# Patient Record
Sex: MALE | Race: WHITE
[De-identification: names, ages, dates, MRNs, and addresses within clinical notes are randomized per-mention and may not be internally consistent; named-entity substitution may affect disease eponyms.]

---

## 2018-04-04 ENCOUNTER — HOSPITAL ENCOUNTER (INPATIENT)
Dept: HOSPITAL 96 - M.ERS | Age: 83
LOS: 6 days | Discharge: NURSING, REHAB OR LONG-TERM CARE | DRG: 871 | End: 2018-04-10
Attending: INTERNAL MEDICINE | Admitting: INTERNAL MEDICINE
Payer: MEDICARE

## 2018-04-04 VITALS — DIASTOLIC BLOOD PRESSURE: 61 MMHG | SYSTOLIC BLOOD PRESSURE: 99 MMHG

## 2018-04-04 VITALS — SYSTOLIC BLOOD PRESSURE: 85 MMHG | DIASTOLIC BLOOD PRESSURE: 45 MMHG

## 2018-04-04 VITALS — BODY MASS INDEX: 24.1 KG/M2 | HEIGHT: 72.01 IN | WEIGHT: 177.91 LBS

## 2018-04-04 VITALS — SYSTOLIC BLOOD PRESSURE: 111 MMHG | DIASTOLIC BLOOD PRESSURE: 54 MMHG

## 2018-04-04 VITALS — SYSTOLIC BLOOD PRESSURE: 111 MMHG | DIASTOLIC BLOOD PRESSURE: 47 MMHG

## 2018-04-04 VITALS — SYSTOLIC BLOOD PRESSURE: 110 MMHG | DIASTOLIC BLOOD PRESSURE: 51 MMHG

## 2018-04-04 VITALS — SYSTOLIC BLOOD PRESSURE: 102 MMHG | DIASTOLIC BLOOD PRESSURE: 54 MMHG

## 2018-04-04 DIAGNOSIS — A41.9: Primary | ICD-10-CM

## 2018-04-04 DIAGNOSIS — I25.10: ICD-10-CM

## 2018-04-04 DIAGNOSIS — E11.621: ICD-10-CM
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
## 2018-04-04 DIAGNOSIS — Z88.0: ICD-10-CM

## 2018-04-04 DIAGNOSIS — Z79.82: ICD-10-CM

## 2018-04-04 DIAGNOSIS — Z95.5: ICD-10-CM

## 2018-04-04 DIAGNOSIS — Z95.0: ICD-10-CM

## 2018-04-04 DIAGNOSIS — Z79.899: ICD-10-CM
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
## 2018-04-04 DIAGNOSIS — I50.33: ICD-10-CM

## 2018-04-04 DIAGNOSIS — N18.9: ICD-10-CM

## 2018-04-04 DIAGNOSIS — E83.42: ICD-10-CM

## 2018-04-04 DIAGNOSIS — E44.0: ICD-10-CM

## 2018-04-04 DIAGNOSIS — N17.9: ICD-10-CM

## 2018-04-04 DIAGNOSIS — Z87.891: ICD-10-CM
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
## 2018-04-04 DIAGNOSIS — S36.62XA: ICD-10-CM

## 2018-04-04 DIAGNOSIS — L97.519: ICD-10-CM
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
## 2018-04-04 DIAGNOSIS — E11.22: ICD-10-CM

## 2018-04-04 DIAGNOSIS — Z86.73: ICD-10-CM

## 2018-04-04 DIAGNOSIS — J44.0: ICD-10-CM

## 2018-04-04 DIAGNOSIS — J18.9: ICD-10-CM

## 2018-04-04 DIAGNOSIS — I13.0: ICD-10-CM

## 2018-04-04 DIAGNOSIS — Z79.01: ICD-10-CM

## 2018-04-04 DIAGNOSIS — E11.51: ICD-10-CM

## 2018-04-04 DIAGNOSIS — I48.91: ICD-10-CM

## 2018-04-04 DIAGNOSIS — E78.5: ICD-10-CM

## 2018-04-04 DIAGNOSIS — L03.90: ICD-10-CM

## 2018-04-04 LAB
ABSOLUTE EOSINOPHILS: 0.1 THOU/UL (ref 0–0.7)
ABSOLUTE MONOCYTES: 0.6 THOU/UL (ref 0–1.2)
ALBUMIN SERPL-MCNC: 2.8 G/DL (ref 3.4–5)
ALP SERPL-CCNC: 56 U/L (ref 46–116)
ALT SERPL-CCNC: 23 U/L (ref 30–65)
ANION GAP SERPL CALC-SCNC: 8 MMOL/L (ref 7–16)
AST SERPL-CCNC: 33 U/L (ref 15–37)
BILIRUB SERPL-MCNC: 0.6 MG/DL
BILIRUB UR-MCNC: NEGATIVE MG/DL
BUN SERPL-MCNC: 30 MG/DL (ref 7–18)
CALCIUM SERPL-MCNC: 8.4 MG/DL (ref 8.5–10.1)
CHLORIDE SERPL-SCNC: 105 MMOL/L (ref 98–107)
CO2 SERPL-SCNC: 27 MMOL/L (ref 21–32)
COLOR UR: YELLOW
CREAT SERPL-MCNC: 1.5 MG/DL (ref 0.6–1.3)
EOSINOPHIL NFR BLD: 1 %
GLUCOSE SERPL-MCNC: 143 MG/DL (ref 70–99)
GRANULOCYTES NFR BLD MANUAL: 72 %
HCT VFR BLD CALC: 36.8 % (ref 42–52)
HGB BLD-MCNC: 12.1 GM/DL (ref 14–18)
INR PPP: 3.9
KETONES UR STRIP-MCNC: NEGATIVE MG/DL
LIPASE: 177 U/L (ref 73–393)
LYMPHOCYTES # BLD: 0.8 THOU/UL (ref 0.8–5.3)
LYMPHOCYTES NFR BLD AUTO: 6 %
MCH RBC QN AUTO: 30.1 PG (ref 26–34)
MCHC RBC AUTO-ENTMCNC: 32.9 G/DL (ref 28–37)
MCV RBC: 91.5 FL (ref 80–100)
MONOCYTES NFR BLD: 4 %
MPV: 7.4 FL. (ref 7.2–11.1)
NEUTROPHILS # BLD: 12.3 THOU/UL (ref 1.6–8.1)
NEUTS BAND NFR BLD: 17 %
NT-PRO BRAIN NAT PEPTIDE: 5600 PG/ML (ref ?–300)
NUCLEATED RBCS: 0 /100WBC
PLATELET # BLD EST: ADEQUATE 10*3/UL
PLATELET COUNT*: 164 THOU/UL (ref 150–400)
POTASSIUM SERPL-SCNC: 4.2 MMOL/L (ref 3.5–5.1)
PROT SERPL-MCNC: 6.5 G/DL (ref 6.4–8.2)
PROT UR QL STRIP: NEGATIVE
PROTHROMBIN TIME: 37.5 SECONDS (ref 9.2–11.5)
RBC # BLD AUTO: 4.03 MIL/UL (ref 4.5–6)
RBC # UR STRIP: (no result) /UL
RBC #/AREA URNS HPF: (no result) /HPF (ref 0–2)
RBC MORPH BLD: NORMAL
RDW-CV: 16.4 % (ref 10.5–14.5)
SODIUM SERPL-SCNC: 140 MMOL/L (ref 136–145)
SP GR UR STRIP: 1.02 (ref 1–1.03)
SQUAMOUS: (no result) /LPF (ref 0–3)
TROPONIN-I LEVEL: <0.06 NG/ML (ref ?–0.06)
URINE CLARITY: CLEAR
URINE GLUCOSE-RANDOM: NEGATIVE
URINE LEUKOCYTES-REFLEX: (no result)
URINE NITRITE-REFLEX: NEGATIVE
URINE WBC-REFLEX: (no result) /HPF (ref 0–5)
UROBILINOGEN UR STRIP-ACNC: 0.2 E.U./DL (ref 0.2–1)
WBC # BLD AUTO: 13.8 THOU/UL (ref 4–11)

## 2018-04-04 PROCEDURE — 02HV33Z INSERTION OF INFUSION DEVICE INTO SUPERIOR VENA CAVA, PERCUTANEOUS APPROACH: ICD-10-PCS | Performed by: INTERNAL MEDICINE

## 2018-04-04 NOTE — EKG
[place] [address]
Phone:  [phone]                     ELECTROCARDIOGRAM REPORT      
_______________________________________________________________________________
 
Name:       YAKOV RODRIGUEZ             Room:           [place] [address]    ADM [place]  
.R.#:  [id number]      Account #:      [id number]  
Admission:  18     Attend Phys:    Angelic Buchanan MD 
Discharge:               Date of Birth:  [birth year]  
         Report #: [id number]
    [id number]
_______________________________________________________________________________
THIS REPORT FOR:  //name//                      
 
                         [facility] ED
                                       
Test Date:    2018               Test Time:    01:45:39
Pat Name:     YAKOV RODRIGUEZ         Department:   
Patient ID:   [id number]            Room:         [facility]
Gender:       M                        Technician:   ELMO ROMEO
:          1933               Requested By: Jeri Miguel
Order Number: [id number] MD:   Kofi Chong
                                 Measurements
Intervals                              Axis          
Rate:         60                       P:            0
AL:           78                       QRS:          -79
QRSD:         204                      T:            99
QT:           468                                    
QTc:          468                                    
                           Interpretive Statements
Ventricular-paced rhythm
No further analysis attempted due to paced rhythm
Baseline wander in lead(s) V3
Compared to ECG 2016 12:54:45
rate increased
 
Electronically Signed On 2018 10:14:00 CDT by Kofi Chong
[email or web address]
 
 
 
 
 
 
 
 
 
 
 
 
 
 
 
 
 
  <ELECTRONICALLY SIGNED>
                                           By: Kofi Chong MD, [facility]      
  18     1014
D: 18 0145   _____________________________________
T: 18 0145   Kofi Chong MD, [facility]        /EPI

## 2018-04-05 VITALS — DIASTOLIC BLOOD PRESSURE: 60 MMHG | SYSTOLIC BLOOD PRESSURE: 140 MMHG

## 2018-04-05 VITALS — DIASTOLIC BLOOD PRESSURE: 46 MMHG | SYSTOLIC BLOOD PRESSURE: 105 MMHG

## 2018-04-05 VITALS — SYSTOLIC BLOOD PRESSURE: 134 MMHG | DIASTOLIC BLOOD PRESSURE: 67 MMHG

## 2018-04-05 VITALS — DIASTOLIC BLOOD PRESSURE: 51 MMHG | SYSTOLIC BLOOD PRESSURE: 120 MMHG

## 2018-04-05 VITALS — DIASTOLIC BLOOD PRESSURE: 50 MMHG | SYSTOLIC BLOOD PRESSURE: 111 MMHG

## 2018-04-05 VITALS — DIASTOLIC BLOOD PRESSURE: 50 MMHG | SYSTOLIC BLOOD PRESSURE: 122 MMHG

## 2018-04-05 LAB
ABSOLUTE BASOPHILS: 0 THOU/UL (ref 0–0.2)
ABSOLUTE EOSINOPHILS: 0 THOU/UL (ref 0–0.7)
ABSOLUTE MONOCYTES: 0.5 THOU/UL (ref 0–1.2)
ANION GAP SERPL CALC-SCNC: 8 MMOL/L (ref 7–16)
BASOPHILS NFR BLD AUTO: 0.3 %
BUN SERPL-MCNC: 28 MG/DL (ref 7–18)
CALCIUM SERPL-MCNC: 7.9 MG/DL (ref 8.5–10.1)
CHLORIDE SERPL-SCNC: 110 MMOL/L (ref 98–107)
CO2 SERPL-SCNC: 23 MMOL/L (ref 21–32)
CREAT SERPL-MCNC: 1.6 MG/DL (ref 0.6–1.3)
EOSINOPHIL NFR BLD: 0.2 %
GLUCOSE SERPL-MCNC: 83 MG/DL (ref 70–99)
GRANULOCYTES NFR BLD MANUAL: 85.6 %
HCT VFR BLD CALC: 32.6 % (ref 42–52)
HGB BLD-MCNC: 10.8 GM/DL (ref 14–18)
INR PPP: 5.5
INR PPP: 5.9
LYMPHOCYTES # BLD: 0.4 THOU/UL (ref 0.8–5.3)
LYMPHOCYTES NFR BLD AUTO: 6.2 %
MCH RBC QN AUTO: 30.8 PG (ref 26–34)
MCHC RBC AUTO-ENTMCNC: 33.3 G/DL (ref 28–37)
MCV RBC: 92.8 FL (ref 80–100)
MONOCYTES NFR BLD: 7.7 %
MPV: 7.9 FL. (ref 7.2–11.1)
NEUTROPHILS # BLD: 5.7 THOU/UL (ref 1.6–8.1)
NUCLEATED RBCS: 0 /100WBC
PLATELET COUNT*: 113 THOU/UL (ref 150–400)
POTASSIUM SERPL-SCNC: 4 MMOL/L (ref 3.5–5.1)
PREALB SERPL-MCNC: 11.3 MG/DL (ref 18–35.7)
PROTHROMBIN TIME: 52.2 SECONDS (ref 9.2–11.5)
PROTHROMBIN TIME: 56.1 SECONDS (ref 9.2–11.5)
RBC # BLD AUTO: 3.51 MIL/UL (ref 4.5–6)
RDW-CV: 16.8 % (ref 10.5–14.5)
SODIUM SERPL-SCNC: 141 MMOL/L (ref 136–145)
WBC # BLD AUTO: 6.6 THOU/UL (ref 4–11)

## 2018-04-05 NOTE — CON
[address]  [address]                    CONSULTATION                  
_______________________________________________________________________________
 
Name:       JENNIFERYAKOV F             Room:           [address] [place]  
M.R.#:  [id number]      Account #:      [id number]  
Admission:  04/04/18     Attend Phys:    Angelic Buchanan MD 
Discharge:               Date of Birth:  [birth year]  
         Report #: [id number]
                                                                     [id number]  
_______________________________________________________________________________
THIS REPORT FOR:  //name//                      
 
CC: Angelic Landin
 
DATE OF SERVICE:  04/04/2018
 
 
INFECTIOUS CONSULTATION
 
ATTENDING PHYSICIAN:  Dr. Buchanan.
 
REASON FOR EVALUATION:  Deep infection, right foot.
 
HISTORY OF PRESENT ILLNESS:  Chart reviewed, patient examined.  This is an
85-year-old gentleman with diabetes mellitus type 2 who I have seen in the past.
 Apparently, he was doing fairly well; however, developed an ulceration
involving the right foot plantar aspect over the course of the last 24 hours
prior to his admission, increasing inflammation noted.  He did have one episode
of emesis and progressive weakness.  He had some chills, although has not
evidently had fevers, history of diabetic foot ulcers.  Due to the rapidity of
its progression, he was admitted, placed on ceftriaxone, vancomycin and
levofloxacin.
 
ALLERGIES:  PENICILLINS, ACE INHIBITOR.
 
MEDICATIONS:  Include tamsulosin, vancomycin, metformin, aspirin, fish oil,
furosemide, atorvastatin, ascorbic acid, metoprolol, finasteride, fenofibrate,
pantoprazole.
 
PAST MEDICAL HISTORY:  Includes diabetes mellitus.  This is complicated by
vasculopathy, has previous stroke, known coronary artery disease, has a
pacemaker, diabetic foot ulcers in the past.
 
SOCIAL HISTORY:  Former smoker.  No ethanol.
 
FAMILY HISTORY:  Noncontributory.
 
REVIEW OF SYSTEMS:  As above.  Significant pulmonary-related complaints.
 
PHYSICAL EXAMINATION:
GENERAL:  He is experiencing mild-to-moderate distress, appears mildly
tachypneic.  He is somewhat chronically ill appearing, perhaps mildly
undernourished.
VITAL SIGNS:  Temperature 98.3, T-max was 99.6, pulse 71, respirations 20, blood
pressure 110/51.
 
 
 
[place] [address]                    CONSULTATION                  
_______________________________________________________________________________
 
Name:       YAKOV RODRIGUEZ             Room:           [address] [place]  
[facility]#:  [id number]      Account #:      [id number]  
Admission:  04/04/18     Attend Phys:    Angelic Buchanan MD 
Discharge:               Date of Birth:  [birth year]  
         Report #: [id number]
                                                                     [id number]  
_______________________________________________________________________________
SKIN:  Warm, dry, no rashes.
HEENT:  Otherwise, unremarkable.
LUNGS:  Diminished breath sounds.
HEART:  Regular.  I do not appreciate murmur.
ABDOMEN:  Soft, nontender, nondistended.
EXTREMITIES:  Dressing in place over the right foot.  I have access to
photographs, showed the plantar ulcer with several centimeters with marginal
surface, moderate degree of inflammation.  He ended up with open ulcer.
GENITOURINARY AND RECTAL:  Deferred.
 
LABORATORY DATA:  Plain film of the foot, no acute osseous abnormalities.  Left
basilar atelectasis, rate of 35.  CRP rate of 40.5.  CBC:  White count 13.8, H
and H 12.1 and 36.8, platelets of 164.  Urinalysis, 0-5 white cells, 10-30
bacteria.  Lactic acid 1.9.  Electrolytes:  Sodium 140, potassium 4.2, chloride
105, bicarbonate is 27, BUN and creatinine 30 and 1.5, glucose of 143.  LFTs
unremarkable.  Albumin of 28.  Total protein 6.5.  Estimated GFR 44.
 
ASSESSMENT:  Tracing skin and soft tissue infection involving the plantar aspect
of the right foot in the setting of diabetes mellitus.  I would expect an
superficial deep type progression outside .  There is no evidence of hard tissue
infection.  At this point, we will continue empiric therapy, he has had some
bedside partial debridement.  We will continue to monitor expectantly.  He may
well need additional intervention, mindful of risk of nosocomial related
infectious complications as well.  I encouraged incentive spirometer to optimize
his nutritional status, monitor expectantly.
 
 
 
 
 
 
 
 
 
 
 
 
 
 
 
 
 
 
 
<ELECTRONICALLY SIGNED>
                                        By:  Inder Garcia MD           
04/05/18     1057
D: 04/04/18 1517_______________________________________
T: 04/04/18 1909Jotriston Garcia MD              /nt

## 2018-04-06 VITALS — SYSTOLIC BLOOD PRESSURE: 130 MMHG | DIASTOLIC BLOOD PRESSURE: 61 MMHG

## 2018-04-06 VITALS — SYSTOLIC BLOOD PRESSURE: 133 MMHG | DIASTOLIC BLOOD PRESSURE: 67 MMHG

## 2018-04-06 VITALS — DIASTOLIC BLOOD PRESSURE: 67 MMHG | SYSTOLIC BLOOD PRESSURE: 133 MMHG

## 2018-04-06 VITALS — DIASTOLIC BLOOD PRESSURE: 49 MMHG | SYSTOLIC BLOOD PRESSURE: 106 MMHG

## 2018-04-06 VITALS — SYSTOLIC BLOOD PRESSURE: 115 MMHG | DIASTOLIC BLOOD PRESSURE: 52 MMHG

## 2018-04-06 VITALS — DIASTOLIC BLOOD PRESSURE: 60 MMHG | SYSTOLIC BLOOD PRESSURE: 122 MMHG

## 2018-04-06 VITALS — SYSTOLIC BLOOD PRESSURE: 147 MMHG | DIASTOLIC BLOOD PRESSURE: 74 MMHG

## 2018-04-06 LAB
ABSOLUTE BASOPHILS: 0 THOU/UL (ref 0–0.2)
ABSOLUTE EOSINOPHILS: 0.1 THOU/UL (ref 0–0.7)
ABSOLUTE MONOCYTES: 0.6 THOU/UL (ref 0–1.2)
ALBUMIN SERPL-MCNC: 2.3 G/DL (ref 3.4–5)
ALP SERPL-CCNC: 42 U/L (ref 46–116)
ALT SERPL-CCNC: 23 U/L (ref 30–65)
ANION GAP SERPL CALC-SCNC: 6 MMOL/L (ref 7–16)
AST SERPL-CCNC: 35 U/L (ref 15–37)
BASOPHILS NFR BLD AUTO: 0.5 %
BILIRUB SERPL-MCNC: 0.4 MG/DL
BUN SERPL-MCNC: 30 MG/DL (ref 7–18)
CALCIUM SERPL-MCNC: 8 MG/DL (ref 8.5–10.1)
CHLORIDE SERPL-SCNC: 111 MMOL/L (ref 98–107)
CO2 SERPL-SCNC: 24 MMOL/L (ref 21–32)
CREAT SERPL-MCNC: 1.5 MG/DL (ref 0.6–1.3)
EOSINOPHIL NFR BLD: 0.9 %
GLUCOSE SERPL-MCNC: 86 MG/DL (ref 70–99)
GRANULOCYTES NFR BLD MANUAL: 83.5 %
HCT VFR BLD CALC: 33.3 % (ref 42–52)
HGB BLD-MCNC: 11.1 GM/DL (ref 14–18)
INR PPP: 5.9
LYMPHOCYTES # BLD: 0.5 THOU/UL (ref 0.8–5.3)
LYMPHOCYTES NFR BLD AUTO: 6.5 %
MCH RBC QN AUTO: 31 PG (ref 26–34)
MCHC RBC AUTO-ENTMCNC: 33.2 G/DL (ref 28–37)
MCV RBC: 93.2 FL (ref 80–100)
MONOCYTES NFR BLD: 8.6 %
MPV: 8 FL. (ref 7.2–11.1)
NEUTROPHILS # BLD: 6.3 THOU/UL (ref 1.6–8.1)
NUCLEATED RBCS: 0 /100WBC
PLATELET COUNT*: 123 THOU/UL (ref 150–400)
POTASSIUM SERPL-SCNC: 3.9 MMOL/L (ref 3.5–5.1)
PROT SERPL-MCNC: 5.7 G/DL (ref 6.4–8.2)
PROTHROMBIN TIME: 55.5 SECONDS (ref 9.2–11.5)
RBC # BLD AUTO: 3.57 MIL/UL (ref 4.5–6)
RDW-CV: 16.6 % (ref 10.5–14.5)
SODIUM SERPL-SCNC: 141 MMOL/L (ref 136–145)
WBC # BLD AUTO: 7.5 THOU/UL (ref 4–11)

## 2018-04-07 VITALS — DIASTOLIC BLOOD PRESSURE: 64 MMHG | SYSTOLIC BLOOD PRESSURE: 123 MMHG

## 2018-04-07 VITALS — SYSTOLIC BLOOD PRESSURE: 104 MMHG | DIASTOLIC BLOOD PRESSURE: 62 MMHG

## 2018-04-07 VITALS — DIASTOLIC BLOOD PRESSURE: 58 MMHG | SYSTOLIC BLOOD PRESSURE: 118 MMHG

## 2018-04-07 VITALS — SYSTOLIC BLOOD PRESSURE: 138 MMHG | DIASTOLIC BLOOD PRESSURE: 51 MMHG

## 2018-04-07 VITALS — SYSTOLIC BLOOD PRESSURE: 134 MMHG | DIASTOLIC BLOOD PRESSURE: 54 MMHG

## 2018-04-07 VITALS — SYSTOLIC BLOOD PRESSURE: 135 MMHG | DIASTOLIC BLOOD PRESSURE: 54 MMHG

## 2018-04-07 LAB
ABSOLUTE BASOPHILS: 0 THOU/UL (ref 0–0.2)
ABSOLUTE EOSINOPHILS: 0.3 THOU/UL (ref 0–0.7)
ABSOLUTE MONOCYTES: 0.6 THOU/UL (ref 0–1.2)
ALBUMIN SERPL-MCNC: 2.4 G/DL (ref 3.4–5)
ALP SERPL-CCNC: 44 U/L (ref 46–116)
ALT SERPL-CCNC: 35 U/L (ref 30–65)
ANION GAP SERPL CALC-SCNC: 9 MMOL/L (ref 7–16)
AST SERPL-CCNC: 46 U/L (ref 15–37)
BASOPHILS NFR BLD AUTO: 0.6 %
BILIRUB SERPL-MCNC: 0.7 MG/DL
BUN SERPL-MCNC: 31 MG/DL (ref 7–18)
CALCIUM SERPL-MCNC: 8 MG/DL (ref 8.5–10.1)
CHLORIDE SERPL-SCNC: 109 MMOL/L (ref 98–107)
CO2 SERPL-SCNC: 25 MMOL/L (ref 21–32)
CREAT SERPL-MCNC: 1.5 MG/DL (ref 0.6–1.3)
EOSINOPHIL NFR BLD: 3.9 %
GLUCOSE SERPL-MCNC: 102 MG/DL (ref 70–99)
GRANULOCYTES NFR BLD MANUAL: 76.2 %
HCT VFR BLD CALC: 33 % (ref 42–52)
HGB BLD-MCNC: 10.9 GM/DL (ref 14–18)
INR PPP: 4.1
LYMPHOCYTES # BLD: 0.8 THOU/UL (ref 0.8–5.3)
LYMPHOCYTES NFR BLD AUTO: 10.7 %
MCH RBC QN AUTO: 30.3 PG (ref 26–34)
MCHC RBC AUTO-ENTMCNC: 33 G/DL (ref 28–37)
MCV RBC: 91.6 FL (ref 80–100)
MONOCYTES NFR BLD: 8.6 %
MPV: 8.3 FL. (ref 7.2–11.1)
NEUTROPHILS # BLD: 5.7 THOU/UL (ref 1.6–8.1)
NUCLEATED RBCS: 0 /100WBC
PLATELET COUNT*: 128 THOU/UL (ref 150–400)
POTASSIUM SERPL-SCNC: 3.6 MMOL/L (ref 3.5–5.1)
PROT SERPL-MCNC: 5.3 G/DL (ref 6.4–8.2)
PROTHROMBIN TIME: 38.7 SECONDS (ref 9.2–11.5)
RBC # BLD AUTO: 3.6 MIL/UL (ref 4.5–6)
RDW-CV: 16.7 % (ref 10.5–14.5)
SODIUM SERPL-SCNC: 143 MMOL/L (ref 136–145)
WBC # BLD AUTO: 7.4 THOU/UL (ref 4–11)

## 2018-04-08 VITALS — DIASTOLIC BLOOD PRESSURE: 61 MMHG | SYSTOLIC BLOOD PRESSURE: 128 MMHG

## 2018-04-08 VITALS — DIASTOLIC BLOOD PRESSURE: 52 MMHG | SYSTOLIC BLOOD PRESSURE: 127 MMHG

## 2018-04-08 VITALS — SYSTOLIC BLOOD PRESSURE: 134 MMHG | DIASTOLIC BLOOD PRESSURE: 71 MMHG

## 2018-04-08 VITALS — SYSTOLIC BLOOD PRESSURE: 135 MMHG | DIASTOLIC BLOOD PRESSURE: 63 MMHG

## 2018-04-08 VITALS — DIASTOLIC BLOOD PRESSURE: 60 MMHG | SYSTOLIC BLOOD PRESSURE: 125 MMHG

## 2018-04-08 VITALS — SYSTOLIC BLOOD PRESSURE: 126 MMHG | DIASTOLIC BLOOD PRESSURE: 48 MMHG

## 2018-04-08 LAB
ALBUMIN SERPL-MCNC: 2.6 G/DL (ref 3.4–5)
ALP SERPL-CCNC: 41 U/L (ref 46–116)
ALT SERPL-CCNC: 27 U/L (ref 30–65)
ANION GAP SERPL CALC-SCNC: 5 MMOL/L (ref 7–16)
ANION GAP SERPL CALC-SCNC: 8 MMOL/L (ref 7–16)
AST SERPL-CCNC: 39 U/L (ref 15–37)
BILIRUB SERPL-MCNC: 1.1 MG/DL
BUN SERPL-MCNC: 27 MG/DL (ref 7–18)
BUN SERPL-MCNC: 28 MG/DL (ref 7–18)
CALCIUM SERPL-MCNC: 8 MG/DL (ref 8.5–10.1)
CALCIUM SERPL-MCNC: 8.2 MG/DL (ref 8.5–10.1)
CHLORIDE SERPL-SCNC: 106 MMOL/L (ref 98–107)
CHLORIDE SERPL-SCNC: 107 MMOL/L (ref 98–107)
CO2 SERPL-SCNC: 29 MMOL/L (ref 21–32)
CO2 SERPL-SCNC: 31 MMOL/L (ref 21–32)
CREAT SERPL-MCNC: 1.5 MG/DL (ref 0.6–1.3)
CREAT SERPL-MCNC: 1.5 MG/DL (ref 0.6–1.3)
GLUCOSE SERPL-MCNC: 156 MG/DL (ref 70–99)
GLUCOSE SERPL-MCNC: 172 MG/DL (ref 70–99)
HCT VFR BLD CALC: 33.7 % (ref 42–52)
HGB BLD-MCNC: 11.4 GM/DL (ref 14–18)
INR PPP: 3.6
MAGNESIUM SERPL-MCNC: 1.6 MG/DL (ref 1.8–2.4)
MAGNESIUM SERPL-MCNC: 1.6 MG/DL (ref 1.8–2.4)
MCH RBC QN AUTO: 30.6 PG (ref 26–34)
MCHC RBC AUTO-ENTMCNC: 33.9 G/DL (ref 28–37)
MCV RBC: 90.2 FL (ref 80–100)
MPV: 8 FL. (ref 7.2–11.1)
PLATELET COUNT*: 160 THOU/UL (ref 150–400)
POTASSIUM SERPL-SCNC: 3.4 MMOL/L (ref 3.5–5.1)
POTASSIUM SERPL-SCNC: 3.6 MMOL/L (ref 3.5–5.1)
PROT SERPL-MCNC: 6 G/DL (ref 6.4–8.2)
PROTHROMBIN TIME: 34.1 SECONDS (ref 9.2–11.5)
RBC # BLD AUTO: 3.73 MIL/UL (ref 4.5–6)
RDW-CV: 16.3 % (ref 10.5–14.5)
SODIUM SERPL-SCNC: 143 MMOL/L (ref 136–145)
SODIUM SERPL-SCNC: 143 MMOL/L (ref 136–145)
WBC # BLD AUTO: 8.6 THOU/UL (ref 4–11)

## 2018-04-09 VITALS — DIASTOLIC BLOOD PRESSURE: 71 MMHG | SYSTOLIC BLOOD PRESSURE: 123 MMHG

## 2018-04-09 VITALS — SYSTOLIC BLOOD PRESSURE: 120 MMHG | DIASTOLIC BLOOD PRESSURE: 53 MMHG

## 2018-04-09 VITALS — DIASTOLIC BLOOD PRESSURE: 91 MMHG | SYSTOLIC BLOOD PRESSURE: 138 MMHG

## 2018-04-09 VITALS — DIASTOLIC BLOOD PRESSURE: 51 MMHG | SYSTOLIC BLOOD PRESSURE: 125 MMHG

## 2018-04-09 VITALS — DIASTOLIC BLOOD PRESSURE: 53 MMHG | SYSTOLIC BLOOD PRESSURE: 127 MMHG

## 2018-04-09 VITALS — DIASTOLIC BLOOD PRESSURE: 34 MMHG | SYSTOLIC BLOOD PRESSURE: 102 MMHG

## 2018-04-09 LAB
ANION GAP SERPL CALC-SCNC: 6 MMOL/L (ref 7–16)
BUN SERPL-MCNC: 26 MG/DL (ref 7–18)
CALCIUM SERPL-MCNC: 8.4 MG/DL (ref 8.5–10.1)
CHLORIDE SERPL-SCNC: 108 MMOL/L (ref 98–107)
CO2 SERPL-SCNC: 31 MMOL/L (ref 21–32)
CREAT SERPL-MCNC: 1.3 MG/DL (ref 0.6–1.3)
GLUCOSE SERPL-MCNC: 103 MG/DL (ref 70–99)
HCT VFR BLD CALC: 28.3 % (ref 42–52)
HGB BLD-MCNC: 9.6 GM/DL (ref 14–18)
INR PPP: 3
MAGNESIUM SERPL-MCNC: 1.8 MG/DL (ref 1.8–2.4)
MCH RBC QN AUTO: 30.6 PG (ref 26–34)
MCHC RBC AUTO-ENTMCNC: 33.8 G/DL (ref 28–37)
MCV RBC: 90.4 FL (ref 80–100)
MPV: 7.8 FL. (ref 7.2–11.1)
PLATELET COUNT*: 154 THOU/UL (ref 150–400)
POTASSIUM SERPL-SCNC: 3.5 MMOL/L (ref 3.5–5.1)
PROTHROMBIN TIME: 29 SECONDS (ref 9.2–11.5)
RBC # BLD AUTO: 3.13 MIL/UL (ref 4.5–6)
RDW-CV: 16.5 % (ref 10.5–14.5)
SODIUM SERPL-SCNC: 145 MMOL/L (ref 136–145)
WBC # BLD AUTO: 8.4 THOU/UL (ref 4–11)

## 2018-04-10 VITALS — DIASTOLIC BLOOD PRESSURE: 60 MMHG | SYSTOLIC BLOOD PRESSURE: 124 MMHG

## 2018-04-10 VITALS — DIASTOLIC BLOOD PRESSURE: 47 MMHG | SYSTOLIC BLOOD PRESSURE: 149 MMHG

## 2018-04-10 VITALS — SYSTOLIC BLOOD PRESSURE: 120 MMHG | DIASTOLIC BLOOD PRESSURE: 52 MMHG

## 2018-04-10 VITALS — SYSTOLIC BLOOD PRESSURE: 141 MMHG | DIASTOLIC BLOOD PRESSURE: 61 MMHG

## 2018-04-10 LAB
ABSOLUTE BASOPHILS: 0 THOU/UL (ref 0–0.2)
ABSOLUTE EOSINOPHILS: 0.4 THOU/UL (ref 0–0.7)
ABSOLUTE MONOCYTES: 0.8 THOU/UL (ref 0–1.2)
ALBUMIN SERPL-MCNC: 2.1 G/DL (ref 3.4–5)
ALP SERPL-CCNC: 41 U/L (ref 46–116)
ALT SERPL-CCNC: 22 U/L (ref 30–65)
ANION GAP SERPL CALC-SCNC: 4 MMOL/L (ref 7–16)
AST SERPL-CCNC: 32 U/L (ref 15–37)
BASOPHILS NFR BLD AUTO: 0.5 %
BILIRUB SERPL-MCNC: 1.2 MG/DL
BUN SERPL-MCNC: 27 MG/DL (ref 7–18)
CALCIUM SERPL-MCNC: 8.2 MG/DL (ref 8.5–10.1)
CHLORIDE SERPL-SCNC: 108 MMOL/L (ref 98–107)
CO2 SERPL-SCNC: 32 MMOL/L (ref 21–32)
CREAT SERPL-MCNC: 1.4 MG/DL (ref 0.6–1.3)
EOSINOPHIL NFR BLD: 3.8 %
GLUCOSE SERPL-MCNC: 105 MG/DL (ref 70–99)
GRANULOCYTES NFR BLD MANUAL: 75.6 %
HCT VFR BLD CALC: 29.5 % (ref 42–52)
HGB BLD-MCNC: 9.9 GM/DL (ref 14–18)
INR PPP: 2.5
LYMPHOCYTES # BLD: 1.1 THOU/UL (ref 0.8–5.3)
LYMPHOCYTES NFR BLD AUTO: 11.5 %
MCH RBC QN AUTO: 30.4 PG (ref 26–34)
MCHC RBC AUTO-ENTMCNC: 33.7 G/DL (ref 28–37)
MCV RBC: 90.3 FL (ref 80–100)
MONOCYTES NFR BLD: 8.6 %
MPV: 7.9 FL. (ref 7.2–11.1)
NEUTROPHILS # BLD: 7.4 THOU/UL (ref 1.6–8.1)
NUCLEATED RBCS: 0 /100WBC
PLATELET COUNT*: 188 THOU/UL (ref 150–400)
POTASSIUM SERPL-SCNC: 3.7 MMOL/L (ref 3.5–5.1)
PROT SERPL-MCNC: 5.6 G/DL (ref 6.4–8.2)
PROTHROMBIN TIME: 23.6 SECONDS (ref 9.2–11.5)
RBC # BLD AUTO: 3.27 MIL/UL (ref 4.5–6)
RDW-CV: 16.5 % (ref 10.5–14.5)
SODIUM SERPL-SCNC: 144 MMOL/L (ref 136–145)
WBC # BLD AUTO: 9.8 THOU/UL (ref 4–11)

## 2018-04-11 ENCOUNTER — HOSPITAL ENCOUNTER (OUTPATIENT)
Dept: HOSPITAL 96 - M.WC | Age: 83
End: 2018-04-11
Attending: SURGERY
Payer: MEDICARE

## 2018-04-11 DIAGNOSIS — L97.511: ICD-10-CM
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
## 2018-04-11 DIAGNOSIS — I11.0: ICD-10-CM
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
## 2018-04-11 DIAGNOSIS — S81.811A: Primary | ICD-10-CM

## 2018-04-11 DIAGNOSIS — S81.812A: ICD-10-CM

## 2018-04-11 DIAGNOSIS — E78.5: ICD-10-CM
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
## 2018-04-11 DIAGNOSIS — I25.10: ICD-10-CM

## 2018-04-11 DIAGNOSIS — E50.9: ICD-10-CM

## 2018-04-11 DIAGNOSIS — E11.621: ICD-10-CM
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
## 2018-04-11 DIAGNOSIS — Z89.422: ICD-10-CM

## 2018-04-11 DIAGNOSIS — Z95.0: ICD-10-CM

## 2018-04-11 DIAGNOSIS — E11.51: ICD-10-CM

## 2018-04-11 DIAGNOSIS — Z89.421: ICD-10-CM

## 2018-04-11 NOTE — CON
[address]  [address]                    CONSULTATION                  
_______________________________________________________________________________
 
Name:       JENNIFERYAKOV F             Room:           M.221[address]P    DIS [place]  
M.R.#:  [id number]      Account #:      [id number]  
Admission:  04/04/18     Attend Phys:    Angelic Buchanan MD 
Discharge:  04/10/18     Date of Birth:  [birth year]  
         Report #: [id number]
                                                                     [id number]  
_______________________________________________________________________________
THIS REPORT FOR:  //name//                      
 
CC: Angelic Landin
 
DICTATED BY: Mecca SULLIVAN
 
DATE OF SERVICE:  04/04/2018
 
 
REASON FOR CONSULTATION:  Peripheral artery disease, right diabetic foot wound.
 
HISTORY OF PRESENT ILLNESS:  The patient is a very pleasant 85-year-old male,
who is well known to our practice, with history of peripheral artery disease,
diabetes mellitus, diabetic neuropathy with history of diabetic foot wounds.  He
has been treated in the [facility] by Dr. South
as well as Dr. Roman.  He underwent incision and drainage of the right foot in
08/2014, ultimately ended up requiring a right great toe and first ray
amputation on 08/29/2014 with Dr. Santosh Roman.  He also underwent a right
lower extremity arteriogram in 08/2014 with Dr. Santosh Roman, with right
superficial femoral artery angioplasty.  He developed a new ulcer on the right
lower extremity; therefore, underwent a repeat right lower extremity arteriogram
on 08/16/2016 with atherectomy, angioplasty and stenting at the right
superficial femoral artery with angioplasty and stenting of the popliteal
artery.  He also has a history of a left femoral to tibial artery bypass by Dr. Roman.  Followup arterial studies obtained in our outpatient office on
10/30/2017 demonstrated an QIAN of 0.59 on the right, 0.61 on the left with a
patent femoral to popliteal artery bypass on the left.  Annual followup was
recommended at that time.  He reports, on Sunday evening he developed erythema
of the right foot.  His wife reports she puts lotion on his feet daily and
examines his feet.  She reports they did a lot of walking and while shopping
yesterday, which caused worsening of the wound on the plantar aspect of his
right foot.  He developed chills as well as nausea overnight; therefore,
presented to the Emergency Room for further evaluation and treatment this
morning.  He continues to complain of chills, no current nausea.  He denies any
pain in his feet.  He denies any claudication type symptoms with ambulation.  We
have been asked to evaluate the patient for ongoing management of his peripheral
artery disease as well as evaluation of his new right diabetic foot wounds.
 
PAST MEDICAL HISTORY:
1.  Peripheral artery disease.
2.  Diabetes mellitus.
3.  Diabetic foot wounds.
4.  Atrial fibrillation.
5.  Chronic lower extremity edema, for which he wears compression stockings.
6.  Congestive heart failure.
 
 
 
[place] [address]                    CONSULTATION                  
_______________________________________________________________________________
 
Name:       JENNIFER,YAKOV F             Room:           M.221[address]P    DIS [place]  
M.R.#:  [id number]      Account #:      [id number]  
Admission:  04/04/18     Attend Phys:    Angelic Buchanan MD 
Discharge:  04/10/18     Date of Birth:  [birth year]  
         Report #: [id number]
                                                                     [id number]  
_______________________________________________________________________________
7.  History of sepsis.
8.  Coronary artery disease.
9.  Hypertension.
10.  Hyperlipidemia.
 
PAST SURGICAL HISTORY:
1.  Kidney stone removal.
2.  Coronary artery stent placement.
3.  Right foot debridement with ultimate right great toe amputation.
4.  Right lower extremity arteriogram with SFA angioplasty in 08/2014.
5.  Right lower extremity arteriogram with right SFA atherectomy, angioplasty,
and stenting; popliteal angioplasty and stenting in 08/2016.
6.  Left femoral to tibial artery bypass.
7.  Pacemaker placement.
 
SOCIAL HISTORY:  He is former smoker, he is , lives with his spouse.  He
denies any alcohol or illicit drug use.
 
FAMILY HISTORY:  Noncontributory due to his advanced age.
 
ALLERGIES:
1.  ACE INHIBITORS.
2.  PENICILLIN.
 
HOME MEDICATIONS:
1.  Mag ox 400 mg daily.
2.  Nitrostat 0.4 mg sublingually as needed for chest pain.
3.  Glucophage 500 mg twice daily.
4.  Furosemide 20 mg daily.
5.  Coumadin as directed by INR.
6.  Fish oil 1000 mg daily.
7.  Tamsulosin 0.4 mg at bedtime.
8.  Fenofibrate 160 mg daily.
9.  Finasteride 5 mg daily.
10.  Lopressor 25 mg twice daily.
11.  Fish oil 1000 mg daily.
12.  Aspirin 81 mg daily.
13.  Iron 65 mg daily.
14.  Vitamin C 500 mg daily.
15.  Lipitor 40 mg daily.
 
REVIEW OF SYSTEMS:  A 12-point review of systems has been reviewed and is
negative except for the above-mentioned in the history of present illness.
 
PHYSICAL EXAMINATION:
VITAL SIGNS:  Temperature 36.9, heart rate 71, respiratory rate 16, blood
 
 
 
[facility] 
[address] [place] [address]                    CONSULTATION                  
_______________________________________________________________________________
 
Name:       YAKOV RODRIGUEZ             [facility] [place]  
[facility]#:  [id number]      Account #:      [id number]  
Admission:  04/04/18     Attend Phys:    Angelic Buchanan MD 
Discharge:  04/10/18     Date of Birth:  [birth year]  
         Report #: [id number]
                                                                     [id number]  
_______________________________________________________________________________
pressure 110/51, oxygen saturation is 100% on 2 liters per nasal cannula.
GENERAL:  He is alert and oriented, in no acute distress.
HEENT:  Head is normocephalic, atraumatic.
NECK:  Supple, without jugular venous distention or carotid bruit.
HEART:  He has distant heart tones, regular rate and rhythm.
CHEST:  Lungs are diminished.  No wheezing or rhonchi noted, symmetrical
expansion, no distress.
ABDOMEN:  Soft, nontender, hypoactive bowel sounds.
EXTREMITIES:  Palpable bilateral radial and femoral pulses with dopplerable
bilateral posterior tibialis and dorsalis pedis pulses.  He has a well-healed
right great toe amputation.  There is a wound on the plantar aspect of the right
foot that is fairly superficial, scant drainage with a slight foul odor.  There
is a very superficial wound on the right medial lower leg that is healing well. 
He has chronic hemosiderin staining to bilateral lower extremities.
NEUROLOGIC:  He has insensate feet.  Otherwise, no focal neurologic deficits.
 
LABORATORY DATA:  Hemoglobin 12.1, hematocrit 36.8, white blood cell count 13.8,
platelets 164.  Sodium 140, potassium 4.2, chloride 105, CO2 27, BUN 30,
creatinine 1.5, glucose 100.
 
ASSESSMENT AND PLAN:
1.  Peripheral artery disease with a history of right lower extremity
revascularization as well as a left femoral to popliteal artery bypass.  We will
obtain arterial ultrasounds as well as ABIs to evaluate his arterial perfusion
and wound healing potential.
2.  Right diabetic foot ulcer.  Local wound care has been ordered.  He needs to
offload, heel weightbearing only on the right with ambulation.  X-rays were
reviewed, no apparent osteomyelitis, no tunneling noted.
3.  Diabetes mellitus.
4.  Coronary artery disease.
 
I thank you for the opportunity to participate in the care of the patient. 
Please feel free to contact our office with any questions or concerns.
 
 
 
 
 
 
 
 
 
 
 
<ELECTRONICALLY SIGNED>
                                        By:  Santosh Roman DO        
04/11/18     0947
D: 04/04/18 1150_______________________________________
T: 04/04/18 1356Santosh Roman DO           /nt

## 2018-04-18 ENCOUNTER — HOSPITAL ENCOUNTER (OUTPATIENT)
Dept: HOSPITAL 96 - M.WC | Age: 83
End: 2018-04-18
Attending: SURGERY
Payer: MEDICARE

## 2018-04-18 DIAGNOSIS — I50.9: ICD-10-CM

## 2018-04-18 DIAGNOSIS — E78.5: ICD-10-CM

## 2018-04-18 DIAGNOSIS — Z89.421: ICD-10-CM

## 2018-04-18 DIAGNOSIS — I11.0: ICD-10-CM
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
## 2018-04-18 DIAGNOSIS — I25.10: ICD-10-CM

## 2018-04-18 DIAGNOSIS — Z95.0: ICD-10-CM

## 2018-04-18 DIAGNOSIS — S81.811D: ICD-10-CM

## 2018-04-18 DIAGNOSIS — E11.621: Primary | ICD-10-CM

## 2018-04-18 DIAGNOSIS — S81.812D: ICD-10-CM

## 2018-04-18 DIAGNOSIS — L97.511: ICD-10-CM

## 2018-04-18 DIAGNOSIS — E11.51: ICD-10-CM

## 2018-04-18 DIAGNOSIS — Z89.411: ICD-10-CM

## 2018-04-25 ENCOUNTER — HOSPITAL ENCOUNTER (OUTPATIENT)
Dept: HOSPITAL 96 - M.INT | Age: 83
Setting detail: OBSERVATION
LOS: 1 days | Discharge: HOME HEALTH | End: 2018-04-26
Attending: INTERNAL MEDICINE | Admitting: INTERNAL MEDICINE
Payer: MEDICARE

## 2018-04-25 VITALS — SYSTOLIC BLOOD PRESSURE: 123 MMHG | DIASTOLIC BLOOD PRESSURE: 61 MMHG

## 2018-04-25 VITALS — SYSTOLIC BLOOD PRESSURE: 123 MMHG | DIASTOLIC BLOOD PRESSURE: 68 MMHG

## 2018-04-25 VITALS — WEIGHT: 164 LBS | BODY MASS INDEX: 22.21 KG/M2 | HEIGHT: 72 IN

## 2018-04-25 VITALS — DIASTOLIC BLOOD PRESSURE: 69 MMHG | SYSTOLIC BLOOD PRESSURE: 134 MMHG

## 2018-04-25 VITALS — DIASTOLIC BLOOD PRESSURE: 64 MMHG | SYSTOLIC BLOOD PRESSURE: 126 MMHG

## 2018-04-25 VITALS — DIASTOLIC BLOOD PRESSURE: 65 MMHG | SYSTOLIC BLOOD PRESSURE: 112 MMHG

## 2018-04-25 VITALS — SYSTOLIC BLOOD PRESSURE: 124 MMHG | DIASTOLIC BLOOD PRESSURE: 55 MMHG

## 2018-04-25 VITALS — SYSTOLIC BLOOD PRESSURE: 119 MMHG | DIASTOLIC BLOOD PRESSURE: 61 MMHG

## 2018-04-25 VITALS — DIASTOLIC BLOOD PRESSURE: 64 MMHG | SYSTOLIC BLOOD PRESSURE: 125 MMHG

## 2018-04-25 VITALS — DIASTOLIC BLOOD PRESSURE: 52 MMHG | SYSTOLIC BLOOD PRESSURE: 114 MMHG

## 2018-04-25 VITALS — DIASTOLIC BLOOD PRESSURE: 48 MMHG | SYSTOLIC BLOOD PRESSURE: 128 MMHG

## 2018-04-25 VITALS — DIASTOLIC BLOOD PRESSURE: 62 MMHG | SYSTOLIC BLOOD PRESSURE: 131 MMHG

## 2018-04-25 VITALS — SYSTOLIC BLOOD PRESSURE: 139 MMHG | DIASTOLIC BLOOD PRESSURE: 63 MMHG

## 2018-04-25 DIAGNOSIS — E11.22: ICD-10-CM

## 2018-04-25 DIAGNOSIS — I25.2: ICD-10-CM

## 2018-04-25 DIAGNOSIS — I25.10: ICD-10-CM

## 2018-04-25 DIAGNOSIS — I48.91: ICD-10-CM

## 2018-04-25 DIAGNOSIS — N18.3: ICD-10-CM

## 2018-04-25 DIAGNOSIS — Z87.891: ICD-10-CM

## 2018-04-25 DIAGNOSIS — I50.9: ICD-10-CM

## 2018-04-25 DIAGNOSIS — Z89.411: ICD-10-CM

## 2018-04-25 DIAGNOSIS — I70.261: Primary | ICD-10-CM

## 2018-04-25 DIAGNOSIS — E11.621: ICD-10-CM

## 2018-04-25 DIAGNOSIS — D68.59: ICD-10-CM

## 2018-04-25 DIAGNOSIS — Z89.421: ICD-10-CM

## 2018-04-25 DIAGNOSIS — I73.9: ICD-10-CM

## 2018-04-25 DIAGNOSIS — M86.9: ICD-10-CM

## 2018-04-25 DIAGNOSIS — Z95.810: ICD-10-CM

## 2018-04-25 DIAGNOSIS — Z95.5: ICD-10-CM

## 2018-04-25 LAB
ABSOLUTE BASOPHILS: 0 THOU/UL (ref 0–0.2)
ABSOLUTE EOSINOPHILS: 0.2 THOU/UL (ref 0–0.7)
ABSOLUTE MONOCYTES: 0.9 THOU/UL (ref 0–1.2)
ANION GAP SERPL CALC-SCNC: 6 MMOL/L (ref 7–16)
APTT BLD: 39.2 SECONDS (ref 25–31.3)
BASOPHILS NFR BLD AUTO: 0.5 %
BUN SERPL-MCNC: 40 MG/DL (ref 7–18)
CALCIUM SERPL-MCNC: 9.2 MG/DL (ref 8.5–10.1)
CHLORIDE SERPL-SCNC: 103 MMOL/L (ref 98–107)
CO2 SERPL-SCNC: 31 MMOL/L (ref 21–32)
CREAT SERPL-MCNC: 1.9 MG/DL (ref 0.6–1.3)
EOSINOPHIL NFR BLD: 1.7 %
GLUCOSE SERPL-MCNC: 82 MG/DL (ref 70–99)
GRANULOCYTES NFR BLD MANUAL: 74.6 %
HCT VFR BLD CALC: 38.5 % (ref 42–52)
HGB BLD-MCNC: 13.1 GM/DL (ref 14–18)
INR PPP: 2.6
LYMPHOCYTES # BLD: 1.2 THOU/UL (ref 0.8–5.3)
LYMPHOCYTES NFR BLD AUTO: 13.5 %
MCH RBC QN AUTO: 30.8 PG (ref 26–34)
MCHC RBC AUTO-ENTMCNC: 34.2 G/DL (ref 28–37)
MCV RBC: 90 FL (ref 80–100)
MONOCYTES NFR BLD: 9.7 %
MPV: 7 FL. (ref 7.2–11.1)
NEUTROPHILS # BLD: 6.8 THOU/UL (ref 1.6–8.1)
NUCLEATED RBCS: 0 /100WBC
PLATELET COUNT*: 211 THOU/UL (ref 150–400)
POTASSIUM SERPL-SCNC: 3.8 MMOL/L (ref 3.5–5.1)
PROTHROMBIN TIME: 25.4 SECONDS (ref 9.2–11.5)
RBC # BLD AUTO: 4.27 MIL/UL (ref 4.5–6)
RDW-CV: 16.3 % (ref 10.5–14.5)
SODIUM SERPL-SCNC: 140 MMOL/L (ref 136–145)
WBC # BLD AUTO: 9.1 THOU/UL (ref 4–11)

## 2018-04-26 VITALS — SYSTOLIC BLOOD PRESSURE: 121 MMHG | DIASTOLIC BLOOD PRESSURE: 73 MMHG

## 2018-04-26 VITALS — DIASTOLIC BLOOD PRESSURE: 72 MMHG | SYSTOLIC BLOOD PRESSURE: 131 MMHG

## 2018-04-26 VITALS — SYSTOLIC BLOOD PRESSURE: 120 MMHG | DIASTOLIC BLOOD PRESSURE: 62 MMHG

## 2018-04-26 VITALS — DIASTOLIC BLOOD PRESSURE: 63 MMHG | SYSTOLIC BLOOD PRESSURE: 139 MMHG

## 2018-04-26 VITALS — DIASTOLIC BLOOD PRESSURE: 61 MMHG | SYSTOLIC BLOOD PRESSURE: 106 MMHG

## 2018-04-26 VITALS — DIASTOLIC BLOOD PRESSURE: 67 MMHG | SYSTOLIC BLOOD PRESSURE: 129 MMHG

## 2018-04-26 LAB
ANION GAP SERPL CALC-SCNC: 6 MMOL/L (ref 7–16)
BUN SERPL-MCNC: 35 MG/DL (ref 7–18)
CALCIUM SERPL-MCNC: 8.1 MG/DL (ref 8.5–10.1)
CHLORIDE SERPL-SCNC: 101 MMOL/L (ref 98–107)
CO2 SERPL-SCNC: 30 MMOL/L (ref 21–32)
CREAT SERPL-MCNC: 1.7 MG/DL (ref 0.6–1.3)
GLUCOSE SERPL-MCNC: 110 MG/DL (ref 70–99)
HCT VFR BLD CALC: 31.9 % (ref 42–52)
HGB BLD-MCNC: 10.7 GM/DL (ref 14–18)
MCH RBC QN AUTO: 30.6 PG (ref 26–34)
MCHC RBC AUTO-ENTMCNC: 33.5 G/DL (ref 28–37)
MCV RBC: 91.3 FL (ref 80–100)
MPV: 7.2 FL. (ref 7.2–11.1)
PLATELET COUNT*: 158 THOU/UL (ref 150–400)
POTASSIUM SERPL-SCNC: 3.5 MMOL/L (ref 3.5–5.1)
RBC # BLD AUTO: 3.49 MIL/UL (ref 4.5–6)
RDW-CV: 16.4 % (ref 10.5–14.5)
SODIUM SERPL-SCNC: 137 MMOL/L (ref 136–145)
WBC # BLD AUTO: 9.5 THOU/UL (ref 4–11)

## 2018-05-02 ENCOUNTER — HOSPITAL ENCOUNTER (OUTPATIENT)
Dept: HOSPITAL 96 - M.WC | Age: 83
End: 2018-05-02
Attending: SURGERY
Payer: MEDICARE

## 2018-05-02 DIAGNOSIS — Z89.422: ICD-10-CM

## 2018-05-02 DIAGNOSIS — L97.511: ICD-10-CM

## 2018-05-02 DIAGNOSIS — E78.5: ICD-10-CM

## 2018-05-02 DIAGNOSIS — I50.9: ICD-10-CM

## 2018-05-02 DIAGNOSIS — E11.51: ICD-10-CM

## 2018-05-02 DIAGNOSIS — E11.621: Primary | ICD-10-CM

## 2018-05-02 DIAGNOSIS — I25.10: ICD-10-CM

## 2018-05-02 DIAGNOSIS — Z89.421: ICD-10-CM

## 2018-05-02 DIAGNOSIS — I11.0: ICD-10-CM

## 2018-05-02 DIAGNOSIS — Z95.0: ICD-10-CM

## 2018-05-10 ENCOUNTER — HOSPITAL ENCOUNTER (INPATIENT)
Dept: HOSPITAL 96 - M.ICU | Age: 83
LOS: 4 days | Discharge: HOME HEALTH | DRG: 253 | End: 2018-05-14
Attending: INTERNAL MEDICINE | Admitting: INTERNAL MEDICINE
Payer: MEDICARE

## 2018-05-10 VITALS — DIASTOLIC BLOOD PRESSURE: 38 MMHG | SYSTOLIC BLOOD PRESSURE: 96 MMHG

## 2018-05-10 VITALS — BODY MASS INDEX: 22.62 KG/M2 | HEIGHT: 72.01 IN | WEIGHT: 167 LBS

## 2018-05-10 VITALS — DIASTOLIC BLOOD PRESSURE: 41 MMHG | SYSTOLIC BLOOD PRESSURE: 100 MMHG

## 2018-05-10 VITALS — SYSTOLIC BLOOD PRESSURE: 141 MMHG | DIASTOLIC BLOOD PRESSURE: 76 MMHG

## 2018-05-10 VITALS — DIASTOLIC BLOOD PRESSURE: 46 MMHG | SYSTOLIC BLOOD PRESSURE: 111 MMHG

## 2018-05-10 VITALS — DIASTOLIC BLOOD PRESSURE: 44 MMHG | SYSTOLIC BLOOD PRESSURE: 99 MMHG

## 2018-05-10 VITALS — SYSTOLIC BLOOD PRESSURE: 102 MMHG | DIASTOLIC BLOOD PRESSURE: 48 MMHG

## 2018-05-10 VITALS — DIASTOLIC BLOOD PRESSURE: 48 MMHG | SYSTOLIC BLOOD PRESSURE: 102 MMHG

## 2018-05-10 DIAGNOSIS — Z95.2: ICD-10-CM

## 2018-05-10 DIAGNOSIS — I48.91: ICD-10-CM

## 2018-05-10 DIAGNOSIS — Z89.421: ICD-10-CM

## 2018-05-10 DIAGNOSIS — E78.5: ICD-10-CM

## 2018-05-10 DIAGNOSIS — Z95.0: ICD-10-CM

## 2018-05-10 DIAGNOSIS — Z88.8: ICD-10-CM

## 2018-05-10 DIAGNOSIS — E11.621: ICD-10-CM

## 2018-05-10 DIAGNOSIS — D68.59: ICD-10-CM

## 2018-05-10 DIAGNOSIS — Z89.422: ICD-10-CM

## 2018-05-10 DIAGNOSIS — Z95.5: ICD-10-CM

## 2018-05-10 DIAGNOSIS — L97.518: ICD-10-CM

## 2018-05-10 DIAGNOSIS — Z89.411: ICD-10-CM

## 2018-05-10 DIAGNOSIS — E11.22: ICD-10-CM

## 2018-05-10 DIAGNOSIS — Z79.899: ICD-10-CM

## 2018-05-10 DIAGNOSIS — E11.51: Primary | ICD-10-CM

## 2018-05-10 DIAGNOSIS — Z87.891: ICD-10-CM

## 2018-05-10 DIAGNOSIS — I25.2: ICD-10-CM

## 2018-05-10 DIAGNOSIS — Z79.01: ICD-10-CM

## 2018-05-10 DIAGNOSIS — I70.203: ICD-10-CM

## 2018-05-10 DIAGNOSIS — I50.9: ICD-10-CM

## 2018-05-10 DIAGNOSIS — Z79.82: ICD-10-CM

## 2018-05-10 DIAGNOSIS — N18.3: ICD-10-CM

## 2018-05-10 DIAGNOSIS — Z88.0: ICD-10-CM

## 2018-05-10 DIAGNOSIS — I25.10: ICD-10-CM

## 2018-05-10 DIAGNOSIS — Z91.040: ICD-10-CM

## 2018-05-10 LAB
ANION GAP SERPL CALC-SCNC: 8 MMOL/L (ref 7–16)
APTT BLD: 36.5 SECONDS (ref 25–31.3)
BUN SERPL-MCNC: 26 MG/DL (ref 7–18)
CALCIUM SERPL-MCNC: 7.9 MG/DL (ref 8.5–10.1)
CHLORIDE SERPL-SCNC: 105 MMOL/L (ref 98–107)
CO2 SERPL-SCNC: 25 MMOL/L (ref 21–32)
CREAT SERPL-MCNC: 1.2 MG/DL (ref 0.6–1.3)
GLUCOSE SERPL-MCNC: 116 MG/DL (ref 70–99)
HCT VFR BLD CALC: 25.4 % (ref 42–52)
HGB BLD-MCNC: 8.6 GM/DL (ref 14–18)
INR PPP: 1.8
POTASSIUM SERPL-SCNC: 3.9 MMOL/L (ref 3.5–5.1)
PROTHROMBIN TIME: 17.4 SECONDS (ref 9.2–11.5)
SODIUM SERPL-SCNC: 138 MMOL/L (ref 136–145)

## 2018-05-10 PROCEDURE — 041K09M BYPASS RIGHT FEMORAL ARTERY TO PERONEAL ARTERY WITH AUTOLOGOUS VENOUS TISSUE, OPEN APPROACH: ICD-10-PCS | Performed by: SURGERY

## 2018-05-10 NOTE — NUR
PATIENT RESTED MOST OF THE AFTERNOON FOLLOWING FEM-POP PROCEDURE. PATIENT C/O
RIGHT HIP PAIN, PAIN MEDICAITON PROVIDED PER MAR. PATIENT FORGETFUL AT TIMES,
NOT BASELINE. PATIENT C/O BURNING SENSATION IN RLE. DR. LOUIS TO BEDSIDE,
REPROFUSION PAIN NOTED TO BE THE PROBLEM. LOW URINE OUTPUT NOTED. BEDSIDE
REPORT TO BE GIVEN TO ONCOMING SHIFT

## 2018-05-10 NOTE — OP
[facility] 
[address] [place]  [address]                    OPERATIVE REPORT              
_______________________________________________________________________________
 
Name:       YAKOV RODRIGUEZ             Room:           [address] [place]  
..#:  [id number]      Account #:      [id number]  
Admission:  05/10/18     Attend Phys:    KAMI Littlejohn
Discharge:               Date of Birth:  [birth year]  
         Report #: [id number]
                                                                     [id number]  
_______________________________________________________________________________
THIS REPORT FOR:  //name//                      
 
CC: Shaggy Silva
 
DATE OF SERVICE:  05/10/2018
 
 
PREOPERATIVE DIAGNOSIS:  Critical right lower extremity ischemia.
 
POSTOPERATIVE DIAGNOSIS:  Critical right lower extremity ischemia.
 
OPERATION:  Right femoral to tibioperoneal trunk bypass with cadaveric CryoVein.
 
SURGEON:  Shaggy Sandra DO.
 
ASSISTANT:  None.
 
ANESTHESIA:  General.
 
ESTIMATED BLOOD LOSS:  400 mL.
 
FLUIDS:  1500 crystalloid.
 
URINE OUTPUT:  150 mL.
 
SPECIMENS:  None.
 
IMPLANTS:  A cadaveric vein in the right leg.
 
COMPLICATIONS:  None.
 
FINDINGS:  Right common femoral artery was soft _____ good inflow vessel. 
Dissection of the tibioperoneal trunk and posterior tibial, peroneal arteries
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  However, upon arteriotomy and
exploration of the vessels intraluminally, there was good backbleeding from the
PT and peroneal arteries.  After completion of the bypass, he had good
multiphasic PT Doppler signal at the ankle.
 
CLINICAL HISTORY:  The patient is an 85-year-old man with known severe
peripheral vascular disease, been followed in the wound care for nonhealing
right lower extremity wounds.  He was brought in today for bypass procedure.  He
has known autogenous conduit, previously had a left lower extremity CryoVein
bypass as well.
 
 
 
 
[place] [address]                    OPERATIVE REPORT              
_______________________________________________________________________________
 
Name:       YAKOV RODRIGUEZ             Room:           [address] [place]  
.R.#:  [id number]      Account #:      [id number]  
Admission:  05/10/18     Attend Phys:    KAMI Littlejohn
Discharge:               Date of Birth:  [birth year]  
         Report #: [id number]
                                                                     [id number]  
_______________________________________________________________________________
DESCRIPTION OF PROCEDURE:  After informed consent was obtained, the patient was
taken to the operating room and placed on the OR bed in supine position.  He was
administered general anesthesia by the anesthesia team.  The right lower
extremity was prepped and draped in usual sterile fashion.  Full timeout was
performed identifying correct patient and procedure.  Next, a standard
longitudinal medial below knee incision was made.  Dissection was carried down
through skin and subcutaneous tissues, both sharp and electrocautery.  Fascia
was incised.  The gastrocnemius muscle was reflected posteriorly.  The soleus
was divided from the tibia.  I was able to isolate out the posterior tibial
artery.  Multiple venous branches were ligated between silk ties and divided.  I
then further dissected out of the posterior tibial artery and isolated out  the
tibioperoneal trunk and the peroneal artery and controlled these with Silastic
vessel loops in Valadez fashion.  He recently had an endovascular attempted
revascularization which was unsuccessful both antegrade and retrograde
direction.  There was obvious thrombosed dissection plane.  At this point, I
heparinized the patient with 5000 units of heparin.  I made an arteriotomy in
the tibioperoneal trunk extending on the posterior tibial artery.  There was
good backbleeding from the peroneal and posterior tibial artery.  At this point,
I occluded the arteries, turned my attention to the right groin, made a
transverse incision in the right groin.  Dissection was carried down again
through skin and subcutaneous tissues, both sharp and electrocautery.  The
femoral sheath was entered.  The femoral artery was circumferentially mobilized
proximally and distally and controlled with Silastic vessel loops in Valadez
fashion.  Again, administered at this point an additional 2000 units of heparin.
 The cadaveric vein was then prepped in the standard fashion as per the
's instructions.  The vein was trimmed and a vein mac was created. 
I then occluded the femoral artery, made an arteriotomy extended with Valadez
scissors.  I performed end-to-side anastomosis with running 5-0 Prolene suture. 
Prior to completion of the suture line, the artery was flushed and the vein was
flushed as well with heparinized saline.  Suture line was completed.  The vein
was then distended and marked to prevent axial rotation.  Counter incision was
made in the medial mid thigh.  I then tunneled the vein subfascially to the
counter incision.  I then tunneled it from the counter incision again down to
the below knee incision.  At this point, I then trimmed the vein to length and
tailored distal vein mac, performed end-to-side anastomosis with the
tibioperoneal trunk and posterior tibial artery with a running 6-0 Prolene
suture.  Prior to completion of the suture line, the vein was again flushed and
the artery was allowed to backbleed.  Anastomosis was flushed with heparinized
saline.  Suture line was completed and flow was restored down the leg.  Doppler
interrogation confirmed multiphasic signals in the posterior tibial artery and
peroneal artery as well.  He had good multiphasic posterior tibial artery
Doppler signal at the ankle.  At this point, I then administered 50 mg protamine
to partially reverse the heparin.  Once hemostasis was ensured,  the wound was
irrigated with antibiotic solution.  They were closed in multiple layers with
2-0 and 3-0 Vicryl suture and 4-0 Monocryl, skin and Dermabond were applied. 
 
 
 
[address]  [address]                    OPERATIVE REPORT              
_______________________________________________________________________________
 
Name:       YAKOV RODRIGUEZ             Room:           [address] [place]  
M.R.#:  [id number]      Account #:      [id number]  
Admission:  05/10/18     Attend Phys:    KAMI Littlejohn
Discharge:               Date of Birth:  [birth year]  
         Report #: [id number]
                                                                     [id number]  
_______________________________________________________________________________
All sponge, sharp and instrument counts reported correct x 2.  He tolerated the
procedure well and was transferred to recovery in stable condition.
 
 
 
 
 
 
 
 
 
 
 
 
 
 
 
 
 
 
 
 
 
 
 
 
 
 
 
 
 
 
 
 
 
 
 
 
 
 
 
 
 
 
<ELECTRONICALLY SIGNED>
                                        By:  Shaggy Sandra DO          
05/10/18     1517
D: 05/10/18 1228_______________________________________
T: 05/10/18 1341Anahum Sandra DO             /nt

## 2018-05-11 VITALS — SYSTOLIC BLOOD PRESSURE: 110 MMHG | DIASTOLIC BLOOD PRESSURE: 44 MMHG

## 2018-05-11 VITALS — SYSTOLIC BLOOD PRESSURE: 113 MMHG | DIASTOLIC BLOOD PRESSURE: 40 MMHG

## 2018-05-11 VITALS — SYSTOLIC BLOOD PRESSURE: 88 MMHG | DIASTOLIC BLOOD PRESSURE: 29 MMHG

## 2018-05-11 VITALS — SYSTOLIC BLOOD PRESSURE: 116 MMHG | DIASTOLIC BLOOD PRESSURE: 69 MMHG

## 2018-05-11 VITALS — SYSTOLIC BLOOD PRESSURE: 107 MMHG | DIASTOLIC BLOOD PRESSURE: 36 MMHG

## 2018-05-11 VITALS — SYSTOLIC BLOOD PRESSURE: 117 MMHG | DIASTOLIC BLOOD PRESSURE: 51 MMHG

## 2018-05-11 VITALS — DIASTOLIC BLOOD PRESSURE: 49 MMHG | SYSTOLIC BLOOD PRESSURE: 120 MMHG

## 2018-05-11 VITALS — SYSTOLIC BLOOD PRESSURE: 113 MMHG | DIASTOLIC BLOOD PRESSURE: 38 MMHG

## 2018-05-11 LAB
ABSOLUTE BASOPHILS: 0 THOU/UL (ref 0–0.2)
ABSOLUTE EOSINOPHILS: 0.1 THOU/UL (ref 0–0.7)
ABSOLUTE MONOCYTES: 1 THOU/UL (ref 0–1.2)
ANION GAP SERPL CALC-SCNC: 9 MMOL/L (ref 7–16)
BASOPHILS NFR BLD AUTO: 0.3 %
BUN SERPL-MCNC: 30 MG/DL (ref 7–18)
CALCIUM SERPL-MCNC: 7.6 MG/DL (ref 8.5–10.1)
CHLORIDE SERPL-SCNC: 105 MMOL/L (ref 98–107)
CO2 SERPL-SCNC: 24 MMOL/L (ref 21–32)
CREAT SERPL-MCNC: 1.3 MG/DL (ref 0.6–1.3)
EOSINOPHIL NFR BLD: 0.8 %
GLUCOSE SERPL-MCNC: 112 MG/DL (ref 70–99)
GRANULOCYTES NFR BLD MANUAL: 77.2 %
HCT VFR BLD CALC: 23.9 % (ref 42–52)
HGB BLD-MCNC: 8.1 GM/DL (ref 14–18)
LYMPHOCYTES # BLD: 0.8 THOU/UL (ref 0.8–5.3)
LYMPHOCYTES NFR BLD AUTO: 10.1 %
MCH RBC QN AUTO: 31.5 PG (ref 26–34)
MCHC RBC AUTO-ENTMCNC: 33.8 G/DL (ref 28–37)
MCV RBC: 93.2 FL (ref 80–100)
MONOCYTES NFR BLD: 11.6 %
MPV: 7.2 FL. (ref 7.2–11.1)
NEUTROPHILS # BLD: 6.4 THOU/UL (ref 1.6–8.1)
NUCLEATED RBCS: 0 /100WBC
PLATELET COUNT*: 183 THOU/UL (ref 150–400)
POTASSIUM SERPL-SCNC: 4.1 MMOL/L (ref 3.5–5.1)
RBC # BLD AUTO: 2.57 MIL/UL (ref 4.5–6)
RDW-CV: 17.4 % (ref 10.5–14.5)
SODIUM SERPL-SCNC: 138 MMOL/L (ref 136–145)
WBC # BLD AUTO: 8.3 THOU/UL (ref 4–11)

## 2018-05-11 NOTE — NUR
ASSUMED PT CARE 1200. PT A/O X'S 4. FLAT. VSS. AFEBRILE. VPACED. PT SAT IN
CHAIR FOR 1.5 HOURS. DISCUSSED PT GOALS AND ENCOUARGED PT TO SIT FOR DINNER.
PT REPORTED HE WANTED TO SIT IN BED. PT REPORTS CHAIR HURTS HIS BACK. PT
VOIDING DARK YELLOW URINE. NO C/O PAIN. RIGHT FOOT DRESSING CHANGED. PT HAS
OWN SPECIALTY SHOES IN ROOM. SHOES PUT ON PT PER PT REQUEST WHEN GETTING OUT
OF BED.

## 2018-05-11 NOTE — NUR
PATIENT PROGRESSING TOWARDS GOALS. RIGHT GROIN SIT INTACT. PULSES PRESENT.
ADEQUATE PERFUSION TO EXTREMITIES. BP HAS REMAIND WNL. PT DENIES
PAIN/DISCOMFORT. HE WAS ABLE TO SLEEP OVERNIGHT. VITAL SINGS STABLE. NSR.
PATIENT HAS NO VOICED CONCERNS. Q2H TURNS, AFEBRILE. AT THIS TIME WILL
CONTINUE TO MONITOR CLOSELY.

## 2018-05-11 NOTE — NUR
Patient taking po well. denies SOA or Pain.  dangled at bedside. transfering
to Tele. incisions dry and intact. Gave report to Milka Flores Rn.

## 2018-05-12 VITALS — DIASTOLIC BLOOD PRESSURE: 44 MMHG | SYSTOLIC BLOOD PRESSURE: 107 MMHG

## 2018-05-12 VITALS — SYSTOLIC BLOOD PRESSURE: 130 MMHG | DIASTOLIC BLOOD PRESSURE: 44 MMHG

## 2018-05-12 VITALS — SYSTOLIC BLOOD PRESSURE: 92 MMHG | DIASTOLIC BLOOD PRESSURE: 48 MMHG

## 2018-05-12 VITALS — SYSTOLIC BLOOD PRESSURE: 100 MMHG | DIASTOLIC BLOOD PRESSURE: 41 MMHG

## 2018-05-12 VITALS — DIASTOLIC BLOOD PRESSURE: 44 MMHG | SYSTOLIC BLOOD PRESSURE: 105 MMHG

## 2018-05-12 VITALS — DIASTOLIC BLOOD PRESSURE: 46 MMHG | SYSTOLIC BLOOD PRESSURE: 110 MMHG

## 2018-05-12 NOTE — NUR
Pt is A&O. Resides at home with his wife. Known to this CM from previous
hospital stay. Supportive family that is involved in POC. Pt admitted for
revascularization. Current with [facility]. Hx of skilled at [facility]. Pt
is normally independent at home, shared ADLS with wife. Goal is to return home
at dc with [facility]. Following.

## 2018-05-12 NOTE — NUR
ASSUMED PT CARE AT 19:15 . REPORT RECEIVED FROM NURSE. PT IS ALERT, AWAKE,
ORIENTED X3 NOT ORIENTED TO TIME, NOT ABLE TO REMEMBER DATE. VITALS WITHIN
NORMAL LIMIT. OXYGEN SATURATION IS  96 ON RA. ASSESSMENT PERFORMED, REFER TO
CHART. PT DOES NOT COMPLAIN OF ANY PAIN BUT IS CONCERNED ABOUT HIS WOUNDS. AND
WANTS THE DRESSINGS TO BE DONE. R LEG DRESSING TO BE REMOVED AND WOUND TO BE
LEFT OPEN TO AIR AS ORDERED (SEE ORDERS). L. LEG WRAP TO BE REPLACED. DRESSING
ON R. PLANTAR AREA IS INTACT. WOUND PICTURES ARE IN THE CHART. WOUND CARE
CONSULT PLACED. NORMAL SALINE RUNNING AT 75 CC/HR IN LEFT FOREARM IV LINE.
OTHER L. FOREARM IV LINE IS INTACT.  . MEDICATIONS ADMINISTERED AS ORDERED.
FALL PRECAUTION IN PLACE. R. RADIAL AREA IS INTACT AND DRY. WILL CONTINUE TO
MONITOR.

## 2018-05-12 NOTE — NUR
ASSUMED CARE OF PT AT 0730. PT CONTINUES TO BE A&O TRACING V PACED ON THE
MONITOR. VSS ON ROOM AIR. PT HAS BEEN AMBULATING TO THE BATHROOM TODAY AND
AMBULATED IN THE RENNER WITH PT. PT C/O PAIN HAVE BEEN CONTROLLED WITH PRN PAIN
MEDICATIONS. PT HAS SOME N/V THIS AM BUT NONE SINCE AND HAS HAD A GOOD
APPETITE THIS AFTERNOON. PT DRESSING TO RLE AND FOOT CHANGED PER PT REQUEST.
PT CURRENTLY RESTING IN BED WATCHING TV, CALL LIGHT IN REACH AND NO APPARENT
SIGNS OF DISTRESS.

## 2018-05-13 VITALS — DIASTOLIC BLOOD PRESSURE: 59 MMHG | SYSTOLIC BLOOD PRESSURE: 121 MMHG

## 2018-05-13 VITALS — SYSTOLIC BLOOD PRESSURE: 111 MMHG | DIASTOLIC BLOOD PRESSURE: 52 MMHG

## 2018-05-13 VITALS — DIASTOLIC BLOOD PRESSURE: 53 MMHG | SYSTOLIC BLOOD PRESSURE: 133 MMHG

## 2018-05-13 VITALS — DIASTOLIC BLOOD PRESSURE: 53 MMHG | SYSTOLIC BLOOD PRESSURE: 151 MMHG

## 2018-05-13 VITALS — DIASTOLIC BLOOD PRESSURE: 46 MMHG | SYSTOLIC BLOOD PRESSURE: 102 MMHG

## 2018-05-13 NOTE — NUR
ASSUMED CARE OF PT AT 0730. PT CONTINUNES TO BE A&O X4 AND FORGETFUL. PT VSS
ON ROOM AIR. C/O PAIN CONTROLLED WITH PRM PAIN MEDS. PT UP TO RECLINER MOST OF
THE DAY TODAY. APPETITE IS DECENT AND PT HAS BEEN EATING 75% OF MEALS TODAY.
PT C/O CONSTIPATION AND GIVEN MOM WITH DINNER. PT V PACED ON THE MONITOR.
NURSING WILL CONTINUE TO MONITOR.

## 2018-05-14 VITALS — SYSTOLIC BLOOD PRESSURE: 133 MMHG | DIASTOLIC BLOOD PRESSURE: 62 MMHG

## 2018-05-14 VITALS — SYSTOLIC BLOOD PRESSURE: 126 MMHG | DIASTOLIC BLOOD PRESSURE: 58 MMHG

## 2018-05-14 VITALS — SYSTOLIC BLOOD PRESSURE: 169 MMHG | DIASTOLIC BLOOD PRESSURE: 55 MMHG

## 2018-05-14 VITALS — DIASTOLIC BLOOD PRESSURE: 58 MMHG | SYSTOLIC BLOOD PRESSURE: 155 MMHG

## 2018-05-14 LAB
ANION GAP SERPL CALC-SCNC: 6 MMOL/L (ref 7–16)
BUN SERPL-MCNC: 22 MG/DL (ref 7–18)
CALCIUM SERPL-MCNC: 7.7 MG/DL (ref 8.5–10.1)
CHLORIDE SERPL-SCNC: 111 MMOL/L (ref 98–107)
CO2 SERPL-SCNC: 26 MMOL/L (ref 21–32)
CREAT SERPL-MCNC: 1.2 MG/DL (ref 0.6–1.3)
GLUCOSE SERPL-MCNC: 96 MG/DL (ref 70–99)
HCT VFR BLD CALC: 21.7 % (ref 42–52)
HGB BLD-MCNC: 7.3 GM/DL (ref 14–18)
MCH RBC QN AUTO: 31.4 PG (ref 26–34)
MCHC RBC AUTO-ENTMCNC: 33.6 G/DL (ref 28–37)
MCV RBC: 93.4 FL (ref 80–100)
MPV: 7.1 FL. (ref 7.2–11.1)
PLATELET COUNT*: 164 THOU/UL (ref 150–400)
POTASSIUM SERPL-SCNC: 3.7 MMOL/L (ref 3.5–5.1)
RBC # BLD AUTO: 2.33 MIL/UL (ref 4.5–6)
RDW-CV: 17.7 % (ref 10.5–14.5)
SODIUM SERPL-SCNC: 143 MMOL/L (ref 136–145)
WBC # BLD AUTO: 5 THOU/UL (ref 4–11)

## 2018-05-14 NOTE — NUR
VSS, ASSUMED CARE IN THE AM, ASSESSMENT PERFROMED AND CHARTED, FALL
PRECAUTIONS IN PLACE AND CALL LIGHT IN REACH, PT IS A&O4 AND UP WITH ONE AND
WALKER, PT IS ON RA AND IS V-PACED ON THE MONITOR, PT STATES PAIN IN HIS FEET
AND LEGS, PT GOAL IS TO WORK WITH PT/OT AND SIT UP IN CHAIR. WILL FOLLOW WITH
PLAN OF CARE.

## 2018-05-14 NOTE — NUR
WOUND NURSE: PATIENT SEEN FOR WOUND CARE AND ASSESSMENT TO MULTIPLE WOUNDS.
RIGHT LISA-LATERAL LOWER LEG MEASURES 12.7 X 4.5 X 0.1 CM, DARK PURPLISH RED
TISSUE IN THE WOUND BED AND LARGE AMT OF SEROUS DRAINAGE.  RIGHT MEDIAL LOWER
LEG MEASURES 5.0 X 7.0 X 0.1 CM AND LARGE AMOUNT OF SEROUS DRAINAGE AND DARK
PURPLISH RED TISSUE IN THE WOUND  BED.  RIGHT DISTAL PLANTAR FOOT MEASURES 3.2
X 2. X 0.2 CM.  WOUND BED CONTAINS YELLOWISH NONGRANULATING TISSUE AND
MODERATE AMOUNT OF SEROUS DRAINAGEW NOTED FROM THIS WOUND.  FEW SMALL DRY
SCABS NOTED AROUND TOES.   ABOVE LESIONS TREATED WITH OPTIFOAM GENTLE AG AFTER
CLEANSING WITH WOUND CLEANSER AND GAUZE, EXCEPT SMALL DRY SCABS AROUND TOES
TX'D WITH BETADINE SWAB.  LOWER LEG WAS SUBSEQUENTLY WRAPPED WITH KERLEX ROLL
GAUZE AND SECURED WITH TAPE.  PATIENT ALSO HAS 3 CLOSED SURGICAL INCISIONS.
RIGHT GROIND WOUND MEASURES 0.1 X 5.0 X 0.1 CM.  RIGHT UPPER THIGH MEASURES
5.0 X 0.1 X 0.1 CM.  RIGHT LOWER LEG MEASURES 11.0 X 0.1 X 0.1 CM.  THESE
WOUNDS ARE CLOSED, CLEAN, AND DRY.  THESE ARE KEPT JOSHUA.  PATIENT INSTRUCTED ON
MEASURES TO PROMOTE HEALTH AND PREVENT FURTHER COMPLICATIONS.  ALSO INSTRUCTED
ON REPORTABLE COMPLICATIONS.  PATIENT AND FAMILY STATE THEY UNDERSTAND.

## 2018-05-14 NOTE — NUR
VSS, RECIEVED D/C INSTRUCTIONS, TOOK OUT IV AND TELE MONITOR, TOOK PT OUT VIA
WHEEL CHAIR TO CAR, PROVITED D/C INSTRUCTIONS AND FILLED OUT MEDICATION SHEET
AND D/C INSTRUCTIONS WHERE PROVITED TO FAMILY AND PATIENT, THEY DENIED ANY
QUESTIONS OR CONCERNS AT TIME OF D/C. HOURLY ROUNDS COMPLETED,

## 2018-05-14 NOTE — NUR
CONTINUE TO FOLLOW, MET WITH PT, WIFE AND SON.  PT HOPES TO GO HOME TODAY.  HE
LIVES WITH WIFE. STATES EXPERIENCE AT [facility] WAS NOT 'POSITIVE.'  PT
PLANS TO RETURN HOME WITH WIFE AND [facility].  PT HAS WALKER, CANE, HADN RAILS AND
GETTING LIFT CHAIR.  ALSO WALK IN SHOWER.  NO OTHER NEEDS ID'D

## 2018-05-30 ENCOUNTER — HOSPITAL ENCOUNTER (OUTPATIENT)
Dept: HOSPITAL 96 - M.WC | Age: 83
End: 2018-05-30
Attending: SURGERY
Payer: MEDICARE

## 2018-05-30 DIAGNOSIS — L97.511: ICD-10-CM

## 2018-05-30 DIAGNOSIS — I50.9: ICD-10-CM

## 2018-05-30 DIAGNOSIS — Z89.421: ICD-10-CM
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
## 2018-05-30 DIAGNOSIS — I25.10: ICD-10-CM

## 2018-05-30 DIAGNOSIS — Z95.0: ICD-10-CM

## 2018-05-30 DIAGNOSIS — T81.89XA: Primary | ICD-10-CM

## 2018-05-30 DIAGNOSIS — Z89.422: ICD-10-CM

## 2018-05-30 DIAGNOSIS — Z89.411: ICD-10-CM

## 2018-05-30 DIAGNOSIS — E78.5: ICD-10-CM
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
## 2018-05-30 DIAGNOSIS — I11.0: ICD-10-CM

## 2018-05-30 DIAGNOSIS — E11.621: ICD-10-CM

## 2018-05-30 DIAGNOSIS — E11.51: ICD-10-CM

## 2018-05-30 DIAGNOSIS — I70.735: ICD-10-CM

## 2018-06-06 ENCOUNTER — HOSPITAL ENCOUNTER (OUTPATIENT)
Dept: HOSPITAL 96 - M.WC | Age: 83
End: 2018-06-06
Attending: SURGERY
Payer: MEDICARE

## 2018-06-06 DIAGNOSIS — I11.0: ICD-10-CM

## 2018-06-06 DIAGNOSIS — T81.89XD: Primary | ICD-10-CM

## 2018-06-06 DIAGNOSIS — E11.622: ICD-10-CM

## 2018-06-06 DIAGNOSIS — E78.5: ICD-10-CM

## 2018-06-06 DIAGNOSIS — I70.735: ICD-10-CM

## 2018-06-06 DIAGNOSIS — Z89.422: ICD-10-CM

## 2018-06-06 DIAGNOSIS — I87.2: ICD-10-CM
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
## 2018-06-06 DIAGNOSIS — L97.511: ICD-10-CM

## 2018-06-06 DIAGNOSIS — I25.10: ICD-10-CM

## 2018-06-06 DIAGNOSIS — Z89.411: ICD-10-CM

## 2018-06-06 DIAGNOSIS — Z89.421: ICD-10-CM

## 2018-06-06 DIAGNOSIS — Z95.0: ICD-10-CM

## 2018-06-06 DIAGNOSIS — I50.9: ICD-10-CM

## 2018-06-06 DIAGNOSIS — E11.40: ICD-10-CM

## 2018-06-13 ENCOUNTER — HOSPITAL ENCOUNTER (OUTPATIENT)
Dept: HOSPITAL 96 - M.RAD | Age: 83
End: 2018-06-13
Attending: SURGERY
Payer: MEDICARE

## 2018-06-13 DIAGNOSIS — Z89.411: ICD-10-CM

## 2018-06-13 DIAGNOSIS — I25.10: ICD-10-CM

## 2018-06-13 DIAGNOSIS — E78.5: ICD-10-CM

## 2018-06-13 DIAGNOSIS — I11.0: ICD-10-CM

## 2018-06-13 DIAGNOSIS — I50.9: ICD-10-CM

## 2018-06-13 DIAGNOSIS — Z89.421: ICD-10-CM

## 2018-06-13 DIAGNOSIS — Z95.0: ICD-10-CM
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
## 2018-06-13 DIAGNOSIS — I70.735: ICD-10-CM

## 2018-06-13 DIAGNOSIS — E11.621: ICD-10-CM

## 2018-06-13 DIAGNOSIS — L97.511: ICD-10-CM

## 2018-06-13 DIAGNOSIS — T81.89XD: Primary | ICD-10-CM

## 2018-06-13 DIAGNOSIS — Z89.422: ICD-10-CM

## 2018-06-13 DIAGNOSIS — E11.51: ICD-10-CM

## 2018-06-14 ENCOUNTER — HOSPITAL ENCOUNTER (OUTPATIENT)
Dept: HOSPITAL 96 - M.WC | Age: 83
End: 2018-06-14
Attending: SURGERY
Payer: MEDICARE

## 2018-06-14 DIAGNOSIS — I70.735: ICD-10-CM

## 2018-06-14 DIAGNOSIS — I50.9: ICD-10-CM

## 2018-06-14 DIAGNOSIS — Z95.0: ICD-10-CM

## 2018-06-14 DIAGNOSIS — I11.0: ICD-10-CM

## 2018-06-14 DIAGNOSIS — Z89.421: ICD-10-CM

## 2018-06-14 DIAGNOSIS — E78.5: ICD-10-CM

## 2018-06-14 DIAGNOSIS — L97.516: ICD-10-CM

## 2018-06-14 DIAGNOSIS — E11.51: ICD-10-CM

## 2018-06-14 DIAGNOSIS — Z89.411: ICD-10-CM

## 2018-06-14 DIAGNOSIS — E11.40: ICD-10-CM

## 2018-06-14 DIAGNOSIS — I25.10: ICD-10-CM

## 2018-06-14 DIAGNOSIS — Z89.422: ICD-10-CM

## 2018-06-14 DIAGNOSIS — E11.621: Primary | ICD-10-CM

## 2018-06-18 ENCOUNTER — HOSPITAL ENCOUNTER (OUTPATIENT)
Dept: HOSPITAL 96 - M.WC | Age: 83
End: 2018-06-18
Attending: SURGERY
Payer: MEDICARE

## 2018-06-18 DIAGNOSIS — I25.10: ICD-10-CM

## 2018-06-18 DIAGNOSIS — Z89.422: ICD-10-CM

## 2018-06-18 DIAGNOSIS — Z95.0: ICD-10-CM

## 2018-06-18 DIAGNOSIS — I11.0: ICD-10-CM

## 2018-06-18 DIAGNOSIS — I70.735: ICD-10-CM

## 2018-06-18 DIAGNOSIS — Z89.411: ICD-10-CM

## 2018-06-18 DIAGNOSIS — E78.5: ICD-10-CM

## 2018-06-18 DIAGNOSIS — E11.621: Primary | ICD-10-CM

## 2018-06-18 DIAGNOSIS — Z89.421: ICD-10-CM

## 2018-06-18 DIAGNOSIS — I50.9: ICD-10-CM

## 2018-06-18 DIAGNOSIS — E11.51: ICD-10-CM

## 2018-06-18 DIAGNOSIS — L97.511: ICD-10-CM

## 2018-06-20 ENCOUNTER — HOSPITAL ENCOUNTER (OUTPATIENT)
Dept: HOSPITAL 96 - M.WC | Age: 83
End: 2018-06-20
Attending: SURGERY
Payer: MEDICARE

## 2018-06-20 DIAGNOSIS — E11.621: Primary | ICD-10-CM

## 2018-06-20 DIAGNOSIS — Z89.422: ICD-10-CM

## 2018-06-20 DIAGNOSIS — I11.0: ICD-10-CM

## 2018-06-20 DIAGNOSIS — Z89.411: ICD-10-CM

## 2018-06-20 DIAGNOSIS — E11.51: ICD-10-CM

## 2018-06-20 DIAGNOSIS — Z89.421: ICD-10-CM

## 2018-06-20 DIAGNOSIS — S81.812D: ICD-10-CM

## 2018-06-20 DIAGNOSIS — I70.735: ICD-10-CM

## 2018-06-20 DIAGNOSIS — E78.5: ICD-10-CM
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
## 2018-06-20 DIAGNOSIS — I50.9: ICD-10-CM

## 2018-06-20 DIAGNOSIS — L97.511: ICD-10-CM

## 2018-06-20 NOTE — OP
[address]  [address]                    OPERATIVE REPORT              
_______________________________________________________________________________
 
Name:       YAKOV RODRIGUEZ             Room:           [address] 
HENRY#:  [id number]      Account #:      [id number]  
Admission:  04/25/18     Attend Phys:    KAMI Littlejohn
Discharge:  04/26/18     Date of Birth:  [birth year]  
         Report #: [id number]
                                                                     [id number]  
_______________________________________________________________________________
THIS REPORT FOR:  //name//                      
 
CC: Santosh Silva
 
DATE OF SERVICE:  04/25/2018
 
 
PREOPERATIVE DIAGNOSIS:  Peripheral vascular disease and gangrene, right lower
extremity.
 
POSTOPERATIVE DIAGNOSIS:  Peripheral vascular disease and gangrene, right lower
extremity.
 
SURGEON:  Santosh Roman DO
 
ASSISTANT:  None.
 
PROCEDURES:
1.  Ultrasound-guided access, left common femoral artery.
2.  Aortogram.
3.  Right lower extremity angiogram catheter position third order.
4.  Atherectomy, right superficial femoral artery, popliteal artery and tibial
vessels.
5.  Retrograde posterior tibial access with attempted retrograde Crosser
atherectomy.
6.  Angioplasty of popliteal artery.
7.  Limited angiogram, left common femoral artery.
8.  Angio-Seal closure, left common femoral artery.
 
ANESTHESIA:  Moderate sedation.
 
ESTIMATED BLOOD LOSS:  Minimal.
 
SPECIMEN:  None.
 
COMPLICATIONS:  None.
 
CONDITION:  Stable.
 
DISPOSITION:  Home.
 
INDICATIONS FOR THE PROCEDURE AND CONSENT:  The patient is an 85-year-old male
well known to me for severe peripheral vascular disease bilaterally, multiple
ulcers, which have healed in the past with intervention.  The patient returns
 
 
 
[facility] 
[address] [place]  [address]                    OPERATIVE REPORT              
_______________________________________________________________________________
 
Name:       YAKOV RODRIGUEZ             Room:           [address] Tacho FIGUEROA#:  [id number]      Account #:      [id number]  
Admission:  04/25/18     Attend Phys:    KAMI Littlejohn
Discharge:  04/26/18     Date of Birth:  [birth year]  
         Report #: [id number]
                                                                     [id number]  
_______________________________________________________________________________
with gangrene and wounds on his right lower extremity.  His previous popliteal
artery stent seems to be occluded.  Recommendation for right lower extremity
angiogram and possible intervention was made.  Risks and benefits were
discussed, infection, bleeding, need for additional procedures including bypass,
amputation despite intervention, stroke, heart attack, death.  The patient
wished to proceed, was consented and scheduled.
 
PROCEDURE IN DETAIL:  After timeout was performed, the patient was placed in
supine position with sterile prep and drape of the anterior abdomen, bilateral
groins and bilateral thighs.  Ultrasound was utilized to identify the left
common femoral artery and Seldinger technique was used to place 6-Czech sheath.
 Glidewire Advantage and UF catheter were advanced in the infrarenal aorta and
aortogram performed.
 
The aortogram demonstrated:
1.  A small abdominal aortic aneurysm, which was known.
2.  The aorta, bilateral common iliac, internal and external iliac arteries
appear widely patent without flow limitation or stenosis.  They were noted to be
somewhat tortuous bilaterally.
 
The Glidewire Advantage and UF catheter were advanced into the right external
iliac artery and right lower extremity angiogram performed.  This demonstrated
the right common femoral artery to be widely patent without flow limitation or
stenosis, as was the proximal superficial femoral artery and profunda vessel. 
Glidewire Advantage was then advanced into the right superficial femoral artery
and an up and over 6-Czech sheath advanced into the proximal superficial
femoral artery, and 6000 units of heparin was administered.
 
I then obtained dedicated views of the distal superficial femoral artery and
popliteal vessel.  This demonstrated multiple small collaterals with occlusion
of the distal superficial femoral artery, popliteal and down to the origin of
the tibioperoneal trunk.  The anterior tibial vessel was not visualized.  The
distal tibial runoff demonstrated small diminutive vessels.  The anterior tibial
did demonstrate reconstitution distally beginning in the mid calf, as did the
posterior tibial.  The peroneal artery was not well visualized.  I then advanced
the seeker Catheter over Glidewire Advantage down to the popliteal occlusion and
the Glidewire Advantage was advanced easily down to approximately 2 cm above the
popliteal artery stent.  This gave a feeling of being intraluminal and I
therefore obtained a Seeker catheter, advanced over wire and performed a limited
angiogram of this area.  This demonstrated the catheter to be in the true lumen
with visualization of the large collateral.  I then decided that a Crosser
atherectomy from this point would be beneficial to crossing the complete total
occlusion.  The Usher catheter and Crosser device advanced to this point and
activated.  It did advance very readily into the distal popliteal above the
tibioperoneal trunk.  Repeat angiography through the Usher catheter demonstrated
the tibioperoneal trunk, but there appeared to be an AV connection between the
 
 
 
[place] [address]                    OPERATIVE REPORT              
_______________________________________________________________________________
 
Name:       YAKOV RODRIGUEZ             Room:           [address]    JUWAN FIGUEROA#:  [id number]      Account #:      [id number]  
Admission:  04/25/18     Attend Phys:    KAMI Littlejohn
Discharge:  04/26/18     Date of Birth:  [birth year]  
         Report #: [id number]
                                                                     [id number]  
_______________________________________________________________________________
arterial and main popliteal vein.  I attempted to negotiate into the
tibioperoneal trunk and using a Seeker catheter and Glidewire Advantage from
this point, I was only able to get into a dissection plane.  I was able to
visualize actually the peroneal vessel very well from this standpoint, but I was
not able to negotiate the wire into it.  I tried to retract and reimage under
magnified views and renegotiated with an 0.018 Glidewire Advantage, but was
still unable to do so.  Repeat imaging for more proximal advantage point
visualized the tibioperoneal trunk and actually the anterior tibial vessel at
this point, please see saved images.  I made multiple attempts to try and cross
this area from above and felt that maybe it would be easier from a retrograde
approach.  I asked the team to sterilely prep and drape the lower extremity and
using ultrasound guidance, I was able to access the right posterior tibial
vessel.  With a 5-Czech micropuncture sheath, I confirmed true luminal position
with arteriography and this again demonstrated excellent retrograde flow into
the posterior tibial vessel and the peroneal vessel appeared to be filled from
this vantage point and briskly flow.  I then obtained the 5-Czech sheath and
the Usher catheter and the Crosser device and attempted retrograde crossing. 
This again advanced quite easily all the way into the distal superficial femoral
artery, but imaging demonstrated to be in a dissection plane and not within the
true lumen.  I then exchanged the 0.018 Glidewire Advantage and attempted to
balloon in the area where the wire from above and below cross to see if I could
gain access to the true lumen in this way and this was also unsuccessful. 
Ultimately, I decided that the reopening of the popliteal artery was not
possible today and terminated the procedure.  I removed the 5-Czech sheath and
pressure was held for hemostasis.  I then performed a limited angiogram of left
common femoral artery.  It was noted to be appropriate for Angio-Seal closure. 
A 6-Czech Angio-Seal was selected and deployed in standard fashion.  The
patient tolerated the procedure well.  All lap, needle and instrument counts
correct.
 
Of note, the heparin was redosed every hour with an additional 1000 units of
heparin.
 
 
 
 
 
 
 
 
 
 
 
 
<ELECTRONICALLY SIGNED>
                                        By:  Santosh Roman DO        
06/20/18     0858
D: 06/11/18 1829_______________________________________
T: 06/11/18 1908Santosh Roman DO           /nt

## 2018-06-22 ENCOUNTER — HOSPITAL ENCOUNTER (OUTPATIENT)
Dept: HOSPITAL 96 - M.WC | Age: 83
End: 2018-06-22
Attending: SURGERY
Payer: MEDICARE

## 2018-06-22 DIAGNOSIS — I50.9: ICD-10-CM

## 2018-06-22 DIAGNOSIS — S81.812D: ICD-10-CM

## 2018-06-22 DIAGNOSIS — Z89.411: ICD-10-CM

## 2018-06-22 DIAGNOSIS — I70.735: ICD-10-CM

## 2018-06-22 DIAGNOSIS — E11.51: ICD-10-CM

## 2018-06-22 DIAGNOSIS — Z89.421: ICD-10-CM

## 2018-06-22 DIAGNOSIS — E11.621: Primary | ICD-10-CM
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
## 2018-06-22 DIAGNOSIS — E78.5: ICD-10-CM

## 2018-06-22 DIAGNOSIS — L97.511: ICD-10-CM

## 2018-06-22 DIAGNOSIS — I11.0: ICD-10-CM

## 2018-06-22 DIAGNOSIS — I25.10: ICD-10-CM

## 2018-06-22 DIAGNOSIS — Z95.0: ICD-10-CM

## 2018-06-22 DIAGNOSIS — Z89.422: ICD-10-CM

## 2018-06-25 ENCOUNTER — HOSPITAL ENCOUNTER (OUTPATIENT)
Dept: HOSPITAL 96 - M.WC | Age: 83
End: 2018-06-25
Attending: SURGERY
Payer: MEDICARE

## 2018-06-25 DIAGNOSIS — E11.621: Primary | ICD-10-CM

## 2018-06-25 DIAGNOSIS — E11.51: ICD-10-CM

## 2018-06-25 DIAGNOSIS — I50.9: ICD-10-CM

## 2018-06-25 DIAGNOSIS — Z89.411: ICD-10-CM

## 2018-06-25 DIAGNOSIS — L97.511: ICD-10-CM

## 2018-06-25 DIAGNOSIS — S81.812D: ICD-10-CM

## 2018-06-25 DIAGNOSIS — I11.0: ICD-10-CM

## 2018-06-25 DIAGNOSIS — Z89.422: ICD-10-CM
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
## 2018-06-25 DIAGNOSIS — I25.10: ICD-10-CM

## 2018-06-25 DIAGNOSIS — Z89.421: ICD-10-CM

## 2018-06-25 DIAGNOSIS — E78.5: ICD-10-CM

## 2018-06-27 ENCOUNTER — HOSPITAL ENCOUNTER (OUTPATIENT)
Dept: HOSPITAL 96 - M.WC | Age: 83
End: 2018-06-27
Attending: FAMILY MEDICINE
Payer: MEDICARE

## 2018-06-27 DIAGNOSIS — I11.0: ICD-10-CM

## 2018-06-27 DIAGNOSIS — E78.5: ICD-10-CM
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
## 2018-06-27 DIAGNOSIS — Z89.421: ICD-10-CM

## 2018-06-27 DIAGNOSIS — Z95.0: ICD-10-CM

## 2018-06-27 DIAGNOSIS — I70.735: ICD-10-CM

## 2018-06-27 DIAGNOSIS — Z89.411: ICD-10-CM

## 2018-06-27 DIAGNOSIS — E11.621: Primary | ICD-10-CM

## 2018-06-27 DIAGNOSIS — I50.9: ICD-10-CM

## 2018-06-27 DIAGNOSIS — L97.511: ICD-10-CM

## 2018-06-27 DIAGNOSIS — E11.51: ICD-10-CM

## 2018-06-27 DIAGNOSIS — Z89.422: ICD-10-CM

## 2018-06-27 DIAGNOSIS — I25.10: ICD-10-CM

## 2018-06-27 DIAGNOSIS — S81.812D: ICD-10-CM

## 2018-06-29 ENCOUNTER — HOSPITAL ENCOUNTER (OUTPATIENT)
Dept: HOSPITAL 96 - M.WC | Age: 83
End: 2018-06-29
Attending: SURGERY
Payer: MEDICARE

## 2018-06-29 DIAGNOSIS — Z89.421: ICD-10-CM
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
## 2018-06-29 DIAGNOSIS — Z95.1: ICD-10-CM

## 2018-06-29 DIAGNOSIS — I11.0: ICD-10-CM

## 2018-06-29 DIAGNOSIS — Z95.0: ICD-10-CM

## 2018-06-29 DIAGNOSIS — E11.622: ICD-10-CM

## 2018-06-29 DIAGNOSIS — Z86.31: ICD-10-CM

## 2018-06-29 DIAGNOSIS — Z89.422: ICD-10-CM

## 2018-06-29 DIAGNOSIS — I50.9: ICD-10-CM

## 2018-06-29 DIAGNOSIS — E11.51: ICD-10-CM

## 2018-06-29 DIAGNOSIS — E11.621: Primary | ICD-10-CM

## 2018-06-29 DIAGNOSIS — L97.811: ICD-10-CM

## 2018-06-29 DIAGNOSIS — I70.235: ICD-10-CM

## 2018-06-29 DIAGNOSIS — E78.5: ICD-10-CM

## 2018-06-29 DIAGNOSIS — Z89.411: ICD-10-CM

## 2018-06-29 DIAGNOSIS — I25.10: ICD-10-CM

## 2018-06-29 DIAGNOSIS — L97.511: ICD-10-CM

## 2018-06-29 DIAGNOSIS — S81.811D: ICD-10-CM

## 2018-07-02 ENCOUNTER — HOSPITAL ENCOUNTER (OUTPATIENT)
Dept: HOSPITAL 96 - M.WC | Age: 83
End: 2018-07-02
Attending: SURGERY
Payer: MEDICARE

## 2018-07-02 DIAGNOSIS — E78.5: ICD-10-CM

## 2018-07-02 DIAGNOSIS — E11.51: ICD-10-CM

## 2018-07-02 DIAGNOSIS — L97.811: ICD-10-CM

## 2018-07-02 DIAGNOSIS — E11.622: ICD-10-CM

## 2018-07-02 DIAGNOSIS — I50.9: ICD-10-CM

## 2018-07-02 DIAGNOSIS — L97.511: ICD-10-CM

## 2018-07-02 DIAGNOSIS — I11.0: ICD-10-CM

## 2018-07-02 DIAGNOSIS — S81.812D: ICD-10-CM

## 2018-07-02 DIAGNOSIS — E11.621: Primary | ICD-10-CM
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
## 2018-07-02 DIAGNOSIS — I25.10: ICD-10-CM

## 2018-07-02 DIAGNOSIS — Z95.0: ICD-10-CM

## 2018-07-02 DIAGNOSIS — Z89.422: ICD-10-CM

## 2018-07-02 DIAGNOSIS — Z89.411: ICD-10-CM

## 2018-07-05 ENCOUNTER — HOSPITAL ENCOUNTER (OUTPATIENT)
Dept: HOSPITAL 96 - M.WC | Age: 83
End: 2018-07-05
Attending: SURGERY
Payer: MEDICARE

## 2018-07-05 DIAGNOSIS — Z89.411: ICD-10-CM

## 2018-07-05 DIAGNOSIS — E78.5: ICD-10-CM

## 2018-07-05 DIAGNOSIS — E11.622: ICD-10-CM

## 2018-07-05 DIAGNOSIS — I70.735: ICD-10-CM

## 2018-07-05 DIAGNOSIS — S81.812D: ICD-10-CM
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
## 2018-07-05 DIAGNOSIS — I50.9: ICD-10-CM

## 2018-07-05 DIAGNOSIS — E11.621: Primary | ICD-10-CM

## 2018-07-05 DIAGNOSIS — I11.0: ICD-10-CM

## 2018-07-05 DIAGNOSIS — Z95.0: ICD-10-CM

## 2018-07-05 DIAGNOSIS — E11.21: ICD-10-CM

## 2018-07-05 DIAGNOSIS — L97.811: ICD-10-CM

## 2018-07-05 DIAGNOSIS — L97.511: ICD-10-CM

## 2018-07-05 DIAGNOSIS — Z89.421: ICD-10-CM

## 2018-07-05 DIAGNOSIS — E11.51: ICD-10-CM

## 2018-07-05 DIAGNOSIS — Z89.422: ICD-10-CM

## 2018-07-05 DIAGNOSIS — I25.10: ICD-10-CM

## 2018-07-06 ENCOUNTER — HOSPITAL ENCOUNTER (OUTPATIENT)
Dept: HOSPITAL 96 - M.WC | Age: 83
End: 2018-07-06
Attending: SURGERY
Payer: MEDICARE

## 2018-07-06 DIAGNOSIS — L97.511: ICD-10-CM

## 2018-07-06 DIAGNOSIS — Z95.0: ICD-10-CM

## 2018-07-06 DIAGNOSIS — Z89.422: ICD-10-CM

## 2018-07-06 DIAGNOSIS — Z89.411: ICD-10-CM

## 2018-07-06 DIAGNOSIS — E11.21: ICD-10-CM

## 2018-07-06 DIAGNOSIS — I70.735: ICD-10-CM

## 2018-07-06 DIAGNOSIS — E11.51: ICD-10-CM

## 2018-07-06 DIAGNOSIS — I25.10: ICD-10-CM

## 2018-07-06 DIAGNOSIS — Z95.1: ICD-10-CM

## 2018-07-06 DIAGNOSIS — I50.9: ICD-10-CM

## 2018-07-06 DIAGNOSIS — E11.621: Primary | ICD-10-CM

## 2018-07-06 DIAGNOSIS — E78.5: ICD-10-CM

## 2018-07-06 DIAGNOSIS — I11.0: ICD-10-CM

## 2018-07-06 DIAGNOSIS — Z89.421: ICD-10-CM

## 2018-07-09 ENCOUNTER — HOSPITAL ENCOUNTER (OUTPATIENT)
Dept: HOSPITAL 96 - M.WC | Age: 83
End: 2018-07-09
Attending: SURGERY
Payer: MEDICARE

## 2018-07-09 DIAGNOSIS — I11.0: ICD-10-CM

## 2018-07-09 DIAGNOSIS — I25.10: ICD-10-CM

## 2018-07-09 DIAGNOSIS — Z89.421: ICD-10-CM

## 2018-07-09 DIAGNOSIS — I50.9: ICD-10-CM

## 2018-07-09 DIAGNOSIS — Z89.422: ICD-10-CM

## 2018-07-09 DIAGNOSIS — E78.5: ICD-10-CM

## 2018-07-09 DIAGNOSIS — Z89.411: ICD-10-CM

## 2018-07-09 DIAGNOSIS — I70.735: ICD-10-CM

## 2018-07-09 DIAGNOSIS — E11.621: Primary | ICD-10-CM

## 2018-07-09 DIAGNOSIS — L97.511: ICD-10-CM

## 2018-07-09 DIAGNOSIS — Z95.0: ICD-10-CM

## 2018-07-09 DIAGNOSIS — E11.51: ICD-10-CM

## 2018-07-11 ENCOUNTER — HOSPITAL ENCOUNTER (OUTPATIENT)
Dept: HOSPITAL 96 - M.WC | Age: 83
End: 2018-07-11
Attending: SURGERY
Payer: MEDICARE

## 2018-07-11 DIAGNOSIS — I11.0: ICD-10-CM

## 2018-07-11 DIAGNOSIS — E11.51: ICD-10-CM

## 2018-07-11 DIAGNOSIS — E11.621: Primary | ICD-10-CM

## 2018-07-11 DIAGNOSIS — Z89.411: ICD-10-CM

## 2018-07-11 DIAGNOSIS — I50.9: ICD-10-CM

## 2018-07-11 DIAGNOSIS — L97.511: ICD-10-CM

## 2018-07-11 DIAGNOSIS — I25.10: ICD-10-CM

## 2018-07-11 DIAGNOSIS — Z89.422: ICD-10-CM

## 2018-07-11 DIAGNOSIS — Z89.421: ICD-10-CM

## 2018-07-11 DIAGNOSIS — E11.21: ICD-10-CM

## 2018-07-11 DIAGNOSIS — E11.622: ICD-10-CM

## 2018-07-11 DIAGNOSIS — E78.5: ICD-10-CM

## 2018-07-11 DIAGNOSIS — L97.821: ICD-10-CM

## 2018-07-11 DIAGNOSIS — Z95.0: ICD-10-CM

## 2018-07-11 DIAGNOSIS — I70.735: ICD-10-CM

## 2018-07-13 ENCOUNTER — HOSPITAL ENCOUNTER (OUTPATIENT)
Dept: HOSPITAL 96 - M.WC | Age: 83
End: 2018-07-13
Attending: SURGERY
Payer: MEDICARE

## 2018-07-13 DIAGNOSIS — L97.511: ICD-10-CM

## 2018-07-13 DIAGNOSIS — I25.10: ICD-10-CM

## 2018-07-13 DIAGNOSIS — I50.9: ICD-10-CM

## 2018-07-13 DIAGNOSIS — Z89.422: ICD-10-CM

## 2018-07-13 DIAGNOSIS — I11.0: ICD-10-CM

## 2018-07-13 DIAGNOSIS — E11.51: ICD-10-CM

## 2018-07-13 DIAGNOSIS — I70.735: ICD-10-CM

## 2018-07-13 DIAGNOSIS — E78.5: ICD-10-CM

## 2018-07-13 DIAGNOSIS — E11.622: Primary | ICD-10-CM

## 2018-07-13 DIAGNOSIS — E11.621: ICD-10-CM

## 2018-07-13 DIAGNOSIS — Z89.421: ICD-10-CM

## 2018-07-13 DIAGNOSIS — L97.821: ICD-10-CM

## 2018-07-13 DIAGNOSIS — Z89.411: ICD-10-CM

## 2018-07-13 DIAGNOSIS — E11.21: ICD-10-CM

## 2018-07-16 ENCOUNTER — HOSPITAL ENCOUNTER (OUTPATIENT)
Dept: HOSPITAL 96 - M.WC | Age: 83
End: 2018-07-16
Attending: SURGERY
Payer: MEDICARE

## 2018-07-16 DIAGNOSIS — E78.5: ICD-10-CM

## 2018-07-16 DIAGNOSIS — E11.621: Primary | ICD-10-CM

## 2018-07-16 DIAGNOSIS — E11.622: ICD-10-CM

## 2018-07-16 DIAGNOSIS — I50.9: ICD-10-CM

## 2018-07-16 DIAGNOSIS — L97.511: ICD-10-CM

## 2018-07-16 DIAGNOSIS — I11.0: ICD-10-CM

## 2018-07-16 DIAGNOSIS — I25.10: ICD-10-CM

## 2018-07-16 DIAGNOSIS — Z89.421: ICD-10-CM

## 2018-07-16 DIAGNOSIS — E11.51: ICD-10-CM

## 2018-07-16 DIAGNOSIS — Z89.422: ICD-10-CM

## 2018-07-16 DIAGNOSIS — Z89.411: ICD-10-CM

## 2018-07-16 DIAGNOSIS — L97.821: ICD-10-CM

## 2018-07-18 ENCOUNTER — HOSPITAL ENCOUNTER (OUTPATIENT)
Dept: HOSPITAL 96 - M.WC | Age: 83
End: 2018-07-18
Attending: SURGERY
Payer: MEDICARE

## 2018-07-18 DIAGNOSIS — Z89.411: ICD-10-CM

## 2018-07-18 DIAGNOSIS — I25.10: ICD-10-CM

## 2018-07-18 DIAGNOSIS — Z89.421: ICD-10-CM

## 2018-07-18 DIAGNOSIS — I70.735: ICD-10-CM

## 2018-07-18 DIAGNOSIS — E78.5: ICD-10-CM

## 2018-07-18 DIAGNOSIS — E11.622: ICD-10-CM

## 2018-07-18 DIAGNOSIS — L97.821: ICD-10-CM

## 2018-07-18 DIAGNOSIS — Z95.1: ICD-10-CM

## 2018-07-18 DIAGNOSIS — E11.621: Primary | ICD-10-CM

## 2018-07-18 DIAGNOSIS — I11.0: ICD-10-CM

## 2018-07-18 DIAGNOSIS — I50.9: ICD-10-CM

## 2018-07-18 DIAGNOSIS — E11.51: ICD-10-CM

## 2018-07-18 DIAGNOSIS — Z95.0: ICD-10-CM
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
## 2018-07-18 DIAGNOSIS — Z89.422: ICD-10-CM

## 2018-07-18 DIAGNOSIS — L97.511: ICD-10-CM

## 2018-07-18 DIAGNOSIS — S91.312D: ICD-10-CM

## 2018-07-23 ENCOUNTER — HOSPITAL ENCOUNTER (OUTPATIENT)
Dept: HOSPITAL 96 - M.WC | Age: 83
End: 2018-07-23
Attending: SURGERY
Payer: MEDICARE

## 2018-07-23 ENCOUNTER — HOSPITAL ENCOUNTER (INPATIENT)
Dept: HOSPITAL 96 - M.ERS | Age: 83
LOS: 21 days | Discharge: NURSING, REHAB OR LONG-TERM CARE | DRG: 871 | End: 2018-08-13
Attending: INTERNAL MEDICINE | Admitting: INTERNAL MEDICINE
Payer: MEDICARE

## 2018-07-23 VITALS — DIASTOLIC BLOOD PRESSURE: 59 MMHG | SYSTOLIC BLOOD PRESSURE: 122 MMHG

## 2018-07-23 VITALS — DIASTOLIC BLOOD PRESSURE: 59 MMHG | SYSTOLIC BLOOD PRESSURE: 126 MMHG

## 2018-07-23 VITALS — WEIGHT: 156.97 LBS | HEIGHT: 72.01 IN | BODY MASS INDEX: 21.26 KG/M2

## 2018-07-23 VITALS — SYSTOLIC BLOOD PRESSURE: 136 MMHG | DIASTOLIC BLOOD PRESSURE: 65 MMHG

## 2018-07-23 VITALS — SYSTOLIC BLOOD PRESSURE: 127 MMHG | DIASTOLIC BLOOD PRESSURE: 60 MMHG

## 2018-07-23 DIAGNOSIS — Z88.0: ICD-10-CM

## 2018-07-23 DIAGNOSIS — N18.3: ICD-10-CM

## 2018-07-23 DIAGNOSIS — G93.40: ICD-10-CM

## 2018-07-23 DIAGNOSIS — L97.519: ICD-10-CM

## 2018-07-23 DIAGNOSIS — I48.2: ICD-10-CM

## 2018-07-23 DIAGNOSIS — L03.115: Primary | ICD-10-CM
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
## 2018-07-23 DIAGNOSIS — Z95.0: ICD-10-CM

## 2018-07-23 DIAGNOSIS — E11.621: ICD-10-CM

## 2018-07-23 DIAGNOSIS — Z91.040: ICD-10-CM

## 2018-07-23 DIAGNOSIS — Z79.2: ICD-10-CM

## 2018-07-23 DIAGNOSIS — Z88.8: ICD-10-CM

## 2018-07-23 DIAGNOSIS — R31.0: ICD-10-CM

## 2018-07-23 DIAGNOSIS — E43: ICD-10-CM

## 2018-07-23 DIAGNOSIS — Z89.411: ICD-10-CM

## 2018-07-23 DIAGNOSIS — Z79.899: ICD-10-CM

## 2018-07-23 DIAGNOSIS — A41.01: Primary | ICD-10-CM

## 2018-07-23 DIAGNOSIS — E11.65: ICD-10-CM

## 2018-07-23 DIAGNOSIS — E11.22: ICD-10-CM

## 2018-07-23 DIAGNOSIS — I73.9: ICD-10-CM

## 2018-07-23 DIAGNOSIS — N05.8: ICD-10-CM

## 2018-07-23 DIAGNOSIS — E78.5: ICD-10-CM

## 2018-07-23 DIAGNOSIS — Z89.422: ICD-10-CM

## 2018-07-23 DIAGNOSIS — Z79.01: ICD-10-CM

## 2018-07-23 DIAGNOSIS — I48.91: ICD-10-CM

## 2018-07-23 DIAGNOSIS — I25.2: ICD-10-CM

## 2018-07-23 DIAGNOSIS — Z79.82: ICD-10-CM

## 2018-07-23 DIAGNOSIS — I42.9: ICD-10-CM

## 2018-07-23 DIAGNOSIS — D68.9: ICD-10-CM

## 2018-07-23 DIAGNOSIS — I96: ICD-10-CM

## 2018-07-23 DIAGNOSIS — Z79.4: ICD-10-CM

## 2018-07-23 DIAGNOSIS — E87.1: ICD-10-CM

## 2018-07-23 DIAGNOSIS — Z87.891: ICD-10-CM

## 2018-07-23 DIAGNOSIS — E11.52: ICD-10-CM

## 2018-07-23 DIAGNOSIS — D64.9: ICD-10-CM

## 2018-07-23 DIAGNOSIS — I25.10: ICD-10-CM
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
## 2018-07-23 DIAGNOSIS — M86.8X7: ICD-10-CM

## 2018-07-23 DIAGNOSIS — I49.5: ICD-10-CM

## 2018-07-23 DIAGNOSIS — I50.9: ICD-10-CM

## 2018-07-23 DIAGNOSIS — F03.90: ICD-10-CM

## 2018-07-23 DIAGNOSIS — Z95.2: ICD-10-CM

## 2018-07-23 DIAGNOSIS — L03.115: ICD-10-CM

## 2018-07-23 DIAGNOSIS — N17.9: ICD-10-CM

## 2018-07-23 LAB
ABSOLUTE EOSINOPHILS: 0.3 THOU/UL (ref 0–0.7)
ABSOLUTE MONOCYTES: 0.6 THOU/UL (ref 0–1.2)
ALBUMIN SERPL-MCNC: 2.9 G/DL (ref 3.4–5)
ALP SERPL-CCNC: 93 U/L (ref 46–116)
ALT SERPL-CCNC: 22 U/L (ref 30–65)
ANION GAP SERPL CALC-SCNC: 5 MMOL/L (ref 7–16)
ANISOCYTOSIS BLD QL SMEAR: (no result)
AST SERPL-CCNC: 36 U/L (ref 15–37)
BILIRUB SERPL-MCNC: 0.5 MG/DL
BUN SERPL-MCNC: 32 MG/DL (ref 7–18)
CALCIUM SERPL-MCNC: 9.5 MG/DL (ref 8.5–10.1)
CHLORIDE SERPL-SCNC: 102 MMOL/L (ref 98–107)
CO2 SERPL-SCNC: 30 MMOL/L (ref 21–32)
CREAT SERPL-MCNC: 1.5 MG/DL (ref 0.6–1.3)
EOSINOPHIL NFR BLD: 2 %
ESR (SEDRATE): 95 MM/HR (ref 0–20)
GLUCOSE SERPL-MCNC: 156 MG/DL (ref 70–99)
GRANULOCYTES NFR BLD MANUAL: 87 %
HCT VFR BLD CALC: 39.3 % (ref 42–52)
HGB BLD-MCNC: 12.8 GM/DL (ref 14–18)
INR PPP: 2.9
LYMPHOCYTES # BLD: 1 THOU/UL (ref 0.8–5.3)
LYMPHOCYTES NFR BLD AUTO: 7 %
MCH RBC QN AUTO: 30 PG (ref 26–34)
MCHC RBC AUTO-ENTMCNC: 32.6 G/DL (ref 28–37)
MCV RBC: 91.9 FL (ref 80–100)
MONOCYTES NFR BLD: 4 %
MPV: 7.9 FL. (ref 7.2–11.1)
NEUTROPHILS # BLD: 12.9 THOU/UL (ref 1.6–8.1)
NT-PRO BRAIN NAT PEPTIDE: 7991 PG/ML (ref ?–300)
NUCLEATED RBCS: 0 /100WBC
PLATELET # BLD EST: ADEQUATE 10*3/UL
PLATELET COUNT*: 205 THOU/UL (ref 150–400)
POIKILOCYTOSIS BLD QL SMEAR: (no result)
POTASSIUM SERPL-SCNC: 3.9 MMOL/L (ref 3.5–5.1)
PROT SERPL-MCNC: 7.6 G/DL (ref 6.4–8.2)
PROTHROMBIN TIME: 28.1 SECONDS (ref 9.2–11.5)
RBC # BLD AUTO: 4.28 MIL/UL (ref 4.5–6)
RDW-CV: 16 % (ref 10.5–14.5)
SODIUM SERPL-SCNC: 137 MMOL/L (ref 136–145)
WBC # BLD AUTO: 14.8 THOU/UL (ref 4–11)

## 2018-07-23 NOTE — EKG
[place] [address]
Phone:  [phone]                     ELECTROCARDIOGRAM REPORT      
_______________________________________________________________________________
 
Name:       YAKOV RODRIGUEZ             Room:           [address] [place]  
R.#:  [id number]      Account #:      [id number]  
Admission:  18     Attend Phys:    Fernando Abel MD 
Discharge:               Date of Birth:  [birth year]  
         Report #: [id number]
    [id number]
_______________________________________________________________________________
THIS REPORT FOR:  //name//                      
 
                         [facility] ED
                                       
Test Date:    2018               Test Time:    13:17:44
Pat Name:     YAKOV RODRIGUEZ         Department:   
Patient ID:   [id number]            Room:          
Gender:                               Technician:   [facility]
:          1933               Requested By: Dave Mcmullen
Order Number: [id number] MD:   Ronnie Jenkins
                                 Measurements
Intervals                              Axis          
Rate:         60                       P:            -69
DC:           285                      QRS:          -78
QRSD:         215                      T:            111
QT:           505                                    
QTc:          505                                    
                           Interpretive Statements
Ventricular-paced rhythm
No further analysis attempted due to paced rhythm
Compared to ECG 2018 01:45:39
No significant changes
 
Electronically Signed On 2018 19:37:07 CDT by Ronnie Jenkins
[email or web address]
 
 
 
 
 
 
 
 
 
 
 
 
 
 
 
 
 
 
  <ELECTRONICALLY SIGNED>
                                           By: Ronnie Jenkins MD, FACC   
  18     1937
D: 18 1317   _____________________________________
T: 18 1317   Ronnie Jenkins MD, FAC     /EPI

## 2018-07-23 NOTE — NUR
PATIENT PROGRESSING WELL TOWARDS GOALS. AXOX4, NO PAIN, NAUSEA OR SHORTNESS OF
AIR REPORTED. GLUCOSE REMAINED UNDER CONTROL, VITALS WNL. BED IN LOWEST
POSITION, CALL LIGHT IN REACH, FALL PREACAUTIONS IN PLACE.

## 2018-07-24 VITALS — SYSTOLIC BLOOD PRESSURE: 136 MMHG | DIASTOLIC BLOOD PRESSURE: 64 MMHG

## 2018-07-24 VITALS — DIASTOLIC BLOOD PRESSURE: 52 MMHG | SYSTOLIC BLOOD PRESSURE: 115 MMHG

## 2018-07-24 VITALS — SYSTOLIC BLOOD PRESSURE: 115 MMHG | DIASTOLIC BLOOD PRESSURE: 57 MMHG

## 2018-07-24 LAB
ABSOLUTE BASOPHILS: 0 THOU/UL (ref 0–0.2)
ABSOLUTE EOSINOPHILS: 0.3 THOU/UL (ref 0–0.7)
ABSOLUTE MONOCYTES: 1.3 THOU/UL (ref 0–1.2)
ANION GAP SERPL CALC-SCNC: 8 MMOL/L (ref 7–16)
BASOPHILS NFR BLD AUTO: 0.3 %
BUN SERPL-MCNC: 31 MG/DL (ref 7–18)
CALCIUM SERPL-MCNC: 8.6 MG/DL (ref 8.5–10.1)
CHLORIDE SERPL-SCNC: 104 MMOL/L (ref 98–107)
CO2 SERPL-SCNC: 27 MMOL/L (ref 21–32)
CREAT SERPL-MCNC: 1.4 MG/DL (ref 0.6–1.3)
EOSINOPHIL NFR BLD: 2.9 %
GLUCOSE SERPL-MCNC: 108 MG/DL (ref 70–99)
GRANULOCYTES NFR BLD MANUAL: 76 %
HCT VFR BLD CALC: 34.6 % (ref 42–52)
HGB BLD-MCNC: 11.3 GM/DL (ref 14–18)
INR PPP: 2.6
LYMPHOCYTES # BLD: 1 THOU/UL (ref 0.8–5.3)
LYMPHOCYTES NFR BLD AUTO: 9 %
MCH RBC QN AUTO: 30.3 PG (ref 26–34)
MCHC RBC AUTO-ENTMCNC: 32.7 G/DL (ref 28–37)
MCV RBC: 92.7 FL (ref 80–100)
MONOCYTES NFR BLD: 11.8 %
MPV: 8.1 FL. (ref 7.2–11.1)
NEUTROPHILS # BLD: 8.3 THOU/UL (ref 1.6–8.1)
NUCLEATED RBCS: 0 /100WBC
PLATELET COUNT*: 158 THOU/UL (ref 150–400)
POTASSIUM SERPL-SCNC: 3.8 MMOL/L (ref 3.5–5.1)
PROTHROMBIN TIME: 24.8 SECONDS (ref 9.2–11.5)
RBC # BLD AUTO: 3.73 MIL/UL (ref 4.5–6)
RDW-CV: 15.7 % (ref 10.5–14.5)
SODIUM SERPL-SCNC: 139 MMOL/L (ref 136–145)
WBC # BLD AUTO: 10.9 THOU/UL (ref 4–11)

## 2018-07-24 NOTE — NUR
MET WITH PT AND DTR TO DISCUSS HOME SITUATION/DC PLANNING. PT KNOWN TO CM.  HE
LIVES WITH WIFE.  DTR AND SON ARE SUPPORTIVE AND ASSIST WITH CARES.  PT GOES
TO [facility]  FOR [facility] AND WOUND CARE.  HE HAS HAD  A WOUND VAC ON
HIS LEG UNTIL A WEEK AGO.  VASCULAR FOLLOWS HIM.  PT USES WALKER AND HAS CANE
AND GRAB BARS. HE HAD HH WITH [facility] UNTIL HE STARTED [facility].  HE PLANS TO RETURN
HOME [place].  WIFE NOT IN ROOM AT THIS TIME.  WILL F/U WITH HER AND DISCUSS
FURTHER

## 2018-07-24 NOTE — NUR
PATIENT UP TO BATHROOM WITH USE OF GAIT BELT AND WALKER, PATIENT HEAVY ASSIST.
BM NOTED THIS AFTERNOON. IV REMAINS SL, SCHED ABX INFUSED. DR. CROCKETT
CONSULTED, HE AND DR. GAMEZ NOTIFIED OF GRAM + COCCI IN BLOOD CULTURE.
VASCULAR HERE THIS AFTERNOON, NO NEW ORDERS RECEIVED. DRESSINGS TO RIGHT FOOT
CHANGED PER PROTOCOL. LEFT TUBAGRIP REMOVED AND SKIN CHECKED. NO COMPLAINTS OF
PAIN. FAMILY AT BEDSIDE ALL SHIFT.

## 2018-07-24 NOTE — NUR
PT A&O GETS UP WITH STAND BY ASSIST. L FOOT LEG WITH TUBA . R FOOT DRSG
INTACT. ON RA. ABX INFUSING TO L FA.

## 2018-07-25 VITALS — DIASTOLIC BLOOD PRESSURE: 71 MMHG | SYSTOLIC BLOOD PRESSURE: 141 MMHG

## 2018-07-25 VITALS — DIASTOLIC BLOOD PRESSURE: 72 MMHG | SYSTOLIC BLOOD PRESSURE: 150 MMHG

## 2018-07-25 VITALS — SYSTOLIC BLOOD PRESSURE: 149 MMHG | DIASTOLIC BLOOD PRESSURE: 76 MMHG

## 2018-07-25 LAB
ABSOLUTE EOSINOPHILS: 0.4 THOU/UL (ref 0–0.7)
ABSOLUTE MONOCYTES: 1.7 THOU/UL (ref 0–1.2)
ALBUMIN SERPL-MCNC: 2.5 G/DL (ref 3.4–5)
ALP SERPL-CCNC: 70 U/L (ref 46–116)
ALT SERPL-CCNC: 21 U/L (ref 30–65)
ANION GAP SERPL CALC-SCNC: 8 MMOL/L (ref 7–16)
AST SERPL-CCNC: 31 U/L (ref 15–37)
BILIRUB SERPL-MCNC: 0.7 MG/DL
BUN SERPL-MCNC: 34 MG/DL (ref 7–18)
CALCIUM SERPL-MCNC: 8.6 MG/DL (ref 8.5–10.1)
CHLORIDE SERPL-SCNC: 101 MMOL/L (ref 98–107)
CO2 SERPL-SCNC: 26 MMOL/L (ref 21–32)
CREAT SERPL-MCNC: 1.6 MG/DL (ref 0.6–1.3)
EOSINOPHIL NFR BLD: 4 %
GLUCOSE SERPL-MCNC: 135 MG/DL (ref 70–99)
GRANULOCYTES NFR BLD MANUAL: 73 %
HCT VFR BLD CALC: 36.1 % (ref 42–52)
HGB BLD-MCNC: 11.9 GM/DL (ref 14–18)
INR PPP: 3
LYMPHOCYTES # BLD: 0.8 THOU/UL (ref 0.8–5.3)
LYMPHOCYTES NFR BLD AUTO: 7 %
MCH RBC QN AUTO: 30.8 PG (ref 26–34)
MCHC RBC AUTO-ENTMCNC: 33.1 G/DL (ref 28–37)
MCV RBC: 93.1 FL (ref 80–100)
MONOCYTES NFR BLD: 16 %
MPV: 8.1 FL. (ref 7.2–11.1)
NEUTROPHILS # BLD: 8 THOU/UL (ref 1.6–8.1)
NUCLEATED RBCS: 0 /100WBC
PLATELET # BLD EST: ADEQUATE 10*3/UL
PLATELET COUNT*: 196 THOU/UL (ref 150–400)
POTASSIUM SERPL-SCNC: 3.8 MMOL/L (ref 3.5–5.1)
PROT SERPL-MCNC: 5.9 G/DL (ref 6.4–8.2)
PROTHROMBIN TIME: 28.3 SECONDS (ref 9.2–11.5)
RBC # BLD AUTO: 3.88 MIL/UL (ref 4.5–6)
RBC MORPH BLD: NORMAL
RDW-CV: 15.9 % (ref 10.5–14.5)
SODIUM SERPL-SCNC: 135 MMOL/L (ref 136–145)
WBC # BLD AUTO: 10.9 THOU/UL (ref 4–11)

## 2018-07-25 NOTE — NUR
PT SLEPT ON AND OFF THIS SHIFT. ASSESSMENT AS DOCUMENTED. MEDS GIVEN PER
E-MAR. IV PATENT. PT REFUSED TO TAKE HIS TAMSULOSIN THIS SHIFT. NO REPORTS OF
PAIN. WILL CONTINUE WITH PLAN OF CARE.

## 2018-07-25 NOTE — CON
[address]  [address]                    CONSULTATION                  
_______________________________________________________________________________
 
Name:       JENNIFER,YAKOV ELYSIA             Room:           [address] [place]  
.R.#:  [id number]      Account #:      [id number]  
Admission:  07/23/18     Attend Phys:    Fernando Abel MD 
Discharge:               Date of Birth:  [birth year]  
         Report #: [id number]
                                                                     [id number]  
_______________________________________________________________________________
THIS REPORT FOR:  //name//                      
 
CC: Fernando Abel
    Sergio Landin
 
DATE OF SERVICE:  07/24/2018
 
 
INFECTIOUS DISEASE CONSULTATION
 
ATTENDING PHYSICIAN:  Fernando Abel M.D.
 
REASON FOR EVALUATION:  Right lower extremity skin and soft tissue
infection/cellulitis.
 
HISTORY OF PRESENT ILLNESS:  Chart reviewed, patient examined.  This is an
85-year-old man well known to myself, it has been several months since he was
last seen, was admitted through the emergency room with complaints of increasing
inflammatory signs associated with the right lower extremity.  It is notable
that he has severe vasculopathy generally and throughout the lower extremities,
previous toe amputations which were prior to complication with diabetes
mellitus.  He was reportedly quite encephalopathic as well.  It is not clear if
he had any fevers or chills.  At this point, he denies any anorexia.  No
gastrointestinal-related complaints.  He has had some dyspnea with mild
exertion.  As part of the evaluation, blood cultures were collected at the time
of admission 1/2, now with growth of Gram-positive cocci, awaiting ID and
susceptibility, was empirically treated with antimicrobials, vancomycin and
given a dose of ciprofloxacin as well.  Clearly, he has improved.
 
ALLERGIES:  LATEX, ACE INHIBITORS, PENICILLIN.
 
CURRENT MEDICATIONS:  Include cyanocobalamin, warfarin, aspirin, fish oil,
furosemide, metformin, atorvastatin, ascorbic acid, metoprolol, finasteride,
fenofibrate, pantoprazole, vancomycin, tamsulosin, p.r.n. analgesics,
antiemetics.
 
PAST MEDICAL HISTORY:  As described above, the diabetes mellitus type 2
complicated by severe vasculopathy, has known coronary artery disease,
peripheral vascular disease, has cardiomyopathy with history of congestive heart
failure, aortic valve replacement, has had previous lower extremity bypass
grafting, hyperlipidemia, left toe amputation, right great toe and second toe
amputations.
 
SOCIAL HISTORY:  Former smoker.  No ethanol.
 
FAMILY HISTORY:  Noncontributory.
 
 
 
[place] [address]                    CONSULTATION                  
_______________________________________________________________________________
 
Name:       YAKOV RODRIGUEZ             Room:           [address]#:  [id number]      Account #:      [id number]  
Admission:  07/23/18     Attend Phys:    Fernando Abel MD 
Discharge:               Date of Birth:  [birth year]  
         Report #: [id number]
                                                                     [id number]  
_______________________________________________________________________________
 
REVIEW OF SYSTEMS:  As above.
 
PHYSICAL EXAMINATION:
GENERAL:  He is pleasant, alert, cooperative, appears chronically ill,
undernourished.
VITAL SIGNS:  Temperature max 99.3, more recently 97.9, pulse 61, respirations
18, blood pressure 136/64.
SKIN:  Warm, dry, no rashes.
HEENT:  Otherwise, unremarkable.  He has nasal cannula oxygen in place.
NECK:  Supple.
LUNGS:  Occasional wheezes, few scattered coarse breath sounds.  He is
borderline tachypneic.
ABDOMEN:  Soft, nontender, nondistended.
EXTREMITIES:  Right lower extremity has moderate inflammatory changes.  There is
a change consistent with vasculopathy.  At this point, there is no significant
amount of swelling.  Distal pulses are markedly diminished, scattered blackened
area over his toe.
GENITOURINARY:  Deferred.
RECTAL:  Deferred.
 
LABORATORY DATA:  Ultrasound of aorta shows no aneurysm or stenosis.  Blood
cultures as described above, 1/2 with Gram-positive cocci.  Electrolytes: 
Sodium 139, potassium 3.8, chloride 104, bicarbonate is 27, BUN and creatinine
31 and 1.4, glucose of 108, estimated GFR of 48.  CBC:  White count of 10.9, H
and H is 11.3 and 34.6, platelets of 158.  Arterial Doppler of lower extremity
showed occlusion of the popliteal artery with a bypass graft from the groin to
the calf.  Sed rate was 95.  White count of 14.8, H and H 12.8 and 39.3,
platelets of 205.  An x-ray of the foot, no definite evidence for bone
destruction or acute osseous abnormality.  Chest x-ray, no acute cardiopulmonary
abnormality.  Electrolytes:  Sodium 137, potassium 3.9, chloride 102,
bicarbonate is 30, anion gap of 5, BUN and creatinine 32 and 1.5, glucose of
156.  Albumin of 2.9, total protein of 7.6.  LFTs unremarkable.  Lactic acid
1.9.  PT of 28.1, INR of 2.9.
 
ASSESSMENT:  Right lower extremity inflammatory eruption, likely multifactorial
with a component of skin and soft tissue infection/cellulitis.  We will continue
the vancomycin.  He does have a positive blood culture; at this point with the
information available, I cannot tell you if it is true positive or contaminant,
we will await those results.  Overall, he is better clinically.  I discussed
with the patient and the family.  Pending discussion with Vascular Surgery,
whether they are to do watchful waiting or intervene is not clear.
 
 
<ELECTRONICALLY SIGNED>
                                        By:  Inder Garcia MD           
07/25/18     1153
D: 07/24/18 1150_______________________________________
T: 07/24/18 1223Jotriston Garcia MD              /nt

## 2018-07-25 NOTE — NUR
PATIENT SITTING UP IN CHAIR ALL MORNING/AFTERNOON, WAFFLE CUSHION IN PLACE AND
REPOSITIONED IN CHAIR. PT AMBULATED WITH PATIENT TODAY. VANC TROUGH 11 THIS
AFTERNOON, IV PIGGYBACK DOSE INCREASED PER PHARMACY. DRESSINGS TO RIGHT
LEG/FOOT CHANGED THIS SHIFT PER PROTOCOL. PATIENT TO DISCHARGE WHEN BLOOD
CULTURE SENSITIVITY RESULTS AND ABX ORDERED PER DR. CROCKETT FROM ID. FAMILY AT
BEDSIDE THIS SHIFT AND UPDATED ON PLAN OF CARE.

## 2018-07-26 VITALS — DIASTOLIC BLOOD PRESSURE: 61 MMHG | SYSTOLIC BLOOD PRESSURE: 120 MMHG

## 2018-07-26 VITALS — SYSTOLIC BLOOD PRESSURE: 117 MMHG | DIASTOLIC BLOOD PRESSURE: 69 MMHG

## 2018-07-26 VITALS — SYSTOLIC BLOOD PRESSURE: 108 MMHG | DIASTOLIC BLOOD PRESSURE: 52 MMHG

## 2018-07-26 LAB
ABSOLUTE BASOPHILS: 0.1 THOU/UL (ref 0–0.2)
ABSOLUTE EOSINOPHILS: 0.4 THOU/UL (ref 0–0.7)
ABSOLUTE MONOCYTES: 1.4 THOU/UL (ref 0–1.2)
ALBUMIN SERPL-MCNC: 2.2 G/DL (ref 3.4–5)
ALP SERPL-CCNC: 71 U/L (ref 46–116)
ALT SERPL-CCNC: 21 U/L (ref 30–65)
ANION GAP SERPL CALC-SCNC: 8 MMOL/L (ref 7–16)
AST SERPL-CCNC: 28 U/L (ref 15–37)
BASOPHILS NFR BLD AUTO: 0.7 %
BILIRUB SERPL-MCNC: 0.8 MG/DL
BUN SERPL-MCNC: 45 MG/DL (ref 7–18)
CALCIUM SERPL-MCNC: 8.8 MG/DL (ref 8.5–10.1)
CHLORIDE SERPL-SCNC: 101 MMOL/L (ref 98–107)
CO2 SERPL-SCNC: 27 MMOL/L (ref 21–32)
CREAT SERPL-MCNC: 2.1 MG/DL (ref 0.6–1.3)
EOSINOPHIL NFR BLD: 3.5 %
GLUCOSE SERPL-MCNC: 104 MG/DL (ref 70–99)
GRANULOCYTES NFR BLD MANUAL: 74.6 %
HCT VFR BLD CALC: 35.9 % (ref 42–52)
HGB BLD-MCNC: 11.8 GM/DL (ref 14–18)
INR PPP: 3
LYMPHOCYTES # BLD: 0.9 THOU/UL (ref 0.8–5.3)
LYMPHOCYTES NFR BLD AUTO: 8.3 %
MCH RBC QN AUTO: 30 PG (ref 26–34)
MCHC RBC AUTO-ENTMCNC: 32.8 G/DL (ref 28–37)
MCV RBC: 91.3 FL (ref 80–100)
MONOCYTES NFR BLD: 12.9 %
MPV: 7.5 FL. (ref 7.2–11.1)
NEUTROPHILS # BLD: 8.1 THOU/UL (ref 1.6–8.1)
NUCLEATED RBCS: 0 /100WBC
PLATELET COUNT*: 192 THOU/UL (ref 150–400)
POTASSIUM SERPL-SCNC: 4 MMOL/L (ref 3.5–5.1)
PREALB SERPL-MCNC: 9.1 MG/DL (ref 18–35.7)
PROT SERPL-MCNC: 6.4 G/DL (ref 6.4–8.2)
PROTHROMBIN TIME: 28.8 SECONDS (ref 9.2–11.5)
RBC # BLD AUTO: 3.93 MIL/UL (ref 4.5–6)
RDW-CV: 16.2 % (ref 10.5–14.5)
SODIUM SERPL-SCNC: 136 MMOL/L (ref 136–145)
WBC # BLD AUTO: 10.8 THOU/UL (ref 4–11)

## 2018-07-26 NOTE — NUR
PT STABLE AT SHIFT CHANGE. REPORT TO NIGHT SHIFT FOR CONTINUED CARES. PT IN
RECLINER WITH FEET FLOATED ON PILLOW AND BOOTS ON FOR WOUND PROTECTION. Q2
TURNS, ACCU CHECKS AND HOURLY ROUNDING COMPLETED. MEALS WELL TOLERATED AND
CONSUMED. PT PROGRESSING TOWARDS GOAL.

## 2018-07-26 NOTE — NUR
ASSUMED CARES OF PT AT 0700. PT IN BED, BED IN LOW LOCKED POSITION. BED ALARM
ON FOR SAFETY. CALL BUTTON AND PERSONAL ITEMS IN PT REACH. FAMILY AT BEDSIDE.
PT A&O X4, COOERATIVE AND CALM THIS MORNING ROUNDS. ONE OF PT HEARING AIDS
LOST, ROOM, BED, CHAIR, TRASH CANS, DRAWERS CHECKED AND NOT FOUND AT THIS
TIME. WILL CONTINUE TO SEARCH FOR HEARING AID. HRRR PER AUSCULTATION,
LCTAB/DIMINISHED LL BILATERALLY. VSS ON RA, AFEBRILE, PERRLA, SCATTERED
BRUISING, SCARS, FOOT WOUNDS, REDNESS IN LE BILATERALLY. Q2 TURNS AND HOURLY
ROUNDING CONTINUE. PT UP 1-2 ASSIST WITH GAIT BELT AND WALKER, PT VERY WEAK.
SCD'S CONTRAINDICATED. IV IN LEFT FA LEAKING, PULLED OUT AND REPLACED IN RIGHT
FA 22 GAUGE, FLUIDS RESTARTED AT 50 ML/HR. ACCU CHECKS CONTINUE WITH PO
CONTROL. PT DENIES PAIN THIS SHIFT AT THIS TIME. INFECTIOUS DISEASE AND
VASCULAR CONSULTED. PT PROGRESSING TOWARDS GOAL. PT TOOK MEDS WELL PO THIS
MORNING MED PASS. WILL CONTINUE TO MONITOR PT STATUS AND PROGRESS. PT DID
REPORT SOME DISCOMFORT IN RIGHT SHOULDER DUE TO REPOSITIONING AND PUSHING WITH
RIGHT ARM. WARM MOIST COMPRESS APPLIED. PT SATISFIED PER PT STATEMENT.

## 2018-07-26 NOTE — NUR
PT SLEPT MOST OF SHIFT. ASSESSMENT AS DOCUMENTED. PT REFUSED PO MEDICATIONS
THIS SHIFT, ONCE STATING THAT HE NEEDED TO TALK TO THE DR FIRST THEN STATING
HIS WIFE NEEDED TO BE HERE FIRST. NO REPORTS OF PAIN. PT AMBULATED TO BATHROOM
ONCE THIS SHIFT. WILL CONTINUE WITH PLAN OF CARE.

## 2018-07-26 NOTE — NUR
MET WITH PT'S SON/INDIGO TO DISCUSS DC PLAN. PER DR CROCKETT, TO SWITCH TO PO
ANTIBX.  PER WIFE, PLAN IS FOR PT TO RETURN HOME AT DC AND CONTINUE WITH DAILY
HBO AT [facility].  THEY FEEL THEY CAN CONTINUE TO MANAGE AT HOME.  WILL
FOLLOW

## 2018-07-27 VITALS — SYSTOLIC BLOOD PRESSURE: 132 MMHG | DIASTOLIC BLOOD PRESSURE: 71 MMHG

## 2018-07-27 VITALS — SYSTOLIC BLOOD PRESSURE: 110 MMHG | DIASTOLIC BLOOD PRESSURE: 53 MMHG

## 2018-07-27 LAB
ABSOLUTE EOSINOPHILS: 0.2 THOU/UL (ref 0–0.7)
ABSOLUTE MONOCYTES: 0.2 THOU/UL (ref 0–1.2)
ALBUMIN SERPL-MCNC: 2.1 G/DL (ref 3.4–5)
ALP SERPL-CCNC: 76 U/L (ref 46–116)
ALT SERPL-CCNC: 27 U/L (ref 30–65)
ANION GAP SERPL CALC-SCNC: 8 MMOL/L (ref 7–16)
ANISOCYTOSIS BLD QL SMEAR: (no result)
AST SERPL-CCNC: 35 U/L (ref 15–37)
BILIRUB SERPL-MCNC: 0.6 MG/DL
BUN SERPL-MCNC: 61 MG/DL (ref 7–18)
CALCIUM SERPL-MCNC: 8.2 MG/DL (ref 8.5–10.1)
CHLORIDE SERPL-SCNC: 101 MMOL/L (ref 98–107)
CO2 SERPL-SCNC: 26 MMOL/L (ref 21–32)
CREAT SERPL-MCNC: 3.6 MG/DL (ref 0.6–1.3)
EOSINOPHIL NFR BLD: 2 %
GLUCOSE SERPL-MCNC: 120 MG/DL (ref 70–99)
GRANULOCYTES NFR BLD MANUAL: 81 %
HCT VFR BLD CALC: 35.4 % (ref 42–52)
HGB BLD-MCNC: 11.5 GM/DL (ref 14–18)
INR PPP: 3.7
LYMPHOCYTES # BLD: 1.6 THOU/UL (ref 0.8–5.3)
LYMPHOCYTES NFR BLD AUTO: 14 %
MCH RBC QN AUTO: 29.7 PG (ref 26–34)
MCHC RBC AUTO-ENTMCNC: 32.4 G/DL (ref 28–37)
MCV RBC: 91.6 FL (ref 80–100)
MONOCYTES NFR BLD: 2 %
MPV: 7.7 FL. (ref 7.2–11.1)
NEUTROPHILS # BLD: 9.2 THOU/UL (ref 1.6–8.1)
NEUTS BAND NFR BLD: 1 %
NUCLEATED RBCS: 0 /100WBC
OVALOCYTES BLD QL SMEAR: (no result)
PLATELET # BLD EST: ADEQUATE 10*3/UL
PLATELET COUNT*: 182 THOU/UL (ref 150–400)
POIKILOCYTOSIS BLD QL SMEAR: (no result)
POTASSIUM SERPL-SCNC: 4.3 MMOL/L (ref 3.5–5.1)
PREALB SERPL-MCNC: 9 MG/DL (ref 18–35.7)
PROT SERPL-MCNC: 6.2 G/DL (ref 6.4–8.2)
PROTHROMBIN TIME: 35 SECONDS (ref 9.2–11.5)
RBC # BLD AUTO: 3.86 MIL/UL (ref 4.5–6)
RDW-CV: 16.5 % (ref 10.5–14.5)
SODIUM SERPL-SCNC: 135 MMOL/L (ref 136–145)
WBC # BLD AUTO: 11.2 THOU/UL (ref 4–11)

## 2018-07-27 NOTE — NUR
PT SLEPT FAIRLY WELL THIS SHIFT. UP IN CHAIR AT START OF SHIFT, BACK TO BED
WITH 2PERSON ASSIST, GB, WALKER. DRSG CHANGED TO BOTTOM R FOOT AT HS, CDI TO R
DIEGO. HS ACCUCHECK 184. USING URINAL TO VOID WITH SOME ASSIST OVERNIGHT. AM
LABS REFUSED, WANTS TO WAIT UNTIL WIFE ARRIVES. DEPENDS ON WIFE FOR
CONFIRMATION OF MEDS AND ACTIVITIES IT SEEMS, REFUSED SOME MEDS WANTING TO
WAIT AND CHECK WITH WIFE. RFA IVF INFUSING PER PUMP. VOLTARAN GEL APPLIED TO R
SHOULDER AT HS WITH GOOD RESULT. TURNED AND REPOSITIONED Q2 HOURS AND PRN FOR
SKIN CARE AND COMFORT.

## 2018-07-28 VITALS — SYSTOLIC BLOOD PRESSURE: 129 MMHG | DIASTOLIC BLOOD PRESSURE: 69 MMHG

## 2018-07-28 VITALS — SYSTOLIC BLOOD PRESSURE: 125 MMHG | DIASTOLIC BLOOD PRESSURE: 64 MMHG

## 2018-07-28 VITALS — DIASTOLIC BLOOD PRESSURE: 68 MMHG | SYSTOLIC BLOOD PRESSURE: 112 MMHG

## 2018-07-28 VITALS — SYSTOLIC BLOOD PRESSURE: 124 MMHG | DIASTOLIC BLOOD PRESSURE: 58 MMHG

## 2018-07-28 LAB
ABSOLUTE BASOPHILS: 0 THOU/UL (ref 0–0.2)
ABSOLUTE EOSINOPHILS: 0.1 THOU/UL (ref 0–0.7)
ABSOLUTE MONOCYTES: 1.2 THOU/UL (ref 0–1.2)
ALBUMIN SERPL-MCNC: 2 G/DL (ref 3.4–5)
ALP SERPL-CCNC: 72 U/L (ref 46–116)
ALT SERPL-CCNC: 24 U/L (ref 30–65)
ANION GAP SERPL CALC-SCNC: 10 MMOL/L (ref 7–16)
AST SERPL-CCNC: 31 U/L (ref 15–37)
BASOPHILS NFR BLD AUTO: 0.2 %
BILIRUB SERPL-MCNC: 0.6 MG/DL
BUN SERPL-MCNC: 71 MG/DL (ref 7–18)
CALCIUM SERPL-MCNC: 7.6 MG/DL (ref 8.5–10.1)
CHLORIDE SERPL-SCNC: 99 MMOL/L (ref 98–107)
CO2 SERPL-SCNC: 22 MMOL/L (ref 21–32)
CREAT SERPL-MCNC: 4.6 MG/DL (ref 0.6–1.3)
EOSINOPHIL NFR BLD: 0.7 %
GLUCOSE SERPL-MCNC: 101 MG/DL (ref 70–99)
GRANULOCYTES NFR BLD MANUAL: 84.3 %
HCT VFR BLD CALC: 33.3 % (ref 42–52)
HGB BLD-MCNC: 10.8 GM/DL (ref 14–18)
INR PPP: 4.8
LYMPHOCYTES # BLD: 0.7 THOU/UL (ref 0.8–5.3)
LYMPHOCYTES NFR BLD AUTO: 5.4 %
MAGNESIUM SERPL-MCNC: 1.8 MG/DL (ref 1.8–2.4)
MCH RBC QN AUTO: 29.7 PG (ref 26–34)
MCHC RBC AUTO-ENTMCNC: 32.4 G/DL (ref 28–37)
MCV RBC: 91.5 FL (ref 80–100)
MONOCYTES NFR BLD: 9.4 %
MPV: 7.2 FL. (ref 7.2–11.1)
NEUTROPHILS # BLD: 11 THOU/UL (ref 1.6–8.1)
NUCLEATED RBCS: 0 /100WBC
PHOSPHATE SERPL-MCNC: 5.2 MG/DL (ref 2.5–4.9)
PLATELET COUNT*: 183 THOU/UL (ref 150–400)
POTASSIUM SERPL-SCNC: 4.6 MMOL/L (ref 3.5–5.1)
PREALB SERPL-MCNC: 8.7 MG/DL (ref 18–35.7)
PROT SERPL-MCNC: 5.3 G/DL (ref 6.4–8.2)
PROTHROMBIN TIME: 45.7 SECONDS (ref 9.2–11.5)
RBC # BLD AUTO: 3.64 MIL/UL (ref 4.5–6)
RDW-CV: 16.3 % (ref 10.5–14.5)
SODIUM SERPL-SCNC: 131 MMOL/L (ref 136–145)
WBC # BLD AUTO: 13 THOU/UL (ref 4–11)

## 2018-07-28 NOTE — NUR
PATIENT SLEPT MOST OF THE NIGHT. IV FLUIDS CONTINUE TO INFUSE  ML/HR.
VAUGHN CATH WAS PLACED AT BEGINNING OF SHIFT PER DOCTOR ORDERS FOR CRITICAL I
AND O'S. PATIENT HAS ONLY HAD 50 ML OF BLOODY URINE DR. GAMEZ NOTIFIED
ORDER FOR NORMAL SALINE BOLUS RECEIVED. PATIENT HAS A CRITICAL INR OF 4.8
COUMADIN WAS DISCONTINUED PER  ORDER. WILL CONTINUE TO MONITOR.

## 2018-07-28 NOTE — NUR
PATIENT HAS BEEN ALERT AND ORIENTED TODAY, SOMEWHAT UNCOOPERATIVE AT TIMES.
VERY LITTLE URINE OUTPUT TODAY IM VAUGHN. VITAL SIGNS STABLE ON ROOM AIR. NO
COMPLAINTS OF ANY PAIN TODAY. FAMILY HAS BEEN AT BEDSIDE TODAY. APPEITE IS
GOOD. CALL LIGHT IS IN REACH, WILL CONTINUE TO MONITOR.

## 2018-07-29 VITALS — SYSTOLIC BLOOD PRESSURE: 121 MMHG | DIASTOLIC BLOOD PRESSURE: 63 MMHG

## 2018-07-29 VITALS — DIASTOLIC BLOOD PRESSURE: 64 MMHG | SYSTOLIC BLOOD PRESSURE: 103 MMHG

## 2018-07-29 VITALS — DIASTOLIC BLOOD PRESSURE: 57 MMHG | SYSTOLIC BLOOD PRESSURE: 108 MMHG

## 2018-07-29 VITALS — DIASTOLIC BLOOD PRESSURE: 74 MMHG | SYSTOLIC BLOOD PRESSURE: 119 MMHG

## 2018-07-29 VITALS — DIASTOLIC BLOOD PRESSURE: 73 MMHG | SYSTOLIC BLOOD PRESSURE: 136 MMHG

## 2018-07-29 VITALS — SYSTOLIC BLOOD PRESSURE: 134 MMHG | DIASTOLIC BLOOD PRESSURE: 74 MMHG

## 2018-07-29 VITALS — DIASTOLIC BLOOD PRESSURE: 53 MMHG | SYSTOLIC BLOOD PRESSURE: 105 MMHG

## 2018-07-29 VITALS — SYSTOLIC BLOOD PRESSURE: 112 MMHG | DIASTOLIC BLOOD PRESSURE: 61 MMHG

## 2018-07-29 LAB
ABSOLUTE BASOPHILS: 0 THOU/UL (ref 0–0.2)
ABSOLUTE EOSINOPHILS: 0.2 THOU/UL (ref 0–0.7)
ABSOLUTE MONOCYTES: 0.6 THOU/UL (ref 0–1.2)
ALBUMIN SERPL-MCNC: 2.7 G/DL (ref 3.4–5)
ALP SERPL-CCNC: 67 U/L (ref 46–116)
ALT SERPL-CCNC: 19 U/L (ref 30–65)
ANION GAP SERPL CALC-SCNC: 11 MMOL/L (ref 7–16)
AST SERPL-CCNC: 22 U/L (ref 15–37)
BASOPHILS NFR BLD AUTO: 0.3 %
BILIRUB SERPL-MCNC: 0.5 MG/DL
BUN SERPL-MCNC: 82 MG/DL (ref 7–18)
CALCIUM SERPL-MCNC: 8.3 MG/DL (ref 8.5–10.1)
CHLORIDE SERPL-SCNC: 98 MMOL/L (ref 98–107)
CO2 SERPL-SCNC: 20 MMOL/L (ref 21–32)
CREAT SERPL-MCNC: 5.4 MG/DL (ref 0.6–1.3)
EOSINOPHIL NFR BLD: 2.6 %
GLUCOSE SERPL-MCNC: 116 MG/DL (ref 70–99)
GRANULOCYTES NFR BLD MANUAL: 79.2 %
HCT VFR BLD CALC: 29.9 % (ref 42–52)
HGB BLD-MCNC: 9.9 GM/DL (ref 14–18)
INR PPP: 3.2
INR PPP: 7.2
LYMPHOCYTES # BLD: 0.6 THOU/UL (ref 0.8–5.3)
LYMPHOCYTES NFR BLD AUTO: 9.1 %
MCH RBC QN AUTO: 30 PG (ref 26–34)
MCHC RBC AUTO-ENTMCNC: 32.9 G/DL (ref 28–37)
MCV RBC: 91.2 FL (ref 80–100)
MONOCYTES NFR BLD: 8.8 %
MPV: 7.7 FL. (ref 7.2–11.1)
NEUTROPHILS # BLD: 5.5 THOU/UL (ref 1.6–8.1)
NUCLEATED RBCS: 0 /100WBC
PLATELET COUNT*: 133 THOU/UL (ref 150–400)
POTASSIUM SERPL-SCNC: 4.8 MMOL/L (ref 3.5–5.1)
PROT SERPL-MCNC: 6 G/DL (ref 6.4–8.2)
PROTHROMBIN TIME: 30.3 SECONDS (ref 9.2–11.5)
PROTHROMBIN TIME: 67.7 SECONDS (ref 9.2–11.5)
RBC # BLD AUTO: 3.28 MIL/UL (ref 4.5–6)
RDW-CV: 16.5 % (ref 10.5–14.5)
SODIUM SERPL-SCNC: 129 MMOL/L (ref 136–145)
WBC # BLD AUTO: 7 THOU/UL (ref 4–11)

## 2018-07-29 PROCEDURE — 30233K1 TRANSFUSION OF NONAUTOLOGOUS FROZEN PLASMA INTO PERIPHERAL VEIN, PERCUTANEOUS APPROACH: ICD-10-PCS | Performed by: INTERNAL MEDICINE

## 2018-07-29 NOTE — CON
[address]  [address]                    CONSULTATION                  
_______________________________________________________________________________
 
Name:       YAKOV RODRIGUEZ             Room:           [address] [place]  
.#:  [id number]      Account #:      [id number]  
Admission:  07/23/18     Attend Phys:    Fernando Abel MD 
Discharge:               Date of Birth:  [birth year]  
         Report #: [id number]
                                                                     [id number]  
_______________________________________________________________________________
THIS REPORT FOR:  //name//                      
 
CC: Fernando Landin
 
DATE OF SERVICE:  07/28/2018
 
 
REFERRING PHYSICIAN:  Dr. Abel.
 
REASON FOR CONSULTATION:  Hematuria.
 
HISTORY OF PRESENT ILLNESS:  This is an 85-year-old male admitted with multiple
medical problems.  Urology is consulted regarding hematuria.  Nephrology is
following the patient due to acute renal failure.  Robles catheter was placed for
fluid monitoring despite a postvoid residual of 0 by bladder scan.  Since then,
the patient's wife reports gross hematuria.  They denied prior problems with
hematuria.  Denied prior problems with urinary retention or difficulty emptying
the bladder.  The patient does report he saw Dr. Coon years ago and has been
on Flomax since then for BPH with good control of his voiding symptoms.  That is
managed by Dr. Landin.  The patient denies flank pain or fever.  He is
anticoagulated chronically due to CHF and a prosthetic valve.
 
PAST MEDICAL HISTORY:  As above.  Has a history of diabetes with lower extremity
ulcers, severe vascular disease, coronary artery disease, cardiomyopathy,
congestive heart failure, aortic valve replacement, lower extremity bypass
surgery, hyperlipidemia, toe amputations, hypertension.
 
MEDICATIONS:  List is reviewed.
 
ALLERGIES:  Include LASIX, ACE INHIBITORS and PENICILLIN.
 
SOCIAL HISTORY:  States he quit smoking years ago.  Does not drink alcohol.
 
FAMILY HISTORY:  Noncontributory as they do not know of any family history of
kidney disease.
 
REVIEW OF SYSTEMS:  As per the history of present illness.  The patient denies
chest pain or palpitations.  He has chronic shortness of breath.  Denies
dysuria.
 
PHYSICAL EXAMINATION:
VITAL SIGNS:  Temperature 36.3, pulse 63, respirations 20, blood pressure
125/64.
GENERAL:  This is an 85-year-old male in no acute distress.  He is hard of
hearing, but able to answer some questions.  His wife provides much of the
 
 
 
[place] [address]                    CONSULTATION                  
_______________________________________________________________________________
 
Name:       YAKOV RODRIGUEZ             Room:           [address] [place]  
[facility]#:  [id number]      Account #:      [id number]  
Admission:  07/23/18     Attend Phys:    Fernando Abel MD 
Discharge:               Date of Birth:  [birth year]  
         Report #: [id number]
                                                                     [id number]  
_______________________________________________________________________________
history.
HEENT:  Normocephalic, atraumatic.
NECK:  Supple.
RESPIRATORY:  Effort and excursion are normal.  Chest wall is nontender.
ABDOMEN:  Soft, nontender, nondistended.  Spine and costovertebral angles are
nontender.
GENITOURINARY:  Reveals normal penile and scrotal skin.  There are no testicular
masses.  Urethral meatus is orthotopic.  The patient is uncircumcised with mild
phimosis.  Urethral meatus is orthotopic.  There is a Robles catheter in place
draining a scant amount of bloody urine.  His urine output has been poor.  The
patient is unable to stand or transfer to his bed at this time for rectal exam
which is therefore deferred.
 
LABORATORY STUDIES:  Include hemoglobin 10.8, white count 13.0, platelet count
183,000.  Sodium 131, potassium 4.6, chloride 99, CO2 of 22, BUN 71, creatinine
4.6, glucose 101.  Renal ultrasound was unremarkable.  INR is 4.8.  Urinalysis
is ordered, but has not been sent due to the scant amount of urine.
 
IMPRESSION:  Gross hematuria likely due to instrumentation and anticoagulation. 
We will defer management of his elevated INR to the primary service.  We will
order a noncontrast CT scan (given his renal failure) to evaluate his upper
tracts in some more detail.  Unfortunately, given the degree of anticoagulation,
gross hematuria with instrumentation is not unexpected.  I would defer other
workup depending on results of CT scan and his progress.  We can certainly
reevaluate this after recovery from his acute illness.
 
 
 
 
 
 
 
 
 
 
 
 
 
 
 
 
 
 
 
<ELECTRONICALLY SIGNED>
                                        By:  Fredi Hurley MD             
07/29/18     0848
D: 07/28/18 1410_______________________________________
T: 07/28/18 2006Fredi Hurley MD                /nt

## 2018-07-29 NOTE — NUR
PATIENT ALERT AND ORIENTED TO PLACE AND SELF.  DENIES PAIN OR NAUSEA.  UP WITH
1 ASSIST WALKER AND GAIT BELT.  DRESSINGS CHANGED OVER WOUNDS ON RIGHT LOWER
LEG.   NOTIFIED AROUND 2200 THAT PATIENT HAD NOT HAD ANYTHING OUT IN HIS
CATHETER OTHER THAN A FEW DROPS OF BLOOD.  SHE WAS NOTIFIED OF WHAT THE PT/INR
AND BUN AND CREATININE WERE ON 7/28.  SHE SAID THAT WE DID NOT NEED TO CALL
HER IF THERE WAS NO URINE THROUGHOUT THE NIGHT BUT TO CALL HER IF VITAL SIGNS
BECAME UNSTABLE.  PATIENT RESTED QUIETLY THROUGHOUT NIGHT. ONLY A FEW DROPS
BLOOD NOTED IN CATHETER BAG.  REPOSITIONED EVERY 2 HOURS.  BED ALARM ON.  CALL
LIGHT WITHIN REACH.

## 2018-07-29 NOTE — NUR
ASSUMED CARES OF PT AT 0700. PT IN BED, BED IN LOW LOCKED POSITION. CALL
BUTTON AND PERSONAL ITEMS IN PT REACH. PT A&O X4, COOPERATIVE. PT V-PACED ON
HEART MONITOR. CARDIOLOGY CONSULTS SUBMITTED. VSS ON RA WITH RESPIRATIONS
OFTEN IN THE MID TO UPPER 20'S. HOURLY ROUNDING, ACCU CHECKS AND Q2 TURNS
COMPLETED AS PT WOULD ALLOW Q2 TURNS, SOME TURNS PT REFUSED. 2 UNITS FFP
GRAVITY ADMINISTERED. INR IMPROVED TO 3.2. RENAL BIOPSY SCHEDULED FOR
TOMORROW, PT NPO AFTER 0000. RIGHT FA IV PATENT WITH NS AT 65 ML/HR. CONSULTS
INCLUDE CARDIOLOGY, NEPHROLOGY AND UROLOGY. POSSIBLE TO RECEIVE TEMPORARY
DIALYSIS CATH PER NEPHROLOGY. PT UP TWO ASSIST WITH WALKER AND GAIT BELT TO
BATHROOM. REPORT TO BE GIVEN TO NIGHT SHIFT FOR CONTINUED CARES. WILL CONTINUE
TO MONITOR PT STATUS AND PROGRESS.

## 2018-07-30 VITALS — SYSTOLIC BLOOD PRESSURE: 144 MMHG | DIASTOLIC BLOOD PRESSURE: 76 MMHG

## 2018-07-30 VITALS — SYSTOLIC BLOOD PRESSURE: 126 MMHG | DIASTOLIC BLOOD PRESSURE: 59 MMHG

## 2018-07-30 VITALS — DIASTOLIC BLOOD PRESSURE: 67 MMHG | SYSTOLIC BLOOD PRESSURE: 133 MMHG

## 2018-07-30 VITALS — DIASTOLIC BLOOD PRESSURE: 66 MMHG | SYSTOLIC BLOOD PRESSURE: 128 MMHG

## 2018-07-30 VITALS — DIASTOLIC BLOOD PRESSURE: 91 MMHG | SYSTOLIC BLOOD PRESSURE: 158 MMHG

## 2018-07-30 LAB
ALBUMIN SERPL-MCNC: 2.5 G/DL (ref 3.4–5)
ANION GAP SERPL CALC-SCNC: 12 MMOL/L (ref 7–16)
APTT BLD: 51.9 SECONDS (ref 25–31.3)
BUN SERPL-MCNC: 89 MG/DL (ref 7–18)
CALCIUM SERPL-MCNC: 8.2 MG/DL (ref 8.5–10.1)
CHLORIDE SERPL-SCNC: 97 MMOL/L (ref 98–107)
CO2 SERPL-SCNC: 19 MMOL/L (ref 21–32)
CREAT SERPL-MCNC: 6.2 MG/DL (ref 0.6–1.3)
GLUCOSE SERPL-MCNC: 98 MG/DL (ref 70–99)
HCT VFR BLD CALC: 31.5 % (ref 42–52)
HGB BLD-MCNC: 10.4 GM/DL (ref 14–18)
INR PPP: 3.7
PHOSPHATE SERPL-MCNC: 7 MG/DL (ref 2.5–4.9)
POTASSIUM SERPL-SCNC: 5.4 MMOL/L (ref 3.5–5.1)
PROTHROMBIN TIME: 35.5 SECONDS (ref 9.2–11.5)
SODIUM SERPL-SCNC: 128 MMOL/L (ref 136–145)

## 2018-07-30 PROCEDURE — 5A1D70Z PERFORMANCE OF URINARY FILTRATION, INTERMITTENT, LESS THAN 6 HOURS PER DAY: ICD-10-PCS | Performed by: INTERNAL MEDICINE

## 2018-07-30 NOTE — CON
[address]  [address]                    CONSULTATION                  
_______________________________________________________________________________
 
Name:       LIZET RODRIGUEZDDJUNI NAIDU             Room:           [address] [place]  
.R.#:  [id number]      Account #:      [id number]  
Admission:  07/23/18     Attend Phys:    Fernando Abel MD 
Discharge:               Date of Birth:  [birth year]  
         Report #: [id number]
                                                                     [id number]  
_______________________________________________________________________________
THIS REPORT FOR:  //name//                      
 
CC: Fernando Landin DO
 
DATE OF SERVICE:  07/30/2018
 
 
HISTORY OF PRESENT ILLNESS:  The patient is an 85-year-old  white male
who I was asked to see in the hospital today to manage anticoagulation.  The
patient had previous stents placed in his LAD by Dr. Ramos.  He had previous
TAVR done at  for aortic stenosis in 2015.  He has permanent AFib and has been
chronically anticoagulated.  In 2016, I implanted a single chamber pacemaker for
symptomatic bradycardia.  I actually last saw him in the cardiology clinic in
May when I checked his pacemaker and it was working fine.  He recently developed
a diabetic foot ulcer.  He was found to have evidence of PAD.  He has had
previous bypass surgery in both his right and left legs.  He denies any recent
chest pain, shortness of breath, palpitations, or syncope.  He ambulates with a
walker.  He checks his INRs at home.  He was actually just admitted to the
hospital a week ago.  He was brought to the emergency room with redness of the
right lower extremity.  He has been getting wound treatments, but no longer on
any antibiotics.  He is felt to have cellulitis.  He was noted to have an
elevated INR.  The warfarin was held.  He was given plasma.  He was noted to
have worsening renal function.  A temporary dialysis catheter was placed today. 
He is thought to be in need of a kidney biopsy.  Cardiology consultation was
requested.
 
PAST MEDICAL HISTORY:  Otherwise significant for cataract extraction.  He has
had several toes amputated in the past.
 
MEDICATIONS:  Consists of Lipitor, Proscar, metformin, metoprolol, Flomax, and
warfarin.
 
ALLERGIES:  To PENICILLIN.
 
FAMILY HISTORY:  Negative for heart disease.
 
SOCIAL HISTORY:  He is .  He and his wife live in [place], [place]. I
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  He quit smoking years ago.  No alcohol
abuse.
 
REVIEW OF SYSTEMS:  He has had no history of stroke, asthma, peptic ulcer
disease, or liver disease.  He has chronic kidney disease.  No cancer.  No
psychiatric illness.
 
 
 
 
[place] [address]                    CONSULTATION                  
_______________________________________________________________________________
 
Name:       YAKOV RODRIGUEZ             Room:           [address]#:  [id number]      Account #:      [id number]  
Admission:  07/23/18     Attend Phys:    Fernando Abel MD 
Discharge:               Date of Birth:  [birth year]  
         Report #: [id number]
                                                                     [id number]  
_______________________________________________________________________________
PHYSICAL EXAMINATION:
GENERAL:  Revealed an elderly male, lying in bed, he appeared in no acute
distress.
VITAL SIGNS:  He had a blood pressure 140/70, pulse 60.  He is afebrile.
HEENT:  He was anicteric.  Conjunctivae are pink.  Mucous membranes are moist.
NECK:  Veins nondistended.  Neck is supple.
CHEST:  Clear to auscultation.
HEART:  Regular rate and rhythm, grade 2 systolic ejection murmur.
ABDOMEN:  Soft.
EXTREMITIES:  No edema.
SKIN:  Cool and dry.
NEUROLOGIC:  Nonfocal seen.
 
On the monitor, he appears to be in a paced rhythm.  His last echocardiogram
just done in May showed ejection fraction 45%, left atrial enlargement, there
was an artificial valve noted in the aortic position, no stenosis.  There was
only mild insufficiency of the valve.  There was moderate mitral regurgitation,
moderate tricuspid insufficiency, pulmonary artery pressure 45 mmHg.  His lab
work, sodium 128, creatinine 6.2, albumin is only 2.5.  His INR is 3.7.  White
blood cell count 6.0, hemoglobin 10.4.  He had a chest x-ray done 2 days ago
that showed cardiomegaly, aortic valve stent in place, single chamber pacemaker,
atelectasis, no effusions.
 
IMPRESSION AND RECOMMENDATIONS:
1.  Diabetes.
2.  Sick sinus syndrome.  The patient is ventricular paced.
3.  Peripheral arterial disease.  Previous bypass surgery on both legs.
4.  Previous transcatheter aortic valve replacement.  Valve appears to be
functioning normally.
5.  Permanent atrial fibrillation.  The patient has been chronically
anticoagulated.
6.  Hypertension.  The patient is on a beta blocker.
7.  Renal failure.  The patient is being considered for dialysis.
8.  Anemia.  No history of bleeding.
9.  Coronary artery disease.  Previous stents.  No recent angina.
10.  Hyperlipidemia.  The patient is on a statin drug.
11.  Foot wound.  The patient admitted with cellulitis.
12.  Need for kidney biopsy.  I would hold warfarin at this time.  When it is
felt to be safe and after surgery, I think it is reasonable to resume warfarin
and maintain an INR of 2 to 3.
 
 
 
 
<ELECTRONICALLY SIGNED>
                                        By:  Kofi Chong MD, FACC      
07/30/18     1822
D: 07/30/18 1125_______________________________________
T: 07/30/18 1707Dawilliam Chong MD, FAC         /nt

## 2018-07-30 NOTE — CON
[address]  [address]                    CONSULTATION                  
_______________________________________________________________________________
 
Name:       YAKOV RODRIGUEZ             Room:           [address] [place]  
[facility]#:  [id number]      Account #:      [id number]  
Admission:  07/23/18     Attend Phys:    Fernando Abel MD 
Discharge:               Date of Birth:  [birth year]  
         Report #: [id number]
                                                                     [id number]  
_______________________________________________________________________________
THIS REPORT FOR:  //name//                      
 
CC: Fernando Abel
    Sergio Landin
 
DATE OF SERVICE:  07/27/2018
 
 
Nephrology Consultation
 
CONSULTING PHYSICIAN:  Fernando Abel MD
 
REASON FOR CONSULTATION:  Acute kidney injury.
 
HISTORY OF PRESENT ILLNESS:  A 65-year-old gentleman with no known previous
history of kidney disease, no regular NSAID use, who was admitted with
cellulitis and diabetic foot ulcer.  He has been on antibiotics and being
followed by infectious disease.  His serum creatinine increased, which is why I
was consulted.  His creatinine dropped from 2.1 to 3.6.  His vancomycin and
Lasix were discontinued.  He has had diminished appetite, thought to be due to
the antibiotics, but otherwise appears to be comfortable and does not have any
complaints at this time.
 
REVIEW OF SYSTEMS:  Constitutional, psych, heme, eyes, ENT, respiratory,
cardiac, GI, , and endocrine, all negative except as documented above.
 
PAST MEDICAL HISTORY:  Aortic valve replacement in 01/2015, a-fib, coronary
artery disease, peripheral vascular disease with history of fem to tib artery
bypass and fem-pop bypass, as well as left toe amputation and history of right
great toe and second toe amputation, dyslipidemia, diabetes type 2, pacemaker. 
 
FAMILY HISTORY:  Not pertinent for this 85-year-old gentleman.
 
SOCIAL HISTORY:  Former smoker.
 
PHYSICAL EXAMINATION:
VITAL SIGNS:  Blood pressure 110/53, pulse 64, temperature 36.9.
GENERAL:  No acute distress.
EYES:  Extraocular movements intact.
EARS:  Externally normal.
CARDIOVASCULAR:  Regular rate.
LUNGS:  No crackles.
ABDOMEN:  Soft.
LYMPHATICS:  No pitting edema.
PSYCHIATRIC:  Awake, alert.
 
 
 
 
[place] [address]                    CONSULTATION                  
_______________________________________________________________________________
 
Name:       YAKOV RODRIGUEZ             Room:           [address].#:  [id number]      Account #:      [id number]  
Admission:  07/23/18     Attend Phys:    Fernadno Abel MD 
Discharge:               Date of Birth:  [birth year]  
         Report #: [id number]
                                                                     [id number]  
_______________________________________________________________________________
LABORATORY DATA:  White cell count 11.2, hemoglobin 11.5, platelets 182.  Sodium
135, potassium 4.3, chloride 101, bicarbonate 26, BUN 61, creatinine 3.6,
glucose 120, calcium 8.2, albumin 2.1.
 
ASSESSMENT:
1.  Acute kidney injury with a creatinine increasing from 2.1 on 07/26 to 3.6 on
07/27.  Vancomycin and Lasix were discontinued on 07/27.
2.  Chronic kidney disease stage 3 with baseline creatinine, which appears to be
1.4 to 1.6.
3.  Hypoalbuminemia with albumin of 2.1.
4.  Diabetic foot ulcer.
5.  Peripheral vascular disease.
6.  Coronary artery disease.
7.  History of atrial fibrillation.
8.  History of aortic valve replacement.
9.  Permanent pacemaker.
 
PLAN:
1.  We will increase IV fluids from 50 mL an hour to 100 mL an hour.  Lasix has
been discontinued.  We will order a chest x-ray for the a.m.
2.  Check magnesium.
3.  Check CK.
4.  Check vancomycin level in the a.m.
5.  Check UA with micro.
6.  Check renal ultrasound.
7.  Check bladder scan.
8.  Strict Is and Os.
9.  A.m. labs.
 
Thank you for requesting my opinion in the care of this patient.
 
 
 
 
 
 
 
 
 
 
 
 
 
 
<ELECTRONICALLY SIGNED>
                                        By:  Caroline Brown MD              
07/30/18     1543
D: 07/27/18 1449_______________________________________
T: 07/28/18 0126Agilmar Brown MD                 /nt

## 2018-07-30 NOTE — NUR
PATIENT RESTING QUIETLY THROUGHOUT NIGHT.  NO C/O N/V OR PAIN.  PATIENT
REPOSITIONED AT LEAST EVERY 2HOURS, AT TIMES PATIENT REPOSITIONING SELF.
DRESSING DRY AND INTACT TO LOWER EXTREMITIES.  IVF INFUSING WITHOUT
DIFFICULTY.  NPO AT THIS TIME.  CONTINUES TO ONLY HAVE A SCANT AMOUNT OF
BLOODY URINE IN CATHETER BAG.  CONTINUES ON PO ANTIBIODICS.  BED ALARM ON.
CALL LIGHT WITHIN REACH

## 2018-07-30 NOTE — NUR
PATIENT HAS BEEN ALERT AND ORIENTED TODAY, VITAL SIGNS STABLE ON ROOM AIR. NO
COMPLAINTS OF ANY PAIN TODAY. TEMPORARY DIALYSIS CATH PLACED TODAY AND
DIALYSIS STARTED THIS AFTERNOON. FAMILY HAS BEEN AT BEDSIDE MOST OF THE DAY.
CALL LIGHT IS IN REACH, WILL CONTINUE TO MONITOR. VASCULAR CHANGED DRESSING
TODAY AND TOOK PICTURES OF WOUNDS.

## 2018-07-31 VITALS — SYSTOLIC BLOOD PRESSURE: 147 MMHG | DIASTOLIC BLOOD PRESSURE: 71 MMHG

## 2018-07-31 VITALS — SYSTOLIC BLOOD PRESSURE: 153 MMHG | DIASTOLIC BLOOD PRESSURE: 81 MMHG

## 2018-07-31 VITALS — SYSTOLIC BLOOD PRESSURE: 131 MMHG | DIASTOLIC BLOOD PRESSURE: 62 MMHG

## 2018-07-31 VITALS — SYSTOLIC BLOOD PRESSURE: 138 MMHG | DIASTOLIC BLOOD PRESSURE: 60 MMHG

## 2018-07-31 LAB
ABSOLUTE EOSINOPHILS: 0.1 THOU/UL (ref 0–0.7)
ABSOLUTE MONOCYTES: 0.5 THOU/UL (ref 0–1.2)
ALBUMIN SERPL-MCNC: 2.6 G/DL (ref 3.4–5)
ALP SERPL-CCNC: 66 U/L (ref 46–116)
ALT SERPL-CCNC: 20 U/L (ref 30–65)
ANION GAP SERPL CALC-SCNC: 12 MMOL/L (ref 7–16)
ANISOCYTOSIS BLD QL SMEAR: (no result)
AST SERPL-CCNC: 26 U/L (ref 15–37)
BILIRUB SERPL-MCNC: 0.7 MG/DL
BUN SERPL-MCNC: 72 MG/DL (ref 7–18)
CALCIUM SERPL-MCNC: 8.1 MG/DL (ref 8.5–10.1)
CHLORIDE SERPL-SCNC: 100 MMOL/L (ref 98–107)
CO2 SERPL-SCNC: 23 MMOL/L (ref 21–32)
CREAT SERPL-MCNC: 5.5 MG/DL (ref 0.6–1.3)
EOSINOPHIL NFR BLD: 1 %
GLUCOSE SERPL-MCNC: 90 MG/DL (ref 70–99)
GRANULOCYTES NFR BLD MANUAL: 81 %
HCT VFR BLD CALC: 30.7 % (ref 42–52)
HGB BLD-MCNC: 10.2 GM/DL (ref 14–18)
INR PPP: 2.8
KAPPA LC SERPL-MCNC: 165.6 MG/L (ref 3.3–19.4)
LAMBDA LC FREE SERPL-MCNC: 165.9 MG/L (ref 5.7–26.3)
LYMPHOCYTES # BLD: 1 THOU/UL (ref 0.8–5.3)
LYMPHOCYTES NFR BLD AUTO: 12 %
MCH RBC QN AUTO: 30.1 PG (ref 26–34)
MCHC RBC AUTO-ENTMCNC: 33.2 G/DL (ref 28–37)
MCV RBC: 90.7 FL (ref 80–100)
MONOCYTES NFR BLD: 6 %
MPV: 7.5 FL. (ref 7.2–11.1)
NEUTROPHILS # BLD: 7 THOU/UL (ref 1.6–8.1)
NUCLEATED RBCS: 0 /100WBC
PHOSPHATE SERPL-MCNC: 6.7 MG/DL (ref 2.5–4.9)
PLATELET # BLD EST: ADEQUATE 10*3/UL
PLATELET COUNT*: 188 THOU/UL (ref 150–400)
POIKILOCYTOSIS BLD QL SMEAR: (no result)
POTASSIUM SERPL-SCNC: 4.6 MMOL/L (ref 3.5–5.1)
PREALB SERPL-MCNC: 11.4 MG/DL (ref 18–35.7)
PROT SERPL-MCNC: 5.8 G/DL (ref 6.4–8.2)
PROTHROMBIN TIME: 26.5 SECONDS (ref 9.2–11.5)
RBC # BLD AUTO: 3.38 MIL/UL (ref 4.5–6)
RDW-CV: 16.8 % (ref 10.5–14.5)
SODIUM SERPL-SCNC: 135 MMOL/L (ref 136–145)
WBC # BLD AUTO: 8.7 THOU/UL (ref 4–11)

## 2018-07-31 PROCEDURE — B548ZZA ULTRASONOGRAPHY OF SUPERIOR VENA CAVA, GUIDANCE: ICD-10-PCS

## 2018-07-31 PROCEDURE — 5A1D70Z PERFORMANCE OF URINARY FILTRATION, INTERMITTENT, LESS THAN 6 HOURS PER DAY: ICD-10-PCS

## 2018-07-31 PROCEDURE — B5181ZA FLUOROSCOPY OF SUPERIOR VENA CAVA USING LOW OSMOLAR CONTRAST, GUIDANCE: ICD-10-PCS | Performed by: INTERNAL MEDICINE

## 2018-07-31 PROCEDURE — 02HV33Z INSERTION OF INFUSION DEVICE INTO SUPERIOR VENA CAVA, PERCUTANEOUS APPROACH: ICD-10-PCS

## 2018-07-31 NOTE — NUR
PATIENT A&OX4,FORGETFUL. CAN BECOME CONFUSED AFTER NAPS LATER DURING THE DAY.
ON ROOM AIR, IV RIGHT FOREARM SALINE LOCK. UP WITH MAX ASSISTX2 WITH WALKER
AND GAITBELT. DIABETIC SHOES TO BE ON WHEN UP OUT OF BED. DRESSING TO RIGHT
FOOT CHANGED, WOUND CLEANED. FOLETY CATHTER, MINIMAL OUTPUT, RED IN COLOR. NO
C/O PAIN/N/V. NO OTHER CONCERNS AT THIS TIME. APPROPRIATE AND COOPORATIVE
WITH CARE.
DR. NOYOLA CONCERNED WITH CARDIOLOGY CONSULT. CARDIOLOGY SEEN PATIENT ON
7/30/18. STATES TO HOLD COUMADIN. ONCE INR SAFE CAN HAVE KIDNEY BIOPSY, RESUME
COUMADIN AFTER SURGERY.  CONSULT PRINTED AND PLACED ON CHART.

## 2018-07-31 NOTE — NUR
PT SLEPT ON AND OFF OVERNIGHT. AO, FORGETFUL. HS ACCUCHECK 126.DRSG INTACT TO
R FOOT, TUBIGRIP TO LLE. DIALYSIS TEMP CATH TO R NECK. TURNED AND REPOSITIONED
Q2 HOURS AND PRN FOR SKIN CARE AND COMFORT, PT REFUSING AT TIMES. AM LABS
DRAWN. R FA SL, ABX GIVEN AS ORDERED. PACEMAKER, TELE V PACED. PT FORGETFUL
AND AGITATED THIS MORNING, WANTING TO URINATE IN TRASH CAN, REMINDED OF VAUGHN
CATH IN PLACE AND PT CALMED. CALL LITE IN EASY REACH, ABLE TO USE CALL LITE
AND MAKE NEEDS KNOWN.

## 2018-08-01 VITALS — SYSTOLIC BLOOD PRESSURE: 147 MMHG | DIASTOLIC BLOOD PRESSURE: 77 MMHG

## 2018-08-01 VITALS — DIASTOLIC BLOOD PRESSURE: 76 MMHG | SYSTOLIC BLOOD PRESSURE: 149 MMHG

## 2018-08-01 VITALS — SYSTOLIC BLOOD PRESSURE: 165 MMHG | DIASTOLIC BLOOD PRESSURE: 83 MMHG

## 2018-08-01 VITALS — SYSTOLIC BLOOD PRESSURE: 128 MMHG | DIASTOLIC BLOOD PRESSURE: 68 MMHG

## 2018-08-01 VITALS — SYSTOLIC BLOOD PRESSURE: 150 MMHG | DIASTOLIC BLOOD PRESSURE: 79 MMHG

## 2018-08-01 VITALS — SYSTOLIC BLOOD PRESSURE: 124 MMHG | DIASTOLIC BLOOD PRESSURE: 70 MMHG

## 2018-08-01 LAB
ABSOLUTE BASOPHILS: 0.1 THOU/UL (ref 0–0.2)
ABSOLUTE EOSINOPHILS: 0.2 THOU/UL (ref 0–0.7)
ABSOLUTE MONOCYTES: 1 THOU/UL (ref 0–1.2)
ALBUMIN SERPL-MCNC: 2.7 G/DL (ref 3.4–5)
ALBUMIN SERPL-MCNC: 2.8 G/DL (ref 3.4–5)
ALP SERPL-CCNC: 70 U/L (ref 46–116)
ALT SERPL-CCNC: 21 U/L (ref 30–65)
ANION GAP SERPL CALC-SCNC: 12 MMOL/L (ref 7–16)
ANION GAP SERPL CALC-SCNC: 13 MMOL/L (ref 7–16)
AST SERPL-CCNC: 26 U/L (ref 15–37)
BASOPHILS NFR BLD AUTO: 0.5 %
BILIRUB SERPL-MCNC: 0.7 MG/DL
BUN SERPL-MCNC: 52 MG/DL (ref 7–18)
BUN SERPL-MCNC: 55 MG/DL (ref 7–18)
CALCIUM SERPL-MCNC: 8.3 MG/DL (ref 8.5–10.1)
CALCIUM SERPL-MCNC: 8.5 MG/DL (ref 8.5–10.1)
CHLORIDE SERPL-SCNC: 100 MMOL/L (ref 98–107)
CHLORIDE SERPL-SCNC: 99 MMOL/L (ref 98–107)
CO2 SERPL-SCNC: 24 MMOL/L (ref 21–32)
CO2 SERPL-SCNC: 24 MMOL/L (ref 21–32)
CREAT SERPL-MCNC: 5.4 MG/DL (ref 0.6–1.3)
CREAT SERPL-MCNC: 5.5 MG/DL (ref 0.6–1.3)
EOSINOPHIL NFR BLD: 2.1 %
GLOBULIN TOTAL: 3 G/DL (ref 2.2–3.9)
GLOMERULR BASEM MEMBRN AB: 5 UNITS (ref 0–20)
GLUCOSE SERPL-MCNC: 113 MG/DL (ref 70–99)
GLUCOSE SERPL-MCNC: 125 MG/DL (ref 70–99)
GRANULOCYTES NFR BLD MANUAL: 80.1 %
HCT VFR BLD CALC: 32.4 % (ref 42–52)
HGB BLD-MCNC: 10.8 GM/DL (ref 14–18)
INR PPP: 1.7
LYMPHOCYTES # BLD: 0.8 THOU/UL (ref 0.8–5.3)
LYMPHOCYTES NFR BLD AUTO: 8 %
MCH RBC QN AUTO: 30 PG (ref 26–34)
MCHC RBC AUTO-ENTMCNC: 33.2 G/DL (ref 28–37)
MCV RBC: 90.4 FL (ref 80–100)
MONOCYTES NFR BLD: 9.3 %
MPV: 7.3 FL. (ref 7.2–11.1)
NEUTROPHILS # BLD: 8.3 THOU/UL (ref 1.6–8.1)
NUCLEATED RBCS: 0 /100WBC
PHOSPHATE SERPL-MCNC: 5.8 MG/DL (ref 2.5–4.9)
PLATELET COUNT*: 232 THOU/UL (ref 150–400)
POTASSIUM SERPL-SCNC: 4.5 MMOL/L (ref 3.5–5.1)
POTASSIUM SERPL-SCNC: 4.9 MMOL/L (ref 3.5–5.1)
PROT SERPL-MCNC: 6 G/DL (ref 6.4–8.2)
PROTHROMBIN TIME: 16.2 SECONDS (ref 9.2–11.5)
RBC # BLD AUTO: 3.59 MIL/UL (ref 4.5–6)
RDW-CV: 16.7 % (ref 10.5–14.5)
SODIUM SERPL-SCNC: 135 MMOL/L (ref 136–145)
SODIUM SERPL-SCNC: 137 MMOL/L (ref 136–145)
WBC # BLD AUTO: 10.3 THOU/UL (ref 4–11)

## 2018-08-01 NOTE — NUR
WOUND CARE NOTE:  CONSULT RECEIVED FOR FOOT WOUNDS.
 
PATIENT PRESENTS WITH MANY DIFFERENT AREAS OF SKIN BREAKDOWN.
 
RIGHT 3RD TOE:  DISCOLORATION WITH BLISTERING.  BLISTER SEEMS TO BE RESOLVING,
PURPLE DISCOLORATION NOW REMAINS.  THIS WAS PAINTED WITH BETADINE.
 
RIGHT MEDAIL LEG:  ULCERATION MEASURING 2.3X1.2X0.1.  DRY, YELLOW WOUND BED.
ANNETTE-WOUND INTACT.  CLEANSED WITH WOUND CLEANSER, PATTED DRY.  APPLIED
OPTIFOAM AG AND SECURED WITH KERLIX.
 
RIGHT FOOT, PLANTAR SURFACE, MEDIAL:  0.9X1.4X0.2.  MOIST, YELLOW WOUND BED.
CLEANSED WITH WOUND CLEANSER, PATTED DRY.  APPLIED AQUACEL AG AND COVERED WITH
ABD.  SECURED WITH KERLIX.
 
RIGHT FOOT, PLANTAR SURFACE, LATERAL:  2.3X1.8X1.6.  MOIST, YELLOW WOUND BED
WITH ADHERENT YELLOW MOIST SLOUGH TISSUE.  ANNETTE-WOUND WITH MACERATION.
CLEANSED WITH WOUND CLEANSER, PATTED DRY.  APPLIED AQUACEL AG AND COVERED WITH
ABD.  SECURED WITH KERLIX.
 
RECOMMEND
SURGICAL SHOE WHEN AMBULATING/TRANSFERING.
ENCOURAGE GOOD NUTRITION/HYDRATION
FOLLOW UP IN WOUND CENTER FOR HBO TREATMENT.

## 2018-08-01 NOTE — NUR
PATIENT A&OX4, FORGETFUL, ROOM  AIR, IV RIGHT FOREARM SALINE LOCK. UP WITH
ASSISTX2 WITH WALKER AND GAITBELT, WEAK. VAUGHN CATHETER, MINIMAL TO NO OUPUT,
BLOODY TINGED. NO C/O PAIN/N/V. DRSG TO RIGHT FOOT AND LEG C/D/I, CHANGED BY
WOUND NURSE. SCHEDULED FOR KIDNEY BIOPSEY TOMORROW MORNING. NO OTHER CONCERNS
AT THIS TIME. APPROPRIATE AND COOPORATIVE WITH CARE.

## 2018-08-01 NOTE — NUR
ASSESSMENT COMPLETE. PT SLEPT THROUGH THE NIGHT WITHOUT ANY CONCERNS. PT
REFUSED Q2 TURNS. IV ABX GIVEN AS SCHEDULED. VAUGHN IN PLACE. PT HAS ADEQAUTE
SATS ON ROOM AIR. DENIES PAIN AND N/V. IV IN RIGHT FOREARM, SALINE LOCKED AND
FLUSHES WITHOUT DIFFICULTY. SEE ASSESSMENT AND VITALS FOR OTHER DETAILS. CALL
LIGHT WITHIN REACH, BED ALARM ON. WILL CONTINUE PLAN OF CARE

## 2018-08-02 VITALS — DIASTOLIC BLOOD PRESSURE: 68 MMHG | SYSTOLIC BLOOD PRESSURE: 129 MMHG

## 2018-08-02 VITALS — SYSTOLIC BLOOD PRESSURE: 117 MMHG | DIASTOLIC BLOOD PRESSURE: 51 MMHG

## 2018-08-02 VITALS — SYSTOLIC BLOOD PRESSURE: 122 MMHG | DIASTOLIC BLOOD PRESSURE: 44 MMHG

## 2018-08-02 VITALS — SYSTOLIC BLOOD PRESSURE: 97 MMHG | DIASTOLIC BLOOD PRESSURE: 47 MMHG

## 2018-08-02 VITALS — SYSTOLIC BLOOD PRESSURE: 133 MMHG | DIASTOLIC BLOOD PRESSURE: 74 MMHG

## 2018-08-02 VITALS — SYSTOLIC BLOOD PRESSURE: 114 MMHG | DIASTOLIC BLOOD PRESSURE: 47 MMHG

## 2018-08-02 VITALS — SYSTOLIC BLOOD PRESSURE: 145 MMHG | DIASTOLIC BLOOD PRESSURE: 74 MMHG

## 2018-08-02 VITALS — SYSTOLIC BLOOD PRESSURE: 101 MMHG | DIASTOLIC BLOOD PRESSURE: 60 MMHG

## 2018-08-02 VITALS — SYSTOLIC BLOOD PRESSURE: 140 MMHG | DIASTOLIC BLOOD PRESSURE: 61 MMHG

## 2018-08-02 VITALS — SYSTOLIC BLOOD PRESSURE: 105 MMHG | DIASTOLIC BLOOD PRESSURE: 41 MMHG

## 2018-08-02 VITALS — DIASTOLIC BLOOD PRESSURE: 69 MMHG | SYSTOLIC BLOOD PRESSURE: 146 MMHG

## 2018-08-02 VITALS — SYSTOLIC BLOOD PRESSURE: 123 MMHG | DIASTOLIC BLOOD PRESSURE: 50 MMHG

## 2018-08-02 VITALS — SYSTOLIC BLOOD PRESSURE: 107 MMHG | DIASTOLIC BLOOD PRESSURE: 43 MMHG

## 2018-08-02 VITALS — DIASTOLIC BLOOD PRESSURE: 41 MMHG | SYSTOLIC BLOOD PRESSURE: 109 MMHG

## 2018-08-02 VITALS — SYSTOLIC BLOOD PRESSURE: 108 MMHG | DIASTOLIC BLOOD PRESSURE: 64 MMHG

## 2018-08-02 LAB
ALBUMIN SERPL-MCNC: 2.4 G/DL (ref 3.4–5)
ANION GAP SERPL CALC-SCNC: 10 MMOL/L (ref 7–16)
APTT BLD: 35.9 SECONDS (ref 25–31.3)
BUN SERPL-MCNC: 63 MG/DL (ref 7–18)
CALCIUM SERPL-MCNC: 8.5 MG/DL (ref 8.5–10.1)
CHLORIDE SERPL-SCNC: 99 MMOL/L (ref 98–107)
CO2 SERPL-SCNC: 24 MMOL/L (ref 21–32)
CREAT SERPL-MCNC: 6.5 MG/DL (ref 0.6–1.3)
GLUCOSE SERPL-MCNC: 128 MG/DL (ref 70–99)
HCT VFR BLD CALC: 33.8 % (ref 42–52)
HGB BLD-MCNC: 11 GM/DL (ref 14–18)
INR PPP: 1.4
INR PPP: 1.7
MCH RBC QN AUTO: 29.2 PG (ref 26–34)
MCHC RBC AUTO-ENTMCNC: 32.4 G/DL (ref 28–37)
MCV RBC: 89.9 FL (ref 80–100)
MPV: 7.1 FL. (ref 7.2–11.1)
PHOSPHATE SERPL-MCNC: 6.4 MG/DL (ref 2.5–4.9)
PLATELET COUNT*: 237 THOU/UL (ref 150–400)
POTASSIUM SERPL-SCNC: 4.7 MMOL/L (ref 3.5–5.1)
PROTHROMBIN TIME: 14 SECONDS (ref 9.2–11.5)
PROTHROMBIN TIME: 16.1 SECONDS (ref 9.2–11.5)
RBC # BLD AUTO: 3.76 MIL/UL (ref 4.5–6)
RDW-CV: 16.5 % (ref 10.5–14.5)
SODIUM SERPL-SCNC: 133 MMOL/L (ref 136–145)
WBC # BLD AUTO: 10.5 THOU/UL (ref 4–11)

## 2018-08-02 PROCEDURE — 5A1D70Z PERFORMANCE OF URINARY FILTRATION, INTERMITTENT, LESS THAN 6 HOURS PER DAY: ICD-10-PCS

## 2018-08-02 PROCEDURE — BT42ZZZ ULTRASONOGRAPHY OF LEFT KIDNEY: ICD-10-PCS | Performed by: INTERNAL MEDICINE

## 2018-08-02 PROCEDURE — 0TB13ZX EXCISION OF LEFT KIDNEY, PERCUTANEOUS APPROACH, DIAGNOSTIC: ICD-10-PCS | Performed by: RADIOLOGY

## 2018-08-02 NOTE — NUR
PT SLEPT FAIRLY WELL OVERNIGHT. HS ACCUCHECK 167. TURNED AND REPOSITIONED Q2
HOURS AND PRN AS PT WOULD ALLOW FOR SKIN CARE AND COMFORT. DRSG CDI TO
RLE.TUBIGRIP IN PLACE LLE. VAUGHN WITH 40ML BLOODY DRAINAGE OVERNIGHT. RFA IV
SL, ABX GIVEN. AM LABS DRAWN THIS MORNING. PT AOX4, FORGETFUL AND APPEARS A
LITTLE CONFUSED OVERNIGHT AT TIMES. DENIES PAIN. NPO SINCE MIDNIGHT FOR KIDNEY
BIOPSY TODAY. CALL LITE IN EASY REACH, BED ALARM ON FOR SAFETY.

## 2018-08-02 NOTE — NUR
CM SPOKE TO THE PATIENT SPOUSE TO DISCUSS DISCHARGE PLANNING NEEDS, AND ANY
QUESTIONS OR CONCERNS THAT THEY MAY HAVE. PATIENT'S SPOUSE HAS NO QUESTIONS OR
CONCERNS AT THIS TIME AND STATES THAT SHE 'STILL HOPES TO BE ABLE TO HAVE THE
PATIENT RETURN HOME AT D/C'. CM WILL REMAIN AVAILABLE TO ASSIST AND FOLLOW AS
NEEDED.

## 2018-08-03 VITALS — DIASTOLIC BLOOD PRESSURE: 64 MMHG | SYSTOLIC BLOOD PRESSURE: 142 MMHG

## 2018-08-03 VITALS — DIASTOLIC BLOOD PRESSURE: 72 MMHG | SYSTOLIC BLOOD PRESSURE: 147 MMHG

## 2018-08-03 VITALS — SYSTOLIC BLOOD PRESSURE: 152 MMHG | DIASTOLIC BLOOD PRESSURE: 81 MMHG

## 2018-08-03 VITALS — SYSTOLIC BLOOD PRESSURE: 155 MMHG | DIASTOLIC BLOOD PRESSURE: 78 MMHG

## 2018-08-03 VITALS — DIASTOLIC BLOOD PRESSURE: 55 MMHG | SYSTOLIC BLOOD PRESSURE: 144 MMHG

## 2018-08-03 VITALS — DIASTOLIC BLOOD PRESSURE: 68 MMHG | SYSTOLIC BLOOD PRESSURE: 161 MMHG

## 2018-08-03 VITALS — DIASTOLIC BLOOD PRESSURE: 78 MMHG | SYSTOLIC BLOOD PRESSURE: 138 MMHG

## 2018-08-03 LAB
ABSOLUTE EOSINOPHILS: 0.2 THOU/UL (ref 0–0.7)
ABSOLUTE MONOCYTES: 1 THOU/UL (ref 0–1.2)
ALBUMIN SERPL-MCNC: 2.9 G/DL (ref 3.4–5)
ALP SERPL-CCNC: 66 U/L (ref 46–116)
ALT SERPL-CCNC: 18 U/L (ref 30–65)
ANION GAP SERPL CALC-SCNC: 12 MMOL/L (ref 7–16)
AST SERPL-CCNC: 27 U/L (ref 15–37)
BILIRUB SERPL-MCNC: 0.7 MG/DL
BUN SERPL-MCNC: 47 MG/DL (ref 7–18)
CALCIUM SERPL-MCNC: 8.7 MG/DL (ref 8.5–10.1)
CHLORIDE SERPL-SCNC: 101 MMOL/L (ref 98–107)
CO2 SERPL-SCNC: 24 MMOL/L (ref 21–32)
CREAT SERPL-MCNC: 5.4 MG/DL (ref 0.6–1.3)
EOSINOPHIL NFR BLD: 2 %
GLUCOSE SERPL-MCNC: 92 MG/DL (ref 70–99)
GRANULOCYTES NFR BLD MANUAL: 82 %
HCT VFR BLD CALC: 32.4 % (ref 42–52)
HGB BLD-MCNC: 10.6 GM/DL (ref 14–18)
LYMPHOCYTES # BLD: 0.8 THOU/UL (ref 0.8–5.3)
LYMPHOCYTES NFR BLD AUTO: 7 %
MCH RBC QN AUTO: 29.7 PG (ref 26–34)
MCHC RBC AUTO-ENTMCNC: 32.7 G/DL (ref 28–37)
MCV RBC: 90.7 FL (ref 80–100)
MONOCYTES NFR BLD: 9 %
MPV: 7.3 FL. (ref 7.2–11.1)
NEUTROPHILS # BLD: 9.4 THOU/UL (ref 1.6–8.1)
NUCLEATED RBCS: 0 /100WBC
PLATELET # BLD EST: ADEQUATE 10*3/UL
PLATELET COUNT*: 213 THOU/UL (ref 150–400)
POTASSIUM SERPL-SCNC: 4.5 MMOL/L (ref 3.5–5.1)
PROT SERPL-MCNC: 6.4 G/DL (ref 6.4–8.2)
RBC # BLD AUTO: 3.57 MIL/UL (ref 4.5–6)
RBC MORPH BLD: NORMAL
RDW-CV: 16.8 % (ref 10.5–14.5)
SODIUM SERPL-SCNC: 137 MMOL/L (ref 136–145)
WBC # BLD AUTO: 11.5 THOU/UL (ref 4–11)

## 2018-08-03 NOTE — NUR
ASSUMED CARE OF PT AT 1900 PT ALERT AND ORIENTED X 4 BUT CONFUSED AT TIMES. PT
VS AND ASSESSMENT STABLE PT DENIED ANY COMPLAINTS AND SLEPT THROUGH THE NIGHT.
PT V PACED ON THE MONITOR. WILL CONTINUE PLAN OF CARE.

## 2018-08-03 NOTE — NUR
PT REMAINS ON UNIT FOR CELLULITIS TO RLE AND DIABETIC ULCER TO RT FOOT, PT ON
ACCU CHECK ACHS, NO INSULIN AT THS TIME, PT IS V- PACED AT 60bpm, VAUGHN
CATHETER IN PLACE WITH MINIMAL OUT PUT NOTED, DIALYSIS SCHDEULED FOR 8/5/18
AND LABS ORDERED, IV TO LEFT FA FLUSHED. CURRENTLY SALINE LOCKED. PT ABLE TO
MAKE NEEDS KNOWN, NO C/O PAIN NOTED, HOURLY ROUNDING MAINTAINED

## 2018-08-04 VITALS — DIASTOLIC BLOOD PRESSURE: 79 MMHG | SYSTOLIC BLOOD PRESSURE: 165 MMHG

## 2018-08-04 VITALS — SYSTOLIC BLOOD PRESSURE: 128 MMHG | DIASTOLIC BLOOD PRESSURE: 64 MMHG

## 2018-08-04 LAB
ABSOLUTE BASOPHILS: 0.1 THOU/UL (ref 0–0.2)
ABSOLUTE EOSINOPHILS: 0.2 THOU/UL (ref 0–0.7)
ABSOLUTE MONOCYTES: 1.2 THOU/UL (ref 0–1.2)
ALBUMIN SERPL-MCNC: 2.6 G/DL (ref 3.4–5)
ALP SERPL-CCNC: 66 U/L (ref 46–116)
ALT SERPL-CCNC: 10 U/L (ref 30–65)
ANION GAP SERPL CALC-SCNC: 12 MMOL/L (ref 7–16)
AST SERPL-CCNC: 23 U/L (ref 15–37)
BASOPHILS NFR BLD AUTO: 0.5 %
BILIRUB SERPL-MCNC: 0.8 MG/DL
BUN SERPL-MCNC: 58 MG/DL (ref 7–18)
CALCIUM SERPL-MCNC: 8.5 MG/DL (ref 8.5–10.1)
CHLORIDE SERPL-SCNC: 101 MMOL/L (ref 98–107)
CO2 SERPL-SCNC: 25 MMOL/L (ref 21–32)
CREAT SERPL-MCNC: 6.8 MG/DL (ref 0.6–1.3)
EOSINOPHIL NFR BLD: 1.6 %
GLUCOSE SERPL-MCNC: 129 MG/DL (ref 70–99)
GRANULOCYTES NFR BLD MANUAL: 81.6 %
HCT VFR BLD CALC: 34.5 % (ref 42–52)
HGB BLD-MCNC: 11.3 GM/DL (ref 14–18)
LYMPHOCYTES # BLD: 1 THOU/UL (ref 0.8–5.3)
LYMPHOCYTES NFR BLD AUTO: 7.5 %
MCH RBC QN AUTO: 29.1 PG (ref 26–34)
MCHC RBC AUTO-ENTMCNC: 32.6 G/DL (ref 28–37)
MCV RBC: 89 FL (ref 80–100)
MONOCYTES NFR BLD: 8.8 %
MPV: 7 FL. (ref 7.2–11.1)
NEUTROPHILS # BLD: 10.8 THOU/UL (ref 1.6–8.1)
NUCLEATED RBCS: 0 /100WBC
PHOSPHATE SERPL-MCNC: 7 MG/DL (ref 2.5–4.9)
PLATELET COUNT*: 232 THOU/UL (ref 150–400)
POTASSIUM SERPL-SCNC: 5 MMOL/L (ref 3.5–5.1)
PROT SERPL-MCNC: 6.2 G/DL (ref 6.4–8.2)
RBC # BLD AUTO: 3.87 MIL/UL (ref 4.5–6)
RDW-CV: 16.7 % (ref 10.5–14.5)
SODIUM SERPL-SCNC: 138 MMOL/L (ref 136–145)
WBC # BLD AUTO: 13.2 THOU/UL (ref 4–11)

## 2018-08-04 PROCEDURE — 5A1D70Z PERFORMANCE OF URINARY FILTRATION, INTERMITTENT, LESS THAN 6 HOURS PER DAY: ICD-10-PCS | Performed by: INTERNAL MEDICINE

## 2018-08-04 NOTE — NUR
SHIFT NOTE - PT HAD DIALYSIS THIS AM.  THEY PULLED OFF 2.3L.  TOLERATED WELL.
FAMILY PRESENT AT BEDSIDE.  V-PACED IN MONITOR.  IV SL IN R FA FLUSHES WELL.
JOHANA PRESENT DRAINING WELL.

## 2018-08-05 VITALS — SYSTOLIC BLOOD PRESSURE: 158 MMHG | DIASTOLIC BLOOD PRESSURE: 83 MMHG

## 2018-08-05 VITALS — SYSTOLIC BLOOD PRESSURE: 132 MMHG | DIASTOLIC BLOOD PRESSURE: 77 MMHG

## 2018-08-05 VITALS — SYSTOLIC BLOOD PRESSURE: 160 MMHG | DIASTOLIC BLOOD PRESSURE: 65 MMHG

## 2018-08-05 VITALS — SYSTOLIC BLOOD PRESSURE: 134 MMHG | DIASTOLIC BLOOD PRESSURE: 74 MMHG

## 2018-08-05 VITALS — DIASTOLIC BLOOD PRESSURE: 63 MMHG | SYSTOLIC BLOOD PRESSURE: 174 MMHG

## 2018-08-05 LAB
ANION GAP SERPL CALC-SCNC: 7 MMOL/L (ref 7–16)
BUN SERPL-MCNC: 40 MG/DL (ref 7–18)
CALCIUM SERPL-MCNC: 8.5 MG/DL (ref 8.5–10.1)
CHLORIDE SERPL-SCNC: 102 MMOL/L (ref 98–107)
CO2 SERPL-SCNC: 28 MMOL/L (ref 21–32)
CREAT SERPL-MCNC: 5.3 MG/DL (ref 0.6–1.3)
GLUCOSE SERPL-MCNC: 242 MG/DL (ref 70–99)
HCT VFR BLD CALC: 35.9 % (ref 42–52)
HGB BLD-MCNC: 11.7 GM/DL (ref 14–18)
MAGNESIUM SERPL-MCNC: 2.2 MG/DL (ref 1.8–2.4)
MCH RBC QN AUTO: 29.5 PG (ref 26–34)
MCHC RBC AUTO-ENTMCNC: 32.7 G/DL (ref 28–37)
MCV RBC: 90.4 FL (ref 80–100)
MPV: 7.3 FL. (ref 7.2–11.1)
PLATELET COUNT*: 196 THOU/UL (ref 150–400)
POTASSIUM SERPL-SCNC: 5 MMOL/L (ref 3.5–5.1)
RBC # BLD AUTO: 3.97 MIL/UL (ref 4.5–6)
RDW-CV: 17.3 % (ref 10.5–14.5)
SODIUM SERPL-SCNC: 137 MMOL/L (ref 136–145)
WBC # BLD AUTO: 8.7 THOU/UL (ref 4–11)

## 2018-08-05 NOTE — NUR
SHIFT NOTE - PT AMBULATED FROM BED TO BATHROOM.  HAD MOD FORMED/BROWN STOOL.
PT SAT UP IN CHAIR FOR 4 HOURS THIS EARLY AFTERNOON.  IV SL IN R FA.  FAMILY
PRESENT AT BEDSIDE MOST OF THIS SHIFT.  PT TO HAVE DIALYSIS TOMORROW.  TEMP
DIALYSIS CATH IN PLACE R IJ.

## 2018-08-05 NOTE — NUR
ASSESSMENT COMPLETE. PT SLEPT THROUGH THE NIGHT WITHOUT ANY CONCERNS. PT
ENCOURAGED TO TURN Q2 FOR SKIN INTEGRITY. IV ABX GIVEN AS ORDERED. BLE
ELEVATED WITH PILLOWS. VAUGHN IN PLACE. IV IN RIGHT FOREARM, SALINE LOCKED AND
FLUSHES WITHOUT DIFFICULTY. PT ON MONITOR, PACED. PT DENIES PAIN AND N/V. SEE
ASSESSMENT AND VITALS FOR OTHER DETAILS. CALL LIGHT WITHIN REACH, BED ALARM
ON. WILL CONTINUE PLAN OF CARE

## 2018-08-06 VITALS — SYSTOLIC BLOOD PRESSURE: 152 MMHG | DIASTOLIC BLOOD PRESSURE: 87 MMHG

## 2018-08-06 VITALS — DIASTOLIC BLOOD PRESSURE: 83 MMHG | SYSTOLIC BLOOD PRESSURE: 139 MMHG

## 2018-08-06 VITALS — SYSTOLIC BLOOD PRESSURE: 120 MMHG | DIASTOLIC BLOOD PRESSURE: 76 MMHG

## 2018-08-06 VITALS — SYSTOLIC BLOOD PRESSURE: 130 MMHG | DIASTOLIC BLOOD PRESSURE: 72 MMHG

## 2018-08-06 VITALS — SYSTOLIC BLOOD PRESSURE: 123 MMHG | DIASTOLIC BLOOD PRESSURE: 62 MMHG

## 2018-08-06 VITALS — DIASTOLIC BLOOD PRESSURE: 68 MMHG | SYSTOLIC BLOOD PRESSURE: 128 MMHG

## 2018-08-06 VITALS — SYSTOLIC BLOOD PRESSURE: 149 MMHG | DIASTOLIC BLOOD PRESSURE: 84 MMHG

## 2018-08-06 LAB
ANION GAP SERPL CALC-SCNC: 10 MMOL/L (ref 7–16)
BUN SERPL-MCNC: 60 MG/DL (ref 7–18)
CALCIUM SERPL-MCNC: 8.7 MG/DL (ref 8.5–10.1)
CHLORIDE SERPL-SCNC: 102 MMOL/L (ref 98–107)
CO2 SERPL-SCNC: 24 MMOL/L (ref 21–32)
CREAT SERPL-MCNC: 6.4 MG/DL (ref 0.6–1.3)
GLUCOSE SERPL-MCNC: 269 MG/DL (ref 70–99)
HCT VFR BLD CALC: 35.4 % (ref 42–52)
HGB BLD-MCNC: 11.4 GM/DL (ref 14–18)
MAGNESIUM SERPL-MCNC: 2.6 MG/DL (ref 1.8–2.4)
MCH RBC QN AUTO: 29.1 PG (ref 26–34)
MCHC RBC AUTO-ENTMCNC: 32.1 G/DL (ref 28–37)
MCV RBC: 90.6 FL (ref 80–100)
MPV: 7.9 FL. (ref 7.2–11.1)
PHOSPHATE SERPL-MCNC: 7.5 MG/DL (ref 2.5–4.9)
PLATELET COUNT*: 225 THOU/UL (ref 150–400)
POTASSIUM SERPL-SCNC: 5.6 MMOL/L (ref 3.5–5.1)
RBC # BLD AUTO: 3.91 MIL/UL (ref 4.5–6)
RDW-CV: 17 % (ref 10.5–14.5)
SODIUM SERPL-SCNC: 136 MMOL/L (ref 136–145)
WBC # BLD AUTO: 13.1 THOU/UL (ref 4–11)

## 2018-08-06 NOTE — NUR
CONTINUE TO FOLLOW, DISCUSSED WITH DR GAMEZ.  AWAIT RENAL DIRECTION ON
WHETHER PT NEEDS OUTPT DIALYSIS.  MET WITH PT AND WIFE, PT STATES FEELING
'VERY LITTLE' BETTER.  STATES HASN'T BEEN WORKING MUCH WITH THERAPY.  PT AND
WIFE ARE STILL HOPEFUL PT CAN RETURN HOME AND BACK TO OUTPT HBO, PER WIFE, 'IF
HE'S STRONGER ENOUGH.'  DISCUSSED OPTIONS OF HOME WITH OUTPT TX VS SNF, BUT
COULDN'T GET HBO TX FROM SNF DUE TO PAYMENT ISSUES.  DID BRIEFLY DISCUSS [facility]
FOR HBO, DIALYSIS NEEDS AND THERAPY BUT PT BEGAN SHAKING HIS HEAD WHEN MADE
AWARE WHERE [place] [facility] [place].  WILL FOLLOW

## 2018-08-06 NOTE — NUR
LABS ELEVATED THIS AM AND THIS NURSE CALLED DIALYSIS CLINIC TO INQUIRE ABOUT
PT, DIALYSIS SCHEDULED FOR LATE AFTERNOON AND IS CURRENTLY STILL IN DIALYSIS,
IV MEDS INFUSED AS ORDERED, IV PATENT AND FLUSHED, PT APPETITE POOR, GLUCERNA
PROVIDED AND DRANK WITH SOME SUCCESS, EDUCATION PROVIDED TO PT FAMILY
REGARDING NEW DX OF CRESCENTRIC GLOMURELONEPHRITIS, MINIMAL OUTPUT AT 50CC VIA
CATHETER THIS SHIFT, NEW ORDER RECEIVED FOR SLIDING SCALE INSULIN, HOURLY
ROUNDING MAINTAINED.

## 2018-08-06 NOTE — NUR
PT SLEPT WELL OVERNIGHT. R IJ TEMP DIALYSIS CATH INTACT. VAUGHN DRAINING SCANT
AMOUNT BLOODY DRAINAGE OVERNIGHT. TELE PACED. HS ACCUCHECK 224. RFA SL IV, ABX
GIVEN AS ORDERED. AM LABS. PT REFUSING TURNS OVERNIGHT. DRSGS CDI TO BLE. CALL
LITE IN EASY REACH. BED ALARM ON FOR SAFETY.

## 2018-08-07 VITALS — DIASTOLIC BLOOD PRESSURE: 73 MMHG | SYSTOLIC BLOOD PRESSURE: 114 MMHG

## 2018-08-07 VITALS — SYSTOLIC BLOOD PRESSURE: 124 MMHG | DIASTOLIC BLOOD PRESSURE: 63 MMHG

## 2018-08-07 VITALS — DIASTOLIC BLOOD PRESSURE: 58 MMHG | SYSTOLIC BLOOD PRESSURE: 109 MMHG

## 2018-08-07 LAB
ALBUMIN SERPL-MCNC: 2.4 G/DL (ref 3.4–5)
ANION GAP SERPL CALC-SCNC: 9 MMOL/L (ref 7–16)
BUN SERPL-MCNC: 53 MG/DL (ref 7–18)
CALCIUM SERPL-MCNC: 8.3 MG/DL (ref 8.5–10.1)
CHLORIDE SERPL-SCNC: 98 MMOL/L (ref 98–107)
CO2 SERPL-SCNC: 29 MMOL/L (ref 21–32)
CREAT SERPL-MCNC: 5.4 MG/DL (ref 0.6–1.3)
GLUCOSE SERPL-MCNC: 169 MG/DL (ref 70–99)
PHOSPHATE SERPL-MCNC: 6.6 MG/DL (ref 2.5–4.9)
POTASSIUM SERPL-SCNC: 5 MMOL/L (ref 3.5–5.1)
SODIUM SERPL-SCNC: 136 MMOL/L (ref 136–145)

## 2018-08-07 PROCEDURE — 5A1D70Z PERFORMANCE OF URINARY FILTRATION, INTERMITTENT, LESS THAN 6 HOURS PER DAY: ICD-10-PCS | Performed by: INTERNAL MEDICINE

## 2018-08-07 NOTE — NUR
PATIENT HAD DIALYSIS THIS AM, 3L REMOVED. PATIENT UP TO BATHROOM WITH MAX
ASSITANCE; GAIT BELT/WALKER UTILIZED. PATIENT HAD MOD BM. VAUGHN REMAINS IN
PLACE WITH MINIMAL OUTPUT. NO COMPLAINTS OF PAIN. DR. NOYOLA FROM NEPHROLOGY
HERE THIS EVENING AND ORDERS TO HAVE DR. LOUIS PLACE A DIALYSIS CATH TOMORROW
IF OK WITH ID. DR. CROCKETT CONTACTED AND OK FOR PLACEMENT, AWAITING CALL BACK
FROM VASCULAR.

## 2018-08-07 NOTE — NUR
WOUND CARE NOTE:  REASSESSMENT OF RIGHT FOOT/LEG WOUNDS
 
RIGHT LATERAL LEG:  HEALING ULCERATION MEASURING 0.7X0.5X0.1, PALE, PINK,
MOIST WOUND BED.  CLEANSED WITH WOUND CLEANSER, PATTED DRY.  APPLIED OPTIFOAM
AG AND SECURED WITH KERLIX.
 
RIGHT MEDIAL LEG:  HEALING ULCERATION X2, CLUSTERED MEASURES 2X1.4X0.1.  AREA
IS HEALING, NEW EPITHELIUM TO ANNETTE-WOUND.  WOUND BED IS MOIST, PALE.  CLEANSED
WITH WOUND CLEANSER, PATTED DRY.  APPLIED OPTIFOAM AG AND SECURED WITH KERLIX.
 
RIGHT PLANTAR, LATERAL:  FULL THICKNESS ULCERATION MEASURING 2.1X1.8X2.4 WITH
TUNNELING AT 6 O'CLOCK 2.4CM.  YELLOW, MOIST, ADHERENT SLOUGH TO ENTIRETY OF
WOUND BED.  PROBES TO BONE.  WOUND NOW CONNENCTS WITH RIGHT PLANTAR MEDIAL
FOOT WOUND.  CLEANSED WITH WOUND CLEANSER, PATTED DRY.  PACKED WITH AQUACEL AG
 
RIGHT PLANTAR, MEDIAL:  FULL THICKNESS ULCERATION MEASURING 1X2X0.1. DRY,
YELLOW WOUND BED.  CLEANSED WITH WOUND CLEANSER, PATTED DRY.  NOW CONNENCTS
WITH LATERAL PLANTAR WOUND.  APPLIED AQUACEL AG OVER WOUND BED.
 
SECURED PLANTAR FOOT WOUNDS WITH ABD THEN KERLIX.
 
UPON WRAPPING PATIENT'S FOOT, NOTICED A DEEP TISSUE INJURY TO HIS RIGHT HEEL
MEASURING 4X4.5.  PURPLE/MAROON DISCOLORATION WITH BLISTERING.  NO OPENING
NOTED.  PAINTED WITH BETADINE, ALLOWED TO DRY.  WRAPPED WITH KERLIX.
 
EDUCATED PATIENT, SON, AND WIFE ON IMPORTANCE OF KEEPING HEELS UP OFF BED.
USED TWO PILLOWS AND APPROPRIATELY OFFLOADED BOTH HEELS.  DEMONSTRATED THE
APPROPRIATE WAY TO OFFLOAD HEELS, COMMUNICATED UNDERSTANDING.
 
RECOMMEND
KEEP HEELS OFF BED-PODUS BOOTS USED
ENCOURAGE GOOD NUTRITION/HYDRATION
DAILY DRESSING CHANGES

## 2018-08-07 NOTE — NUR
PT SLEPT FAIRLY WELL OVERNIGHT. DRESSING TO BLE CHANGED AND PICTURES TAKEN AT
HS. HS ACCUCHECK 226, INSULIN GIVEN WITH SNACK. TEMP DIALYSIS CATH TO R NECK,
DOWN TO DIALYSIS THIS MORNING. RFA IV SL, ABX GIVEN AS ORDERED. TO HAVE
CARDIAC ECHO TODAY. AM LAB DRAWN. PT REFUSING TURNS, EDUCATION GIVEN. ABLE TO
USE CALL LITE AND MAKE NEEDS KNOWN. VAUGHN WITH SCANT AMOUNT BLOODY URINE
OVERNIGHT.

## 2018-08-07 NOTE — 2DMMODE
[place] [address]
Phone:  [phone] 2 D/M-MODE ECHOCARDIOGRAM     
_______________________________________________________________________________
 
Name:         LIZET RODRIGUEZDDJUNI NAIDU            Room:          [address] [place] 
[facility]#:    [id number]     Account #:     [id number]  
Admission:    18    Attend Phys:   Fernando Abel, 
Discharge:                Date of Birth: [birth year]  
Date of Service: 18 1536  Report #:      [id number]
        [id number]
_______________________________________________________________________________
THIS REPORT FOR:  //name//                      
 
 
--------------- APPROVED REPORT --------------
 
 
Study performed:  2018 14:49:32
 
EXAM: Comprehensive 2D, Doppler, and color-flow 
Echocardiogram 
Patient Location: In-Patient   
Room #:  [facility]     Status:  routine
 
      BSA:         1.94
HR: 60 bpm BP:          109/58 mmHg 
Rhythm: NSR     
 
Other Information 
Study Quality: Good
 
Indications
Sepsis
 
2D Dimensions
   LVEF(%):  72.30 (&gt;50%)
IVSd:  11.72 (7-11mm) LVOT Diam:  20.60 (18-24mm) 
LVDd:  47.03 mm  
PWd:  11.78 (7-11mm) Ascending Ao:  29.28 (22-36mm)
LVDs:  27.55 (25-40mm) 
Aortic Root:  31.78 mm 
   Julien's LVEF:  72.30 %
 
Volumes
Left Atrial Volume (Systole) 
    LA ESV Index:  44.20 mL/m2
 
Aortic Valve
AoV Peak Nayan.:  1.55 m/s 
AO Peak Gr.:  9.67 mmHg  LVOT Max P.80 mmHg
AO Mean Gr.:  5.56 mmHg  LVOT Mean P.78 mmHg
    LVOT Max V:  1.20 m/s
AO V2 VTI:  26.92 cm  LVOT Mean V:  0.77 m/s
DUNCAN (VTI):  2.64 cm2  LVOT V1 VTI:  21.30 cm
 
TDI
 Medial E' Nayan.:  0.07 m/s
 
 
[place] [address]
Phone:  [phone]                     2 D/M-MODE ECHOCARDIOGRAM     
_______________________________________________________________________________
 
Name:         YAKOV RODRIGUEZ            Room:          [address] [place] 
[facility].#:    [id number]     Account #:     [id number]  
Admission:    18    Attend Phys:   Fernando Abel, 
Discharge:                Date of Birth: [birth year]  
Date of Service: 18 1536  Report #:      [id number]
        [id number]
_______________________________________________________________________________
 Lateral E' Nayan.:  0.09 m/s
 
Pulmonary Valve
PV Peak Nayan.:  0.88 m/s PV Peak Gr.:  3.10 mmHg
 
Tricuspid Valve
    RAP Estimate:  5.00 mmHg
TR Peak Gr.:  53.06 mmHg RVSP:  58.06 mmHg
    PA Pressure:  58.06 mmHg
 
Left Ventricle
The left ventricle is normal size. There is mild global hypokinesis 
of the left ventricle. There is normal left ventricular wall 
thickness. Left ventricular systolic function is mildly decreased. 
LVEF is 45%.
 
Right Ventricle
The right ventricle is normal size. The right ventricular systolic 
function is normal. Pacemaker lead is present in the right ventricle. 
 
Atria
Left atrium is moderately dilated. The right atrium size is 
normal.
 
Aortic Valve
Moderate aortic valve sclerosis. Bioprosthetic aortic valve is 
present. No aortic regurgitation is present. No hemodynamically 
significant valvular aortic stenosis.
 
Mitral Valve
Severe mitral annular calcification. Mild mitral regurgitation. No 
evidence of mitral valve stenosis.
 
Tricuspid Valve
The tricuspid valve is normal in structure. Mild tricuspid 
regurgitation. Moderate pulmonary hypertension.
 
Pulmonic Valve
The pulmonary valve is normal in structure. There is no pulmonic 
valvular regurgitation.
 
Great Vessels
The aortic root is normal in size. IVC is normal in size and 
collapses with &gt;50% inspiration
 
Pericardium
 
 
[place] [address]
Phone:  [phone]                     2 D/M-MODE ECHOCARDIOGRAM     
_______________________________________________________________________________
 
Name:         YAKOV RODRIGUEZ            Room:          [address]#:    [id number]     Account #:     [id number]  
Admission:    18    Attend Phys:   Fernando Abel, 
Discharge:                Date of Birth: [birth year]  
Date of Service: 18 1536  Report #:      [id number]
        [id number]
_______________________________________________________________________________
There is no pericardial effusion.
 
&lt;Conclusion&gt;
The left ventricle is normal size.
There is normal left ventricular wall thickness.
Left ventricular systolic function is mildly decreased.
LVEF is 45%.
The right ventricle is normal size.
Left atrium is moderately dilated.
Moderate aortic valve sclerosis.
No aortic regurgitation is present.
No hemodynamically significant valvular aortic stenosis.
Severe mitral annular calcification.
Mild mitral regurgitation.
No evidence of mitral valve stenosis.
The tricuspid valve is normal in structure.
Mild tricuspid regurgitation.
Moderate pulmonary hypertension.
IVC is normal in size and collapses with &gt;50% inspiration
There is no pericardial effusion.
There is mild global hypokinesis of the left ventricle.
Bioprosthetic aortic valve is present.
 
 
 
 
 
 
 
 
 
 
 
 
 
 
 
 
 
 
 
 
 
 
  <ELECTRONICALLY SIGNED>
                                           By: Fredi Simmons MD, FACC     
  18     1536
D: 18 1536   _____________________________________
T: 18 1536   Fredi Simmons MD, FACC       /INF

## 2018-08-07 NOTE — NUR
Received order from physician to arrange outpatient dialysis at [facility].  Called [facility] and faxed referral.  Will await return call re:
acceptance and chair time.

## 2018-08-08 VITALS — SYSTOLIC BLOOD PRESSURE: 143 MMHG | DIASTOLIC BLOOD PRESSURE: 107 MMHG

## 2018-08-08 VITALS — SYSTOLIC BLOOD PRESSURE: 127 MMHG | DIASTOLIC BLOOD PRESSURE: 75 MMHG

## 2018-08-08 VITALS — SYSTOLIC BLOOD PRESSURE: 118 MMHG | DIASTOLIC BLOOD PRESSURE: 68 MMHG

## 2018-08-08 VITALS — SYSTOLIC BLOOD PRESSURE: 124 MMHG | DIASTOLIC BLOOD PRESSURE: 65 MMHG

## 2018-08-08 VITALS — DIASTOLIC BLOOD PRESSURE: 74 MMHG | SYSTOLIC BLOOD PRESSURE: 134 MMHG

## 2018-08-08 LAB
ABSOLUTE MONOCYTES: 0.1 THOU/UL (ref 0–1.2)
ALBUMIN SERPL-MCNC: 2.5 G/DL (ref 3.4–5)
ALP SERPL-CCNC: 75 U/L (ref 46–116)
ALT SERPL-CCNC: 6 U/L (ref 30–65)
ANION GAP SERPL CALC-SCNC: 6 MMOL/L (ref 7–16)
ANISOCYTOSIS BLD QL SMEAR: (no result)
AST SERPL-CCNC: 23 U/L (ref 15–37)
BILIRUB SERPL-MCNC: 0.5 MG/DL
BUN SERPL-MCNC: 44 MG/DL (ref 7–18)
CALCIUM SERPL-MCNC: 8.3 MG/DL (ref 8.5–10.1)
CHLORIDE SERPL-SCNC: 100 MMOL/L (ref 98–107)
CO2 SERPL-SCNC: 29 MMOL/L (ref 21–32)
CREAT SERPL-MCNC: 4.1 MG/DL (ref 0.6–1.3)
GLUCOSE SERPL-MCNC: 134 MG/DL (ref 70–99)
GRANULOCYTES NFR BLD MANUAL: 89 %
HCT VFR BLD CALC: 35.5 % (ref 42–52)
HGB BLD-MCNC: 11.7 GM/DL (ref 14–18)
LYMPHOCYTES # BLD: 1.4 THOU/UL (ref 0.8–5.3)
LYMPHOCYTES NFR BLD AUTO: 10 %
MCH RBC QN AUTO: 29.4 PG (ref 26–34)
MCHC RBC AUTO-ENTMCNC: 32.9 G/DL (ref 28–37)
MCV RBC: 89.6 FL (ref 80–100)
MONOCYTES NFR BLD: 1 %
MPV: 7.6 FL. (ref 7.2–11.1)
NEUTROPHILS # BLD: 12.5 THOU/UL (ref 1.6–8.1)
NUCLEATED RBCS: 0 /100WBC
PLATELET # BLD EST: ADEQUATE 10*3/UL
PLATELET COUNT*: 226 THOU/UL (ref 150–400)
POIKILOCYTOSIS BLD QL SMEAR: (no result)
POTASSIUM SERPL-SCNC: 4.9 MMOL/L (ref 3.5–5.1)
PREALB SERPL-MCNC: 17.9 MG/DL (ref 18–35.7)
PROT SERPL-MCNC: 6.1 G/DL (ref 6.4–8.2)
RBC # BLD AUTO: 3.96 MIL/UL (ref 4.5–6)
RDW-CV: 16.9 % (ref 10.5–14.5)
SODIUM SERPL-SCNC: 135 MMOL/L (ref 136–145)
WBC # BLD AUTO: 14 THOU/UL (ref 4–11)

## 2018-08-08 PROCEDURE — B5181ZA FLUOROSCOPY OF SUPERIOR VENA CAVA USING LOW OSMOLAR CONTRAST, GUIDANCE: ICD-10-PCS

## 2018-08-08 PROCEDURE — B548ZZA ULTRASONOGRAPHY OF SUPERIOR VENA CAVA, GUIDANCE: ICD-10-PCS

## 2018-08-08 PROCEDURE — 0JH63XZ INSERTION OF TUNNELED VASCULAR ACCESS DEVICE INTO CHEST SUBCUTANEOUS TISSUE AND FASCIA, PERCUTANEOUS APPROACH: ICD-10-PCS | Performed by: RADIOLOGY

## 2018-08-08 PROCEDURE — 02HV33Z INSERTION OF INFUSION DEVICE INTO SUPERIOR VENA CAVA, PERCUTANEOUS APPROACH: ICD-10-PCS

## 2018-08-08 NOTE — NUR
PATIENT RESTING IN BED. PATIENT DENIES ANY PAIN. PATIENT HAS POOR APPETITE AND
REFUSED MEALS. PATIENT ENCOURAGED TO DRINK BOOST WITH LITTLE INTAKE, PATIENT
IS DRINKING WATER. PATIENT WAS UP TO CHAIR THIS AFTERNOON. PATIENT IS UP WAS
MAX ASSIST OF 2 WITH GAIT BELT AND WALKER. PATIENT HAD NEW TUNNELED DIALYSIS
LINE PLACED THIS AM WITHOUT INCIDENT. PATIENT HAS VAUGHN CATHETER IN PLACE,
ORDERED TO REMOVE BUT PATIENT REFUSED AND WANTED TO TALK TO DOCTOR PRIOR TO
REMOVAL. PATIENT DENIES ANY NEEDS AT THIS TIME. CALL LIGHT WITHIN REACH. WILL
CONTINUE TO MONITOR.

## 2018-08-08 NOTE — NUR
PT SLEPT WELL OVERNIGHT. RFA SL ABX GIVEN AS ORDERED. HS ACCUCHECK 173,
INSULIN GIVEN. SKIN CARE BOOTS ON BLE. DRSG CDI RLE. TUBIGRIP IN PLACE LLE.
VAUGHN WITH SCANT AMOUNT BLOODY DRAINAGE PRESENT IN BAG. HS ACCUCHECK 173,
INSULIN GIVEN AS OREDERED. NPO AFTER MIDNIGHT, POSSIBLY HAVING DIALYSIS CATH
PLACE TOMORROW IN IR. ENCOURAGED TO TURN AND REPOSITION Q2 HOURS AND PRN FOR
SKIN COMFORT, RELUCTANT TO TURN, REPOSITIONED WITH PILLOW AS PT WOULD ALLOW.
AM LABS DRAWN.ABLE TO USE CALL LITE AND MAKE NEEDS KNOWN.

## 2018-08-09 VITALS — DIASTOLIC BLOOD PRESSURE: 80 MMHG | SYSTOLIC BLOOD PRESSURE: 147 MMHG

## 2018-08-09 VITALS — DIASTOLIC BLOOD PRESSURE: 80 MMHG | SYSTOLIC BLOOD PRESSURE: 128 MMHG

## 2018-08-09 VITALS — SYSTOLIC BLOOD PRESSURE: 135 MMHG | DIASTOLIC BLOOD PRESSURE: 61 MMHG

## 2018-08-09 VITALS — DIASTOLIC BLOOD PRESSURE: 77 MMHG | SYSTOLIC BLOOD PRESSURE: 131 MMHG

## 2018-08-09 VITALS — SYSTOLIC BLOOD PRESSURE: 146 MMHG | DIASTOLIC BLOOD PRESSURE: 69 MMHG

## 2018-08-09 VITALS — SYSTOLIC BLOOD PRESSURE: 157 MMHG | DIASTOLIC BLOOD PRESSURE: 81 MMHG

## 2018-08-09 LAB
ALBUMIN SERPL-MCNC: 2.8 G/DL (ref 3.4–5)
ANION GAP SERPL CALC-SCNC: 10 MMOL/L (ref 7–16)
BUN SERPL-MCNC: 65 MG/DL (ref 7–18)
CALCIUM SERPL-MCNC: 8.3 MG/DL (ref 8.5–10.1)
CHLORIDE SERPL-SCNC: 98 MMOL/L (ref 98–107)
CO2 SERPL-SCNC: 26 MMOL/L (ref 21–32)
CREAT SERPL-MCNC: 5.2 MG/DL (ref 0.6–1.3)
GLUCOSE SERPL-MCNC: 189 MG/DL (ref 70–99)
PHOSPHATE SERPL-MCNC: 7.3 MG/DL (ref 2.5–4.9)
POTASSIUM SERPL-SCNC: 5.1 MMOL/L (ref 3.5–5.1)
SODIUM SERPL-SCNC: 134 MMOL/L (ref 136–145)

## 2018-08-09 PROCEDURE — 5A1D70Z PERFORMANCE OF URINARY FILTRATION, INTERMITTENT, LESS THAN 6 HOURS PER DAY: ICD-10-PCS | Performed by: INTERNAL MEDICINE

## 2018-08-09 NOTE — NUR
PATIENT RESTING IN BED. PATIENT HAD DIALYSIS THIS AM. PATIENT RETURNED FROM
DIALYSIS THIS AFTERNOON. PATIENT WORKED WITH PHYSICAL THERAPY. PATIENT IS UP
MAX ASSIST OF 2 WITH GAIT BELT OR LIFT. PATIENT WAS UP TO CHAIR MOST OF
AFTERNOON.  PATIENT HAS POOR APPETITE, REFUSED ENSURE BUT WILL EAT ICE CREAM.
REHAB CONSULT RECEIVED TODAY PER FAMILY REQUEST. PATIENT DENIES ANY NEEDS AT
THIS TIME. CALL LIGHT WITHIN REACH. WILL CONTINUE TO MONITOR.

## 2018-08-09 NOTE — NUR
MARIO met with pt and pt dtr Mahsa whose number is [phone].  Pt and pt
family want to consider inpt rehab option.  MARIO discussed the consult was
entered today and that SW will update pt/family on dc planning and continue to
follow.  Pt dtr also hopes that when pt is ready to dc home from hospital or
rehab, they would prefer dialysis chair time of MWF afternoons.

## 2018-08-09 NOTE — NUR
ASSESSMENT COMPLETE. PT SLEPT MOST OF THE NIGHT. PT DENIES PAIN AND N/V.
DRESSINGS TO WOUNDS CHANGED TONIGHT. IV CEFAZOLIN GIVEN AS ORDERED. NEW
DIALYSIS CATH IN PLACE, SMALL AMOUNT OF RED BLOOD IN DRESSING. PT IS Q2 TURN
FOR SKIN INTEGRITY. VAUGHN IN PLACE WITH VERY LITTLE OUTPUT, DARK RED. PT
WANTED TO GET TO BS FOR BM, MAX ASSIST WITH WALKER AND GAIT BELT ATTEMPTED
AND PT UNABLE TO STAND AND AMBULATE. BEDPAN USED. VITALS STABLE. SEE
ASSESSMENT AND VITALS FOR OTHER DETAILS. CALL LIGHT WITHIN REACH, WILL
CONTINUE PLAN OF CARE

## 2018-08-09 NOTE — NUR
RECEIVED CONSULT FOR POSSIBLE REHAB ADMISSION. CONSULT HAS BEEN ACKNOWLEDGED
BY REHAB LIAISON AND DR. SANCHEZ. PATIENT ADMITTED WITH AMS, NONHEALING DM FOOT
ULCER AND R LE CELLULITIS. PT WITH SEPSIS AND ENCEPHALOPATHY. OT AND ST
EVALUATIONS ARE PENDING. PATIENT WAS MAX A X2 WITH PT TODAY FOR TRANSFER. ALSO
ON HD. WILL FOLLOW ALONG WITH PATIENT TO SEE HOW HE PROGRESSES AND IF ABLE TO
TOLERATE 3 HOURS OF THERAPIES PENDING EVALUATIONS AND CONTINUED THERAPIES.
THANK YOU FOR THIS CONSULT.

## 2018-08-10 VITALS — DIASTOLIC BLOOD PRESSURE: 59 MMHG | SYSTOLIC BLOOD PRESSURE: 139 MMHG

## 2018-08-10 VITALS — DIASTOLIC BLOOD PRESSURE: 79 MMHG | SYSTOLIC BLOOD PRESSURE: 154 MMHG

## 2018-08-10 NOTE — NUR
PATIENT WORKED WITH PT/OT THIS SHIFT. UP TO EGDE OF BED. IV REMOVED AND PO ABX
STARTED. NO URINE OUTPUT THIS SHIFT. PATIENT TO HAVE DIALYSIS TOMORROW AM,
FAMILY AWARE. SPOKE WITH WOUND CARE REGARDING FUTURE POSSIBLE HYPERBARIC
TREATMENTS, DR. LOUIS NOTIFIED TO SPEAK WITH DR. GAMEZ REGARDING. WOUND
DRESSING CHANGED TO RIGHT FOOT AS ORDERED. FAMILY AT BEDSIDE MOST OF THE DAY.

## 2018-08-10 NOTE — NUR
ASSESSMENT COMPLETE. PT SLEPT THROUGH THE NIGHT WITHOUT ANY CONCERNS. PT
DENIES PAIN AND N/V. PT IS ON ROOM AIR WITH ADEQUATE SATS. PT TURNED Q2 FOR
SKIN INTEGRITY. NO URINE OUTPUT DURING THE NIGHT. DRESSING TO BLE DRY AND
INTACT. IV SALINE LOCKED IN RIGHT FOREARM, FLUSHES WITHOUT DIFFICULTY. PT IS
MAX ASSIST WITH IRIS. SEE ASSESSMENT AND VITALS FOR OTHER DETAILS. CALL LIGHT
WITHIN REACH, WILL CONTINUE PLAN OF CARE

## 2018-08-11 VITALS — SYSTOLIC BLOOD PRESSURE: 154 MMHG | DIASTOLIC BLOOD PRESSURE: 67 MMHG

## 2018-08-11 VITALS — SYSTOLIC BLOOD PRESSURE: 146 MMHG | DIASTOLIC BLOOD PRESSURE: 59 MMHG

## 2018-08-11 VITALS — SYSTOLIC BLOOD PRESSURE: 142 MMHG | DIASTOLIC BLOOD PRESSURE: 63 MMHG

## 2018-08-11 VITALS — DIASTOLIC BLOOD PRESSURE: 54 MMHG | SYSTOLIC BLOOD PRESSURE: 122 MMHG

## 2018-08-11 LAB
ABSOLUTE MONOCYTES: 1.4 THOU/UL (ref 0–1.2)
ANION GAP SERPL CALC-SCNC: 8 MMOL/L (ref 7–16)
ANISOCYTOSIS BLD QL SMEAR: (no result)
BUN SERPL-MCNC: 58 MG/DL (ref 7–18)
CALCIUM SERPL-MCNC: 8.4 MG/DL (ref 8.5–10.1)
CHLORIDE SERPL-SCNC: 99 MMOL/L (ref 98–107)
CO2 SERPL-SCNC: 27 MMOL/L (ref 21–32)
CREAT SERPL-MCNC: 5.6 MG/DL (ref 0.6–1.3)
GLUCOSE SERPL-MCNC: 156 MG/DL (ref 70–99)
GRANULOCYTES NFR BLD MANUAL: 91 %
HCT VFR BLD CALC: 35.5 % (ref 42–52)
HGB BLD-MCNC: 11.4 GM/DL (ref 14–18)
LYMPHOCYTES # BLD: 0.7 THOU/UL (ref 0.8–5.3)
LYMPHOCYTES NFR BLD AUTO: 3 %
MCH RBC QN AUTO: 28.7 PG (ref 26–34)
MCHC RBC AUTO-ENTMCNC: 32.3 G/DL (ref 28–37)
MCV RBC: 88.9 FL (ref 80–100)
MONOCYTES NFR BLD: 6 %
MPV: 8.4 FL. (ref 7.2–11.1)
NEUTROPHILS # BLD: 20.8 THOU/UL (ref 1.6–8.1)
NUCLEATED RBCS: 0 /100WBC
PLATELET # BLD EST: (no result) 10*3/UL
PLATELET COUNT*: 144 THOU/UL (ref 150–400)
POIKILOCYTOSIS BLD QL SMEAR: (no result)
POTASSIUM SERPL-SCNC: 4.4 MMOL/L (ref 3.5–5.1)
RBC # BLD AUTO: 3.99 MIL/UL (ref 4.5–6)
RDW-CV: 17.2 % (ref 10.5–14.5)
SODIUM SERPL-SCNC: 134 MMOL/L (ref 136–145)
WBC # BLD AUTO: 22.9 THOU/UL (ref 4–11)

## 2018-08-11 PROCEDURE — 5A1D70Z PERFORMANCE OF URINARY FILTRATION, INTERMITTENT, LESS THAN 6 HOURS PER DAY: ICD-10-PCS | Performed by: INTERNAL MEDICINE

## 2018-08-11 NOTE — NUR
PATIENT RESTING UP IN RECLINER. PATIENT DENIES ANY PAIN. PATIENT HAS WORKED
WITH PHYSICAL THERAPY THIS AFTERNOON. PATIENT HAD DIALYSIS THIS AM WITHOUT
INCIDENT. PATIENT HAS POOR APPETITE, SUPPLEMENTS GIVEN. PATIENT HAS SLEPT MOST
OF DAY. FAMILY AT BEDSIDE. CALL LIGHT WITHIN REACH. WILL CONTINUE TO MONITOR.

## 2018-08-11 NOTE — NUR
PATIENT SLEPT MOST OF THE NIGHT. PATIENT HAS NO COMPLAINTS OF PAIN. PATIENT IS
TO HAVE DIALYSIS TODAY. WILL CONTINUE TO MONITOR.

## 2018-08-12 VITALS — SYSTOLIC BLOOD PRESSURE: 162 MMHG | DIASTOLIC BLOOD PRESSURE: 76 MMHG

## 2018-08-12 VITALS — DIASTOLIC BLOOD PRESSURE: 61 MMHG | SYSTOLIC BLOOD PRESSURE: 131 MMHG

## 2018-08-12 VITALS — SYSTOLIC BLOOD PRESSURE: 154 MMHG | DIASTOLIC BLOOD PRESSURE: 68 MMHG

## 2018-08-12 VITALS — SYSTOLIC BLOOD PRESSURE: 151 MMHG | DIASTOLIC BLOOD PRESSURE: 72 MMHG

## 2018-08-12 VITALS — SYSTOLIC BLOOD PRESSURE: 122 MMHG | DIASTOLIC BLOOD PRESSURE: 65 MMHG

## 2018-08-12 LAB
ABSOLUTE BASOPHILS: 0 THOU/UL (ref 0–0.2)
ABSOLUTE EOSINOPHILS: 0.1 THOU/UL (ref 0–0.7)
ABSOLUTE MONOCYTES: 1.3 THOU/UL (ref 0–1.2)
ANION GAP SERPL CALC-SCNC: 10 MMOL/L (ref 7–16)
BASOPHILS NFR BLD AUTO: 0.1 %
BUN SERPL-MCNC: 40 MG/DL (ref 7–18)
CALCIUM SERPL-MCNC: 8.2 MG/DL (ref 8.5–10.1)
CHLORIDE SERPL-SCNC: 99 MMOL/L (ref 98–107)
CO2 SERPL-SCNC: 29 MMOL/L (ref 21–32)
CREAT SERPL-MCNC: 4.4 MG/DL (ref 0.6–1.3)
EOSINOPHIL NFR BLD: 0.9 %
GLUCOSE SERPL-MCNC: 116 MG/DL (ref 70–99)
GRANULOCYTES NFR BLD MANUAL: 86.7 %
HCT VFR BLD CALC: 33.1 % (ref 42–52)
HGB BLD-MCNC: 11 GM/DL (ref 14–18)
LYMPHOCYTES # BLD: 0.7 THOU/UL (ref 0.8–5.3)
LYMPHOCYTES NFR BLD AUTO: 4.3 %
MCH RBC QN AUTO: 29.2 PG (ref 26–34)
MCHC RBC AUTO-ENTMCNC: 33.2 G/DL (ref 28–37)
MCV RBC: 88 FL (ref 80–100)
MONOCYTES NFR BLD: 8 %
MPV: 8.1 FL. (ref 7.2–11.1)
NEUTROPHILS # BLD: 14.4 THOU/UL (ref 1.6–8.1)
NUCLEATED RBCS: 0 /100WBC
PLATELET COUNT*: 158 THOU/UL (ref 150–400)
POTASSIUM SERPL-SCNC: 3.9 MMOL/L (ref 3.5–5.1)
RBC # BLD AUTO: 3.76 MIL/UL (ref 4.5–6)
RDW-CV: 17.6 % (ref 10.5–14.5)
SODIUM SERPL-SCNC: 138 MMOL/L (ref 136–145)
WBC # BLD AUTO: 16.6 THOU/UL (ref 4–11)

## 2018-08-12 NOTE — NUR
PATIENT RESTING IN BED. PATIENT DENIES ANY PAIN. PATIENT WAS UP TO COMMODE X 1
THIS AM. PATIENT IS UP WITH MAX ASSIST WITH 2 AND GAIT BELT. PATIENT
REFUSED TO SIT UP IN RECLINER. PATIENT REFUSES TURNS IN BED.
WOUND CARE COMPLETED TO RIGHT FOOT AND LEG AND BUTTOCK. PATIENT HAS POOR
APPETITE. PATIENT DENIES ANY NEEDS AT THIS TIME. CALL LIGHT WITHIN REACH. WILL
CONTINUE TO MONITOR.

## 2018-08-12 NOTE — NUR
PATIENT SLEPT MOST OF THE NIGHT. PATIENT HAD NO COMPLAINTS OF PAIN. PATIENT
WAS TURNED ABOUT EVERY TWO HOURS. DRESSING REMAIN TO RIGHT FOOT WITH PODUS
BOOTS IN PLACE BILATERALLY. WILL CONTINUE TO MONITOR.

## 2018-08-13 VITALS — SYSTOLIC BLOOD PRESSURE: 128 MMHG | DIASTOLIC BLOOD PRESSURE: 65 MMHG

## 2018-08-13 VITALS — DIASTOLIC BLOOD PRESSURE: 66 MMHG | SYSTOLIC BLOOD PRESSURE: 132 MMHG

## 2018-08-13 VITALS — SYSTOLIC BLOOD PRESSURE: 146 MMHG | DIASTOLIC BLOOD PRESSURE: 69 MMHG

## 2018-08-13 VITALS — SYSTOLIC BLOOD PRESSURE: 118 MMHG | DIASTOLIC BLOOD PRESSURE: 60 MMHG

## 2018-08-13 LAB
ABSOLUTE BASOPHILS: 0 THOU/UL (ref 0–0.2)
ABSOLUTE EOSINOPHILS: 0.3 THOU/UL (ref 0–0.7)
ABSOLUTE MONOCYTES: 1.1 THOU/UL (ref 0–1.2)
ALBUMIN SERPL-MCNC: 2.1 G/DL (ref 3.4–5)
ALP SERPL-CCNC: 78 U/L (ref 46–116)
ALT SERPL-CCNC: 6 U/L (ref 30–65)
ANION GAP SERPL CALC-SCNC: 6 MMOL/L (ref 7–16)
AST SERPL-CCNC: 27 U/L (ref 15–37)
BASOPHILS NFR BLD AUTO: 0.3 %
BILIRUB SERPL-MCNC: 0.8 MG/DL
BUN SERPL-MCNC: 62 MG/DL (ref 7–18)
CALCIUM SERPL-MCNC: 8.3 MG/DL (ref 8.5–10.1)
CHLORIDE SERPL-SCNC: 98 MMOL/L (ref 98–107)
CO2 SERPL-SCNC: 31 MMOL/L (ref 21–32)
CREAT SERPL-MCNC: 5.8 MG/DL (ref 0.6–1.3)
EOSINOPHIL NFR BLD: 2.3 %
GLUCOSE SERPL-MCNC: 134 MG/DL (ref 70–99)
GRANULOCYTES NFR BLD MANUAL: 82.1 %
HCT VFR BLD CALC: 33.3 % (ref 42–52)
HGB BLD-MCNC: 11.1 GM/DL (ref 14–18)
LYMPHOCYTES # BLD: 0.9 THOU/UL (ref 0.8–5.3)
LYMPHOCYTES NFR BLD AUTO: 6.7 %
MCH RBC QN AUTO: 29 PG (ref 26–34)
MCHC RBC AUTO-ENTMCNC: 33.2 G/DL (ref 28–37)
MCV RBC: 87.5 FL (ref 80–100)
MONOCYTES NFR BLD: 8.6 %
MPV: 7.7 FL. (ref 7.2–11.1)
NEUTROPHILS # BLD: 10.5 THOU/UL (ref 1.6–8.1)
NUCLEATED RBCS: 0 /100WBC
PLATELET COUNT*: 197 THOU/UL (ref 150–400)
POTASSIUM SERPL-SCNC: 4.4 MMOL/L (ref 3.5–5.1)
PROT SERPL-MCNC: 5.8 G/DL (ref 6.4–8.2)
RBC # BLD AUTO: 3.81 MIL/UL (ref 4.5–6)
RDW-CV: 17.5 % (ref 10.5–14.5)
SODIUM SERPL-SCNC: 135 MMOL/L (ref 136–145)
WBC # BLD AUTO: 12.7 THOU/UL (ref 4–11)

## 2018-08-13 NOTE — NUR
PT SLEPT FAIRLY WELL OVERNIGHT. REFUSING TURNS, EDUCATION GIVEN. PT AO,
FORGETFUL. CAN BE FUSSY WHEN CARES GIVEN. NO IV. HS ACCUCHECK 190, INSULIN
GIVEN PER SS. TELE V PACED. VSS. AM LABS DRAWN. DRSG CDI TO RLE, PODUS BOOTS
ON BLE. USING BED PAN AT START OF SHIFT, SMALL BM AND SCANT AMOUNT DARK BROWN
URINE. DENIES PAIN. R CHEST TESSIO INTACT. TAKING PILLS WHOLE WITH WATER. ABLE
TO USE CALL LITE AND MAKE NEEDS KNOWN. REFUSING HS SNACK.

## 2018-08-13 NOTE — NUR
CONTINUING TO FOLLOW PATIENT ALONG WITH DR. SANCHEZ. PATIENT FREQUENTLY REFUSES
THERAPIES OR REQUIRES MUCH ENCOURAGEMENT TO PARTICIAPATE. IT IS UNLIKELY THAT
PATIENT COULD TOLERATE 3 HOURS OF THERAPY OR WOULD PARTICIPATE. AT THIS TIME
PATIENT WOULD BENEFIT FROM SKILLED FOR CONTINUED THERAPIES. SPOKE WITH YOGESH PARKER AND NOTIFIED HER OF RECOMMENDATION.

## 2018-08-13 NOTE — NUR
CM SPOKE TO THE PATIENT AND FAMILY TO DISCUSS DISCHARGE PLANNING NEEDS AND
SKILLED AT D/C. PATIENT'S SPOUSE INFORMS THAT SHE WOULD LIKE A REFERRAL SENT
TO [facility]. CM SPOKE TO ANNIE AT [facility] TO INFORM OF THE REFERRAL FOR
SKILLED AND FAXED PATIENTS CLINICAL INFO. CM WILL REMAIN AVAILABLE TO ASSIST
AND FOLLOW AS NEEDED.

## 2018-08-13 NOTE — NUR
PATIENT DISCHARGED TO [facility] REPORT CALLED. COPY OF CHART AND
DISCHARGE ORDERS SENT WITH TRANSPORTER. PATIENT ASSISTED TO WHEELCHAIR WITH
MAX ASSIST OF 2. BELONGINGS PACKED BY FAMILY. NO IV. DIALYSIS CATHIN PLACE.
PATIENT DENIES ANY FURTHER NEED. PATIENT TAKEN BY WHEELCHAIR VAN AT THIS TIME.

## 2018-08-15 ENCOUNTER — HOSPITAL ENCOUNTER (OUTPATIENT)
Dept: HOSPITAL 96 - M.WC | Age: 83
End: 2018-08-15
Attending: SURGERY
Payer: MEDICARE

## 2018-08-15 DIAGNOSIS — E78.5: ICD-10-CM

## 2018-08-15 DIAGNOSIS — L97.511: ICD-10-CM

## 2018-08-15 DIAGNOSIS — Z89.422: ICD-10-CM

## 2018-08-15 DIAGNOSIS — I70.235: ICD-10-CM

## 2018-08-15 DIAGNOSIS — L97.421: ICD-10-CM

## 2018-08-15 DIAGNOSIS — E11.42: ICD-10-CM

## 2018-08-15 DIAGNOSIS — I11.0: ICD-10-CM

## 2018-08-15 DIAGNOSIS — E11.621: Primary | ICD-10-CM

## 2018-08-15 DIAGNOSIS — Z89.421: ICD-10-CM

## 2018-08-15 DIAGNOSIS — Z89.411: ICD-10-CM

## 2018-08-15 DIAGNOSIS — I25.10: ICD-10-CM

## 2018-08-15 DIAGNOSIS — L89.610: ICD-10-CM

## 2018-08-15 DIAGNOSIS — I50.9: ICD-10-CM

## 2018-08-22 ENCOUNTER — HOSPITAL ENCOUNTER (INPATIENT)
Dept: HOSPITAL 96 - M.WC | Age: 83
LOS: 14 days | Discharge: NURSING, REHAB OR LONG-TERM CARE | DRG: 853 | End: 2018-09-05
Attending: INTERNAL MEDICINE | Admitting: INTERNAL MEDICINE
Payer: MEDICARE

## 2018-08-22 VITALS — SYSTOLIC BLOOD PRESSURE: 135 MMHG | DIASTOLIC BLOOD PRESSURE: 77 MMHG

## 2018-08-22 VITALS — WEIGHT: 130 LBS | BODY MASS INDEX: 17.61 KG/M2 | HEIGHT: 72.01 IN

## 2018-08-22 DIAGNOSIS — I70.268: ICD-10-CM

## 2018-08-22 DIAGNOSIS — I48.2: ICD-10-CM

## 2018-08-22 DIAGNOSIS — N18.6: ICD-10-CM

## 2018-08-22 DIAGNOSIS — Z91.040: ICD-10-CM

## 2018-08-22 DIAGNOSIS — Z88.0: ICD-10-CM

## 2018-08-22 DIAGNOSIS — E11.52: ICD-10-CM

## 2018-08-22 DIAGNOSIS — I25.2: ICD-10-CM

## 2018-08-22 DIAGNOSIS — E78.5: ICD-10-CM

## 2018-08-22 DIAGNOSIS — Z87.891: ICD-10-CM

## 2018-08-22 DIAGNOSIS — F03.90: ICD-10-CM

## 2018-08-22 DIAGNOSIS — I42.9: ICD-10-CM

## 2018-08-22 DIAGNOSIS — M31.9: ICD-10-CM

## 2018-08-22 DIAGNOSIS — Z79.01: ICD-10-CM

## 2018-08-22 DIAGNOSIS — A41.9: Primary | ICD-10-CM

## 2018-08-22 DIAGNOSIS — A49.01: ICD-10-CM

## 2018-08-22 DIAGNOSIS — I13.2: ICD-10-CM

## 2018-08-22 DIAGNOSIS — Z95.0: ICD-10-CM

## 2018-08-22 DIAGNOSIS — D50.9: ICD-10-CM

## 2018-08-22 DIAGNOSIS — Z79.82: ICD-10-CM

## 2018-08-22 DIAGNOSIS — Z79.899: ICD-10-CM

## 2018-08-22 DIAGNOSIS — E43: ICD-10-CM

## 2018-08-22 DIAGNOSIS — D63.8: ICD-10-CM

## 2018-08-22 DIAGNOSIS — Z89.422: ICD-10-CM

## 2018-08-22 DIAGNOSIS — I25.10: ICD-10-CM

## 2018-08-22 DIAGNOSIS — Z95.2: ICD-10-CM

## 2018-08-22 DIAGNOSIS — R62.7: ICD-10-CM

## 2018-08-22 DIAGNOSIS — L03.115: ICD-10-CM

## 2018-08-22 DIAGNOSIS — E11.621: ICD-10-CM

## 2018-08-22 LAB
ABSOLUTE EOSINOPHILS: 0.4 THOU/UL (ref 0–0.7)
ABSOLUTE MONOCYTES: 0.5 THOU/UL (ref 0–1.2)
ALBUMIN SERPL-MCNC: 2.6 G/DL (ref 3.4–5)
ALP SERPL-CCNC: 84 U/L (ref 46–116)
ALT SERPL-CCNC: 15 U/L (ref 30–65)
ANION GAP SERPL CALC-SCNC: 3 MMOL/L (ref 7–16)
ANISOCYTOSIS BLD QL SMEAR: (no result)
AST SERPL-CCNC: 54 U/L (ref 15–37)
BILIRUB SERPL-MCNC: 0.8 MG/DL
BUN SERPL-MCNC: 29 MG/DL (ref 7–18)
CALCIUM SERPL-MCNC: 8.3 MG/DL (ref 8.5–10.1)
CHLORIDE SERPL-SCNC: 99 MMOL/L (ref 98–107)
CO2 SERPL-SCNC: 32 MMOL/L (ref 21–32)
CREAT SERPL-MCNC: 3.7 MG/DL (ref 0.6–1.3)
EOSINOPHIL NFR BLD: 4 %
GLUCOSE SERPL-MCNC: 122 MG/DL (ref 70–99)
GRANULOCYTES NFR BLD MANUAL: 81 %
HCT VFR BLD CALC: 31.7 % (ref 42–52)
HGB BLD-MCNC: 10.3 GM/DL (ref 14–18)
INR PPP: 1.7
LYMPHOCYTES # BLD: 0.8 THOU/UL (ref 0.8–5.3)
LYMPHOCYTES NFR BLD AUTO: 9 %
MCH RBC QN AUTO: 28.8 PG (ref 26–34)
MCHC RBC AUTO-ENTMCNC: 32.6 G/DL (ref 28–37)
MCV RBC: 88.1 FL (ref 80–100)
MONOCYTES NFR BLD: 6 %
MPV: 7.3 FL. (ref 7.2–11.1)
NEUTROPHILS # BLD: 7.2 THOU/UL (ref 1.6–8.1)
NUCLEATED RBCS: 0 /100WBC
PLATELET # BLD EST: ADEQUATE 10*3/UL
PLATELET COUNT*: 164 THOU/UL (ref 150–400)
POTASSIUM SERPL-SCNC: 4.7 MMOL/L (ref 3.5–5.1)
PROT SERPL-MCNC: 6.4 G/DL (ref 6.4–8.2)
PROTHROMBIN TIME: 16.9 SECONDS (ref 9.2–11.5)
RBC # BLD AUTO: 3.59 MIL/UL (ref 4.5–6)
RDW-CV: 17.8 % (ref 10.5–14.5)
SODIUM SERPL-SCNC: 134 MMOL/L (ref 136–145)
WBC # BLD AUTO: 8.9 THOU/UL (ref 4–11)

## 2018-08-22 NOTE — NUR
PATIENT ARRIVED FROM WOUND CARE CENTER THIS AFTERNOON. PATIENT SETTLED TO
ROOM. PATIENT IS UP MAX ASSIST 2-3 FOR TRANSFERS, NON-AMBULATORY. ARTERIAL
DOPPLER OF RIGHT LEG THIS AFTERNOON. WOUND CARE AND PICTURES COMPLETED.
PATIENT DENIES ANY PAIN. PATIENT HAS POOR APPETITE, ENSURE SUPPLEMENT GIVEN.
IV STARTED TO RIGHT FOREARM WITHOUT INCIDENT. PATIENT DENIES ANY NEEDS AT THIS
TIME. CALL LIGHT WITHIN REACH. BED ALARM ON. WILL CONTINUE TO MONITOR.

## 2018-08-23 VITALS — DIASTOLIC BLOOD PRESSURE: 65 MMHG | SYSTOLIC BLOOD PRESSURE: 133 MMHG

## 2018-08-23 VITALS — SYSTOLIC BLOOD PRESSURE: 126 MMHG | DIASTOLIC BLOOD PRESSURE: 61 MMHG

## 2018-08-23 VITALS — SYSTOLIC BLOOD PRESSURE: 121 MMHG | DIASTOLIC BLOOD PRESSURE: 70 MMHG

## 2018-08-23 LAB
ABSOLUTE BASOPHILS: 0.1 THOU/UL (ref 0–0.2)
ABSOLUTE EOSINOPHILS: 0.2 THOU/UL (ref 0–0.7)
ABSOLUTE MONOCYTES: 0.9 THOU/UL (ref 0–1.2)
ANION GAP SERPL CALC-SCNC: 4 MMOL/L (ref 7–16)
BASOPHILS NFR BLD AUTO: 0.9 %
BUN SERPL-MCNC: 36 MG/DL (ref 7–18)
CALCIUM SERPL-MCNC: 8.1 MG/DL (ref 8.5–10.1)
CHLORIDE SERPL-SCNC: 101 MMOL/L (ref 98–107)
CO2 SERPL-SCNC: 31 MMOL/L (ref 21–32)
CREAT SERPL-MCNC: 4.1 MG/DL (ref 0.6–1.3)
EOSINOPHIL NFR BLD: 2.3 %
GLUCOSE SERPL-MCNC: 87 MG/DL (ref 70–99)
GRANULOCYTES NFR BLD MANUAL: 74.1 %
HCT VFR BLD CALC: 27.9 % (ref 42–52)
HGB BLD-MCNC: 9.3 GM/DL (ref 14–18)
LYMPHOCYTES # BLD: 1 THOU/UL (ref 0.8–5.3)
LYMPHOCYTES NFR BLD AUTO: 12.1 %
MCH RBC QN AUTO: 29.4 PG (ref 26–34)
MCHC RBC AUTO-ENTMCNC: 33.4 G/DL (ref 28–37)
MCV RBC: 88.1 FL (ref 80–100)
MONOCYTES NFR BLD: 10.6 %
MPV: 7.1 FL. (ref 7.2–11.1)
NEUTROPHILS # BLD: 6.1 THOU/UL (ref 1.6–8.1)
NUCLEATED RBCS: 0 /100WBC
PLATELET COUNT*: 146 THOU/UL (ref 150–400)
POTASSIUM SERPL-SCNC: 5.2 MMOL/L (ref 3.5–5.1)
RBC # BLD AUTO: 3.17 MIL/UL (ref 4.5–6)
RDW-CV: 17.5 % (ref 10.5–14.5)
SODIUM SERPL-SCNC: 136 MMOL/L (ref 136–145)
WBC # BLD AUTO: 8.2 THOU/UL (ref 4–11)

## 2018-08-23 PROCEDURE — 5A1D70Z PERFORMANCE OF URINARY FILTRATION, INTERMITTENT, LESS THAN 6 HOURS PER DAY: ICD-10-PCS | Performed by: INTERNAL MEDICINE

## 2018-08-23 NOTE — NUR
PT SLEPT ON AND OFF OVERNIGHT. VOICING FRUSTRATION STATING "I DONT KNOW WHY IM
STAYING HERE AGAIN. THIS IS THE D____ THING ROSE EVER SEEN. I JUST CAME FOR
DIALYSIS." EDUCATION GIVEN ABOUT FOOT WOUND AND NEED FOR ANTIBIOTICS BUT PT
SKEPTICAL. DIALYSIS TODAY. PT REFUSING TO CHANGE OUT OF HOME CLOTHES THIS
SHIFT. HS ACCUCHECK 81, SNACK GIVEN. AM LABS DRAWN. R CHEST TESSIO INPLACE.
RFA SL, ABX GIVEN AS ORDERED. TURNED Q2 HOURS AND PRN AS PT WOULD ALLOW.
SPECIAL BOOTS TO BLE FOR SKIN INTEGRITY. ABLE TO USE CALL LITE AND MAKE NEEDS
KNOWN.

## 2018-08-23 NOTE — NUR
PATIENT RESTING IN BED. PATIENT IS UP MAX ASSIST OF 2 FOR TRANSFERS. MARY PLUMMER UP TO CHAIR THIS AM. MARY HERNANDEZD DIALYSIS THIS AFTERNOON WITHOUT
INCIDENT. MARY RAMIREZ POOR APPETITE, ENSURE AND ICE CREAM GIVEN. PATIENT AND
FAMILY DISCUSSED AMPUTATION WITH VASCULAR. PATIENT DENIES ANY NEEDS AT THIS.
CALL LIGHT WITHIN REACH. WILL CONTINUE TO MONITOR.

## 2018-08-23 NOTE — NUR
CM SPOKE TO THE PATIENT, SPOUSE, AND SON TO DISCUSS HOME SITUATION, DISCHARGE
PLANNING, AND TO INFORM OF THE ROLE OF CM. PATIENT RESTING WITH EYES CLOSED
DURRING ASSESSMENT. PATIENT'S SPOUSE AND SON ANSWERING ALL QUESTIONS. PATIENT
WAS ADMITTED FROM [facility], AND TENITIVELY PLAN TO RETURN TO [facility]
SNF A D/C TO CONTINUE THERAPIES. CM SPOKE TO ANNIE WITH [facility] ADMISSIONS AND SHE
CONFIRMS THAT THE PATIENT IS FROM THE SKILLED UNIT AND ARE ABLE TO ACCEP THE
PATIENT AT D/C. CM WILL REMAIN AVAILABLE TO ASSIST AND FOLLOW AS NEEDED.

## 2018-08-24 VITALS — SYSTOLIC BLOOD PRESSURE: 130 MMHG | DIASTOLIC BLOOD PRESSURE: 73 MMHG

## 2018-08-24 VITALS — SYSTOLIC BLOOD PRESSURE: 132 MMHG | DIASTOLIC BLOOD PRESSURE: 55 MMHG

## 2018-08-24 VITALS — DIASTOLIC BLOOD PRESSURE: 67 MMHG | SYSTOLIC BLOOD PRESSURE: 145 MMHG

## 2018-08-24 VITALS — SYSTOLIC BLOOD PRESSURE: 140 MMHG | DIASTOLIC BLOOD PRESSURE: 70 MMHG

## 2018-08-24 LAB
ABSOLUTE BASOPHILS: 0.1 THOU/UL (ref 0–0.2)
ABSOLUTE EOSINOPHILS: 0.2 THOU/UL (ref 0–0.7)
ABSOLUTE MONOCYTES: 0.9 THOU/UL (ref 0–1.2)
ALBUMIN SERPL-MCNC: 2.3 G/DL (ref 3.4–5)
ALP SERPL-CCNC: 69 U/L (ref 46–116)
ALT SERPL-CCNC: 14 U/L (ref 30–65)
ANION GAP SERPL CALC-SCNC: 3 MMOL/L (ref 7–16)
AST SERPL-CCNC: 42 U/L (ref 15–37)
BASOPHILS NFR BLD AUTO: 0.9 %
BILIRUB SERPL-MCNC: 0.9 MG/DL
BUN SERPL-MCNC: 22 MG/DL (ref 7–18)
CALCIUM SERPL-MCNC: 8 MG/DL (ref 8.5–10.1)
CHLORIDE SERPL-SCNC: 101 MMOL/L (ref 98–107)
CO2 SERPL-SCNC: 33 MMOL/L (ref 21–32)
CREAT SERPL-MCNC: 3 MG/DL (ref 0.6–1.3)
EOSINOPHIL NFR BLD: 2.6 %
GLUCOSE SERPL-MCNC: 73 MG/DL (ref 70–99)
GRANULOCYTES NFR BLD MANUAL: 73.1 %
HCT VFR BLD CALC: 29 % (ref 42–52)
HGB BLD-MCNC: 9.6 GM/DL (ref 14–18)
INR PPP: 2.4
LYMPHOCYTES # BLD: 0.9 THOU/UL (ref 0.8–5.3)
LYMPHOCYTES NFR BLD AUTO: 11.9 %
MCH RBC QN AUTO: 29 PG (ref 26–34)
MCHC RBC AUTO-ENTMCNC: 33 G/DL (ref 28–37)
MCV RBC: 87.8 FL (ref 80–100)
MONOCYTES NFR BLD: 11.5 %
MPV: 7.5 FL. (ref 7.2–11.1)
NEUTROPHILS # BLD: 5.4 THOU/UL (ref 1.6–8.1)
NUCLEATED RBCS: 0 /100WBC
PLATELET COUNT*: 166 THOU/UL (ref 150–400)
POTASSIUM SERPL-SCNC: 5.1 MMOL/L (ref 3.5–5.1)
PROT SERPL-MCNC: 5.6 G/DL (ref 6.4–8.2)
PROTHROMBIN TIME: 23.4 SECONDS (ref 9.2–11.5)
RBC # BLD AUTO: 3.3 MIL/UL (ref 4.5–6)
RDW-CV: 18.1 % (ref 10.5–14.5)
SODIUM SERPL-SCNC: 137 MMOL/L (ref 136–145)
WBC # BLD AUTO: 7.4 THOU/UL (ref 4–11)

## 2018-08-24 NOTE — NUR
ASSUMED PATIENT CARE AT 2330. PATIENT SLEEPING AT THIS TIME. BED ALARM IN
PLACE. CALL LIGHT WITHIN REACH. NURSING WILL CONTINUE TO MONITOR.

## 2018-08-24 NOTE — NUR
PATIENT RESTING IN BED. PATIENT DENIES ANY PAIN. PATIENT IS UP MAX ASSIST OF
2. PATIENT SEEN BY VASCULAR THIS EVENING AND DRESSINGS CHANGED. PATIENT HAS
LLE WOUND THAT VASCULAR NURSE WAS MADE AWARE OF. PATIENT HAS POOR APPETITE,
SUPPLEMENTS GIVEN. PATIENT DENIES ANY NEEDS AT THIS TIME. CALL LIGHT WITHIN
REACH. WILL CONTINUE TO MONITOR.

## 2018-08-24 NOTE — NUR
PATIENT REMAINS ALERT AND ORIENTED WITH PERIODS OF FORGETFULNESS. VITAL SIGNS
STABLE ON ROOM AIR. IV PATENT IN THE RIGHT FOREARM SALINE LOCKED. PATIENT
CONTINUES TO DECLINE ASSISTANCE TO REPOSITION AND TO OFFLOAD HEALS. DENIES
PAIN OR NAUSEA. DRESSINGS CLEAN, DRY AND INTACT. RESTING COMFORABLY THROUGHOUT
THE NIGHT. HOURLY ROUNDING COMPLETE. FALL PRECAUTIONS IN PLACE. CALL LIGHT
WITHIN REACH. NURSING WILL CONTINUE TO MONITOR.

## 2018-08-24 NOTE — CON
[facility] 
[address] [place]  [address]                    CONSULTATION                  
_______________________________________________________________________________
 
Name:       YAKOV RODRIGUEZ             Room:           [address] [place]  
[facility]#:  [id number]      Account #:      [id number]  
Admission:  08/22/18     Attend Phys:    Fernando Abel MD 
Discharge:               Date of Birth:  [birth year]  
         Report #: [id number]
                                                                     [id number]  
_______________________________________________________________________________
THIS REPORT FOR:  //name//                      
 
CC: Fernando Abel
    [facility]
    Sergio Landin
 
DATE OF SERVICE:  08/23/2018
 
 
REQUESTING PHYSICIAN:  Fernando Abel M.D.
 
REASON FOR CONSULTATION:  Assist in providing dialysis.
 
HISTORY OF PRESENT ILLNESS:  The patient is an 85-year-old man with medical
history significant for end-stage renal disease, severe peripheral artery
disease, admitted for the treatment of his right ischemic foot.  He is on
chronic dialysis on Tuesday, Thursday, and Saturday schedule.  He is on dialysis
now during my examination.
 
PAST MEDICAL HISTORY:
1.  Diabetes mellitus type 2.
2.  End-stage renal disease due to diabetic nephropathy.
3.  Coronary artery disease.
4.  Status post aortic valve replacement.
5.  Chronic atrial fibrillation.
 
SOCIAL HISTORY:  He used to smoke.
 
FAMILY HISTORY:  Noncontributory.
 
PHYSICAL EXAMINATION:
GENERAL:  He is examined on dialysis.
VITAL SIGNS:  Reviewed.
NECK:  Supple.
LUNGS:  Clear.
CARDIOVASCULAR:  Irregular rate.
ABDOMEN:  Soft.
EXTREMITIES:  His right foot is dressed.
 
LABORATORY DATA:  Significant for potassium of 5.2 and creatinine of 4.1.  His
hemoglobin is 9.3 and white count is 8.2 thousand.
 
ASSESSMENT AND PLAN:  An 85-year-old gentleman admitted for chronic right foot
 
 
 
[facility] 
[address] [place]  [address]                    CONSULTATION                  
_______________________________________________________________________________
 
Name:       YAKOV RODRIGUEZ             Room:           [address] [place]  
[facility]#:  [id number]      Account #:      [id number]  
Admission:  08/22/18     Attend Phys:    Fernando Abel MD 
Discharge:               Date of Birth:  [birth year]  
         Report #: [id number]
                                                                     [id number]  
_______________________________________________________________________________
infection.  Infectious Disease doctor is on the case.  Antibiotics ordered. 
From my standpoint, I will provide chronic dialysis while he is in the hospital.
 
 
 
 
 
 
 
 
 
 
 
 
 
 
 
 
 
 
 
 
 
 
 
 
 
 
 
 
 
 
 
 
 
 
 
 
 
 
 
 
 
 
<ELECTRONICALLY SIGNED>
                                        By:  Alexy Wood MD      
08/24/18     1047
D: 08/23/18 1157_______________________________________
T: 08/23/18 2321Akristin Wood MD         /[facility]

## 2018-08-25 VITALS — DIASTOLIC BLOOD PRESSURE: 66 MMHG | SYSTOLIC BLOOD PRESSURE: 153 MMHG

## 2018-08-25 VITALS — SYSTOLIC BLOOD PRESSURE: 148 MMHG | DIASTOLIC BLOOD PRESSURE: 64 MMHG

## 2018-08-25 VITALS — DIASTOLIC BLOOD PRESSURE: 59 MMHG | SYSTOLIC BLOOD PRESSURE: 136 MMHG

## 2018-08-25 LAB
ALBUMIN SERPL-MCNC: 2.4 G/DL (ref 3.4–5)
ALP SERPL-CCNC: 70 U/L (ref 46–116)
ALT SERPL-CCNC: 17 U/L (ref 30–65)
ANION GAP SERPL CALC-SCNC: 6 MMOL/L (ref 7–16)
AST SERPL-CCNC: 43 U/L (ref 15–37)
BILIRUB SERPL-MCNC: 0.9 MG/DL
BUN SERPL-MCNC: 35 MG/DL (ref 7–18)
CALCIUM SERPL-MCNC: 8.2 MG/DL (ref 8.5–10.1)
CHLORIDE SERPL-SCNC: 101 MMOL/L (ref 98–107)
CO2 SERPL-SCNC: 30 MMOL/L (ref 21–32)
CREAT SERPL-MCNC: 4.1 MG/DL (ref 0.6–1.3)
GLUCOSE SERPL-MCNC: 80 MG/DL (ref 70–99)
HCT VFR BLD CALC: 30.7 % (ref 42–52)
HGB BLD-MCNC: 10.2 GM/DL (ref 14–18)
INR PPP: 3.4
MAGNESIUM SERPL-MCNC: 2 MG/DL (ref 1.8–2.4)
MCH RBC QN AUTO: 29.4 PG (ref 26–34)
MCHC RBC AUTO-ENTMCNC: 33.2 G/DL (ref 28–37)
MCV RBC: 88.4 FL (ref 80–100)
MPV: 7.7 FL. (ref 7.2–11.1)
PLATELET COUNT*: 187 THOU/UL (ref 150–400)
POTASSIUM SERPL-SCNC: 4.4 MMOL/L (ref 3.5–5.1)
PROT SERPL-MCNC: 5.9 G/DL (ref 6.4–8.2)
PROTHROMBIN TIME: 32.2 SECONDS (ref 9.2–11.5)
RBC # BLD AUTO: 3.47 MIL/UL (ref 4.5–6)
RDW-CV: 18.1 % (ref 10.5–14.5)
SODIUM SERPL-SCNC: 137 MMOL/L (ref 136–145)
WBC # BLD AUTO: 7.5 THOU/UL (ref 4–11)

## 2018-08-25 PROCEDURE — 5A1D70Z PERFORMANCE OF URINARY FILTRATION, INTERMITTENT, LESS THAN 6 HOURS PER DAY: ICD-10-PCS | Performed by: INTERNAL MEDICINE

## 2018-08-25 NOTE — NUR
ASSUMED PATIENT CARE AT 1900. PATIENT ALERT AND ORIENTED TIMES FOUR.  VERY
SLOW TO RESPOND TO QUESTIONS.  IV PATENT TO FLUSHES.  DRESSING TO BILATERAL
LOWER EXT C/D/I, PROTECTIVE BOOTS IN PLACE.  PATIENT RECEIVED PARTIAL BED BATH
THIS SHIFT.  UNSURE IF PATIENT STILL PRODUCES URINE.  PATIENT IS TO HAVE
DIALYSIS AT 0900 THIS AM.  PATIENT AWARE.  NO COMPLAINTS OF PAIN OR DISCOMFORT
NOTED THROUGH THE NIGHT.  HOURLY ROUNDING AND RN ASSESSMENT COMPLETED AS
CHARTED

## 2018-08-25 NOTE — NUR
PATIENT HAD DIALYSIS THIS AFTERNOON, 2.3L REMOVED PER DIALYSIS NURSE REPORT.
DRESSINGS TO BLE'S CHANGED PER PROTOCOL, LODUS BOOTS REMAIN IN PLACE. TURN Q2.
PRN TYLENOL GIVEN THIS EVENING FOR FOOT PAIN. WIFE AT BEDSIDE.

## 2018-08-26 VITALS — SYSTOLIC BLOOD PRESSURE: 92 MMHG | DIASTOLIC BLOOD PRESSURE: 34 MMHG

## 2018-08-26 VITALS — DIASTOLIC BLOOD PRESSURE: 65 MMHG | SYSTOLIC BLOOD PRESSURE: 144 MMHG

## 2018-08-26 VITALS — SYSTOLIC BLOOD PRESSURE: 130 MMHG | DIASTOLIC BLOOD PRESSURE: 64 MMHG

## 2018-08-26 LAB
INR PPP: 4
PROTHROMBIN TIME: 38.3 SECONDS (ref 9.2–11.5)

## 2018-08-26 NOTE — NUR
PATIENT UP TO BSC THIS SHIFT X 2, BM'S NOTED. UP WITH GAIT BELT AND WALKER,
PATIENT HEAVY ASSIST. REFUSING DINNER THIS EVENING, PATIENT STATED HE HAD
CHICKEN LATE THIS AFTERNOON FROM FAMILY. NO INSULIN REQUIRED THIS SHIFT WITH
MEALS. PHOTOS TAKEN OF WOUNDS PER PROTOCOL, DRESSINGS CHANGED PER ORDERS. TCOM
TESTING TO BE DONE TOMORROW. TURNED Q2. PODUS BOOTS REMAIN IN PLACE TO DWIGHT
LE'S.

## 2018-08-26 NOTE — NUR
PATIENT HAS BEEN RESTLESS DURING THE NIGHT AND HAS BEEN CONFUSED. PATIENT
ANXIOUS AND IMPULSIVE AT TIMES. PATIENT TRYING TO GET UP OUT OF BED AND GETS
VERY IRRITATED WITH NURSING STAFF AND THREATENED TO HIT NURSE AND TECH. VSS
ON RA. PATIENT TURNED EVERY 2HRS AND PRN. MEDICATIONS GIVEN AS ORDERED AND
CHARTED. DRESSING'S TO BILATERAL LOWER EXTREMITIES ARE C/D/I AND PROCARE BOOTS
IN PLACE. DRESSING TO RIGHT ARM C/D/I.  DIALYSIS CATHETER TO RIGHT CHEST AND
IV TO RIGHT FOREARM-SL. IV ABT GIVEN WITHOUT ANY ADVERSE SIDE EFFECTS NOTED.
FALL PRECAUTIONS IN PLACE AND HOURLY ROUNDS MADE. WILL CONTINUE WITH PLAN OF
CARE AND NURSING TO MONITOR.

## 2018-08-27 VITALS — DIASTOLIC BLOOD PRESSURE: 68 MMHG | SYSTOLIC BLOOD PRESSURE: 135 MMHG

## 2018-08-27 VITALS — SYSTOLIC BLOOD PRESSURE: 139 MMHG | DIASTOLIC BLOOD PRESSURE: 67 MMHG

## 2018-08-27 VITALS — DIASTOLIC BLOOD PRESSURE: 68 MMHG | SYSTOLIC BLOOD PRESSURE: 139 MMHG

## 2018-08-27 LAB
ALBUMIN SERPL-MCNC: 2.3 G/DL (ref 3.4–5)
ALP SERPL-CCNC: 72 U/L (ref 46–116)
ALT SERPL-CCNC: 16 U/L (ref 30–65)
ANION GAP SERPL CALC-SCNC: 3 MMOL/L (ref 7–16)
AST SERPL-CCNC: 39 U/L (ref 15–37)
BILIRUB SERPL-MCNC: 0.6 MG/DL
BUN SERPL-MCNC: 30 MG/DL (ref 7–18)
CALCIUM SERPL-MCNC: 8.2 MG/DL (ref 8.5–10.1)
CHLORIDE SERPL-SCNC: 99 MMOL/L (ref 98–107)
CO2 SERPL-SCNC: 33 MMOL/L (ref 21–32)
CREAT SERPL-MCNC: 4.3 MG/DL (ref 0.6–1.3)
GLUCOSE SERPL-MCNC: 97 MG/DL (ref 70–99)
HCT VFR BLD CALC: 30.9 % (ref 42–52)
HGB BLD-MCNC: 10.2 GM/DL (ref 14–18)
INR PPP: 4.8
MAGNESIUM SERPL-MCNC: 2 MG/DL (ref 1.8–2.4)
MCH RBC QN AUTO: 28.9 PG (ref 26–34)
MCHC RBC AUTO-ENTMCNC: 32.9 G/DL (ref 28–37)
MCV RBC: 87.9 FL (ref 80–100)
MPV: 7.3 FL. (ref 7.2–11.1)
PLATELET COUNT*: 194 THOU/UL (ref 150–400)
POTASSIUM SERPL-SCNC: 4.9 MMOL/L (ref 3.5–5.1)
PROT SERPL-MCNC: 5.4 G/DL (ref 6.4–8.2)
PROTHROMBIN TIME: 45.3 SECONDS (ref 9.2–11.5)
RBC # BLD AUTO: 3.51 MIL/UL (ref 4.5–6)
RDW-CV: 18.5 % (ref 10.5–14.5)
SODIUM SERPL-SCNC: 135 MMOL/L (ref 136–145)
WBC # BLD AUTO: 7.4 THOU/UL (ref 4–11)

## 2018-08-27 NOTE — NUR
PATIENT HAS SLEPT WELL THROUGHOUT THE NIGHT WITHOUT ANY ISSUES. NO C/O PAIN.
VSS ON RA. DRESSINGS TO LEG WOUNDS ARE C/D/I AND DRESSING TO RIGHT ARM IS
C/D/I. PRAFO BOOTS ON BILATERALLY. IV IN RIGHT FOREARM-SL. DIALYSIS CATHETER
TO RIGHT CHEST IN PLACE. PATIENT HAS BEEN TURNED EVERY 2 HRS. PATIENT IS
INCONTINENT AT TIMES AND ANNETTE CARE PERFORMED. FALL PRECAUTIONS IN PLACE AND
HOURLY ROUNDS MADE. WILL CONTINUE WITH PLAN OF CARE AND NURSING TO MONITOR.

## 2018-08-27 NOTE — NUR
WOUND CARE NOTE: CONSULT RECEIVED FOR RLE ULCER.
 
PATIENT KNOWN TO ME FROM PREVIOUS HOSPITAL STAY.
 
RIGHT HEEL:  INTACT SEROSANGUINEOUS FILLED BLISTER.  DEEP TISSUE INJURY
MEASURING 3.5X4.5.  NO OPENING, NO DRAINAGE.  ANNETTE-WOUND INTACT.  APPLIED
BETADINE.  COVERED WITH 4X4.  SECURED WITH KERLIX.
 
RIGHT FOOT, PLANTAR SURFACE:  FULL THICKNESS DIABETIC FOOT ULCER MEASURING
2X4X2.5 PROBES TO BONE.  WOUND BED IS MOIST, BLACK/YELLOW.  WOUND EDGES ARE
DRY, BLACK.  CLEANSED WITH WOUND CLEANSER.  PAINTED WITH BETADINE AND PACKED
WITH AQUACEL AG.  COVERED WITH ABD.  SECURED WITH KERLIX.
 
RIGHT 4TH TOE:  BLACK, DRY ESCHAR.  EXTENDS FROM TIP OF TOE ALL THE WAY TO
METATARSAL HEAD.  APPLIED BETADINE AND ALLOWED TO DRY.
 
BILATERAL ANTERIOR LEGS WITH MULTIPLE AREAS OF ABRASION/PARTIAL THICKNESS
WOUNDS.  APPLIED OIL EMULSION GAUZE AND SECURED WITH KERLIX.
 
PATIENT TOLERATED DRESSING CHANGES WELL.  REPLACED PODUS BOOTS.  VASCULAR NP
WAS IN TO ASSESS AREAS AS WELL.  AWAITING TO HAVE TCOM PERFORMED.
 
RECOMMEND
PODUS BOOTS TO BILATERAL FEET
ENCOURAGE GOOD NUTRITION/HYDRATION
TIGHT BLOOD GLUCOSE CONTROL

## 2018-08-27 NOTE — NUR
PATIENT HAS BEEN ALERT AND ORIENTED TODAY, FORGETFUL AT TIMES AND CONFUSED
ABOUT PROCEDURES AT TIMES. VITAL SIGNS HAVE BEEN STABLE ON ROOM AIR. NO
COMPLAINTS OF ANY PAIN TODAY. CALL LIGHT IS IN REACH, BED ALARM IS ON, WILL
CONTINUE TO MONITOR.

## 2018-08-27 NOTE — NUR
PATIENT IS ALERT AND ORIENTED TODAY. PAIN THAT IS SOMEWHAT CONTROLLED WITH
ORAL PAIN MEDICATIONS. TOLERATED THERAPY OKAY TODAY, NEEDS TWO PEOPLE TO GET
UP AND AMBULATE ANYWHERE. VITAL SIGNS HAVE BEEN STABLE ON 2 LITERS OF OXYGEN.
CALL LIGHT HAS BEEN IN REACH TODAY. PATIENT IS BEING DISCHARGED TO THE [place]
TODAY, REPORT GIVEN TO OCTAVIO. PATIENT LEFT VIA WHEEL CHAIR WITH DAUGHTER AND
WHEEL CHAIR .

## 2018-08-28 VITALS — DIASTOLIC BLOOD PRESSURE: 78 MMHG | SYSTOLIC BLOOD PRESSURE: 145 MMHG

## 2018-08-28 LAB
INR PPP: 4.5
PROTHROMBIN TIME: 42.7 SECONDS (ref 9.2–11.5)

## 2018-08-28 NOTE — NUR
PATIENT ORIENTED X4 ON HOURLY ROUNDS.  VITALS STABLE ON ROOM AIR.  PATIENT
REFUSED REPOSITIONING. PRESSURE BOOTS IN PLACE. NO CONCERNS VOICED AT THIS
TIME. BED ALARM ON. CALL LIGHT WITHIN REACH. WILL CONTINUE TO MONITOR.

## 2018-08-28 NOTE — NUR
PATIENT ARRIVED BACK TO THE UNIT AT 1820 FROM DIALYSIS. REMAISN ALERT AND
ORIENTED X4. VS REMAIN STABLE ON ROOM AIR. BLOOD SUGAR 77, HEATED UP PATIENTS
DINNER AND HE IS EATING NOW. FAMILY IS PRESENT IN THE ROOM. CALL LIGHT WITHIN
REACH, NURSING WILL CONTINUE TO MONITOR.

## 2018-08-29 VITALS — DIASTOLIC BLOOD PRESSURE: 54 MMHG | SYSTOLIC BLOOD PRESSURE: 139 MMHG

## 2018-08-29 VITALS — DIASTOLIC BLOOD PRESSURE: 60 MMHG | SYSTOLIC BLOOD PRESSURE: 145 MMHG

## 2018-08-29 VITALS — DIASTOLIC BLOOD PRESSURE: 43 MMHG | SYSTOLIC BLOOD PRESSURE: 112 MMHG

## 2018-08-29 VITALS — SYSTOLIC BLOOD PRESSURE: 139 MMHG | DIASTOLIC BLOOD PRESSURE: 65 MMHG

## 2018-08-29 VITALS — DIASTOLIC BLOOD PRESSURE: 62 MMHG | SYSTOLIC BLOOD PRESSURE: 134 MMHG

## 2018-08-29 LAB
ANION GAP SERPL CALC-SCNC: 6 MMOL/L (ref 7–16)
CHLORIDE SERPL-SCNC: 99 MMOL/L (ref 98–107)
CO2 SERPL-SCNC: 31 MMOL/L (ref 21–32)
HCT VFR BLD CALC: 26.9 % (ref 42–52)
HGB BLD-MCNC: 9 GM/DL (ref 14–18)
INR PPP: 1.8
INR PPP: 2.8
POTASSIUM SERPL-SCNC: 4.3 MMOL/L (ref 3.5–5.1)
PROTHROMBIN TIME: 17.6 SECONDS (ref 9.2–11.5)
PROTHROMBIN TIME: 26.8 SECONDS (ref 9.2–11.5)
SODIUM SERPL-SCNC: 136 MMOL/L (ref 136–145)

## 2018-08-29 PROCEDURE — 30233L1 TRANSFUSION OF NONAUTOLOGOUS FRESH PLASMA INTO PERIPHERAL VEIN, PERCUTANEOUS APPROACH: ICD-10-PCS | Performed by: INTERNAL MEDICINE

## 2018-08-29 PROCEDURE — 0Y6C0Z1 DETACHMENT AT RIGHT UPPER LEG, HIGH, OPEN APPROACH: ICD-10-PCS | Performed by: SURGERY

## 2018-08-29 PROCEDURE — 30233K1 TRANSFUSION OF NONAUTOLOGOUS FROZEN PLASMA INTO PERIPHERAL VEIN, PERCUTANEOUS APPROACH: ICD-10-PCS

## 2018-08-29 NOTE — NUR
PATIENT A&OX4, FORGETFUL. ROOM AIR, IV RIGHT FOREARM SALINE LOCK. HAD SURGERY
FOR RT AKA TODAY. PRIOR TO 1 BAG OF FFP GIVEN TO BRING DOWN INR. INR DOWN TO
1.8 AFTER TRANSFUSION. TAKEN TO PACU AT 1225. ARRIVED BACK AT ROOM 1500.
SLEEPING, RESTING QUIETLY. DENIES ANY PAIN AT THIS TIME. TOLLERATING LIQUIDS
FINE. CURRENTLY BEDREST, PT AND OT ORDERED. NO OTHER CONCERNS AT THIS TIME.
APPROPRAITE AND COOPORATIVE WITH CARE.

## 2018-08-29 NOTE — NUR
PATIENT ALERT AND ORIENTED BUT IRRITABLE AT TIMES. UP WITH MAX ASSIST X2 TO
BSC. DENIES PAIN. TOLERATING DIET. NPO SINCE MIDNIGHT. PRAFO BOOTS IN PLACE TO
BILAT FEET. VITALS STABLE ON ROOM AIR. WILL CONTINUE TO MONITOR.

## 2018-08-30 VITALS — SYSTOLIC BLOOD PRESSURE: 130 MMHG | DIASTOLIC BLOOD PRESSURE: 64 MMHG

## 2018-08-30 VITALS — SYSTOLIC BLOOD PRESSURE: 133 MMHG | DIASTOLIC BLOOD PRESSURE: 57 MMHG

## 2018-08-30 VITALS — DIASTOLIC BLOOD PRESSURE: 43 MMHG | SYSTOLIC BLOOD PRESSURE: 110 MMHG

## 2018-08-30 VITALS — DIASTOLIC BLOOD PRESSURE: 50 MMHG | SYSTOLIC BLOOD PRESSURE: 130 MMHG

## 2018-08-30 LAB
ALBUMIN SERPL-MCNC: 2.5 G/DL (ref 3.4–5)
ANION GAP SERPL CALC-SCNC: 7 MMOL/L (ref 7–16)
BUN SERPL-MCNC: 40 MG/DL (ref 7–18)
CALCIUM SERPL-MCNC: 8.5 MG/DL (ref 8.5–10.1)
CHLORIDE SERPL-SCNC: 97 MMOL/L (ref 98–107)
CO2 SERPL-SCNC: 30 MMOL/L (ref 21–32)
CREAT SERPL-MCNC: 4.6 MG/DL (ref 0.6–1.3)
GLUCOSE SERPL-MCNC: 110 MG/DL (ref 70–99)
HCT VFR BLD CALC: 26.2 % (ref 42–52)
HGB BLD-MCNC: 8.7 GM/DL (ref 14–18)
INR PPP: 1.9
MCH RBC QN AUTO: 29.2 PG (ref 26–34)
MCHC RBC AUTO-ENTMCNC: 33.4 G/DL (ref 28–37)
MCV RBC: 87.3 FL (ref 80–100)
MPV: 7 FL. (ref 7.2–11.1)
PHOSPHATE SERPL-MCNC: 6.2 MG/DL (ref 2.5–4.9)
PLATELET COUNT*: 187 THOU/UL (ref 150–400)
POTASSIUM SERPL-SCNC: 4.8 MMOL/L (ref 3.5–5.1)
PROTHROMBIN TIME: 18.1 SECONDS (ref 9.2–11.5)
RBC # BLD AUTO: 3 MIL/UL (ref 4.5–6)
RDW-CV: 18.9 % (ref 10.5–14.5)
SODIUM SERPL-SCNC: 134 MMOL/L (ref 136–145)
WBC # BLD AUTO: 9.8 THOU/UL (ref 4–11)

## 2018-08-30 NOTE — NUR
ASSUMED CARE OF PT THIS AM AROUND 0715- MED SURG STATUS IN PLACE AND
MAINTAINED- UPON ASSESSMENT PT NOTED TO BE RESTING IN BED, EYES OPEN- PT A&O
X4, OCCASSIONAL FORGETFULLNESS NOTED- CONTINENT OF BOWEL AND BLADDER- BED
REST IN PLACE WITH Q2 HOUR TURNS IN PLACE AS INDICATED- LCTA, RESP EVEN AND
UN-LABORED- VSS, O2 SAT 97% ON RA- ABDOMEN SOFT/ROUND/NON-TENDER, BS X4 QUADS-
PT REPORTS LAST BM 8/29/18- LEFT CHIN ABRASSION NOTED, JOSHUA, BOOT TO LLE IN
PLACE AS INDICATED- RIGHT ABOVE KNEE AMP NOTED, DRESSING IN PLACE AS
INDICATED, WITH NO VISIBLE DRAINAGE NOTED; EXTREMITY ELEVATED ON PILLOW- SET
UP MEALS INDICATED, POOR PO INTAKE NOTED THIS AM WITH BREAKFAST BOOST GIVEN,
BS MONITORED AS ORDERED- IV NOTED TO RIGHT FA INTACT AND SL- RIGHT CHEST
DIALYSIS PORT NOTED INTACT WITH DRESSING- AWAITTING DIALYSIS PLANS THIS SHIFT-
PT DENIES ANY C/O PAIN/DISCOMFORT AT THIS TIME- CALL LIGHT AND PERSONAL
BELONGINGS WITH IN REACH- HOURLY ROUNDS IN PLACE R/T SAFETY/NEEDS- ALL NEEDS
MET AT THIS TIME-WCTM

## 2018-08-30 NOTE — NUR
ASSUMED PT CARE. REPORT RECEIVED FROM NURSE. PT IS ALERT AWAKE ORIENTED X4 .
SITTING IN BED . VITAL SIGNS ARE WITHIN NORMAL LIMIT. HE IS ON ROOM AIR AND O2
SATURATION IS 97%. MED GIVEN. ASSESSMENT PERFORMED . REFER TO CHARTING. PT
HAS A RIGHT ABOVE KNEE AMPUTATION. THE DRESSING IS DRY AND INTACT. THE
SURGICAL KNEE IS ELEVATED ON A PILLOW. TPT IS TURNED Q2 HOURS. PT COMPLAINS OF
PAIN AT AROUND MIDNIGHT IN HIS RIGHT AKA.  TYLENOL GIVEN . PT IS NOW SLEEPING.
NO COMPLAINT OF PAIN. CALL LIGHT AT REACH. WILL CONTINUE TO MONITOR.

## 2018-08-30 NOTE — NUR
PT SLY RESTING IN BED, FAMILY AT SIDE VISITING- M/S STATUS IN PLACE AND
MAINTAINED AS INDICATED- SET UP WITH MEALS REQUIRED, FAIR PO INTAKE NOTED WITH
DINNER THIS SHIFT-BS MONITORED AS ORDERED, NO INSULIN NEEDED THIS SHIFT
PT OFF UNIT FROM AROUND 1300 TO 1630 FOR DIALYSIS, REPORTED TO HAVE REMOVED
0.5 L- PT C/O NAUSEA X1 DURRING DIALYIS WITH ZOFRAN GIVEN AT 1443- UPON
RETURNING FROM DIALYIS PT REPORTS PAIN 5/10 TO RLE, PRN TYLENOL GIVEN AT 1705-
PT REPORTS MEDICATION TO CANDY EFFECTIVE- DRESSING TO RIGHT AMP IN PLACE AS
INDICATED, LEG ELEVATION IN PLACE- CARDIOLOGY NOTED TO SIGN OFF THIS SHIFT-
CALL LIGHT AND PERSONAL BELONGINGS WITH IN REACH- Q2 HOUR TURNS IN PLACE AS
INDICATED- HOURLY ROUNDS IN PLACE R/T SAFETY/NEEDS- ALL NEEDS MET AT THIS
TIME-WCTM

## 2018-08-30 NOTE — NUR
DISCHARGE PLANNER SPOKE TO THE PATIENT AND SPOUSE TO DISCUSS DISCHARGE
PLANNING NEEDS, AND SKILLED AT [facility] AT D/C. PATIENT'S SPOUSE
INFORMS THAT 'I DO NOT WANT HIM TO GO BACK TO [facility], BUT I WOULD LIKE TO HAVE
RE-ASSESSED FOR INPATIENT REHAB HERE IN THE HOSPITAL'. PATIENT THEN SATES 'I'M
NOT GOING TO NO REHAB.' DISCHARGE PLANNER EXPLAINED WHAT THE REHAB WOULD BE
FOR. PATIENT CONTINUES TO STATES 'I DON'T CARE. I'M NOT GOING TO REHAB! I
DON'T EVEN HAVE A LEG ANYMORE, AND I'M NOT GOING TO REHAB!' DISCHARGE PLANNER
INFORMED THE PATIENT'S SPOUSE THAT THE PATIENT MUST BE IN AGREEMENT WITH THE
PLAN TO GO TO REHAB, AND THAT WE CAN NOT FORCE HIM TO DO ANYTHING THAT HE DOES
NOT WANT TO DO. DISCHARGE PLANNER INFORMED CM OF ALL INFO. CM WILL REMAIN
AVAILABLE TO ASSIST AND FOLLOW AS NEEDED.

## 2018-08-31 VITALS — DIASTOLIC BLOOD PRESSURE: 52 MMHG | SYSTOLIC BLOOD PRESSURE: 103 MMHG

## 2018-08-31 VITALS — DIASTOLIC BLOOD PRESSURE: 50 MMHG | SYSTOLIC BLOOD PRESSURE: 125 MMHG

## 2018-08-31 VITALS — SYSTOLIC BLOOD PRESSURE: 114 MMHG | DIASTOLIC BLOOD PRESSURE: 54 MMHG

## 2018-08-31 VITALS — DIASTOLIC BLOOD PRESSURE: 44 MMHG | SYSTOLIC BLOOD PRESSURE: 108 MMHG

## 2018-08-31 VITALS — DIASTOLIC BLOOD PRESSURE: 43 MMHG | SYSTOLIC BLOOD PRESSURE: 110 MMHG

## 2018-08-31 LAB
ABSOLUTE BASOPHILS: 0.1 THOU/UL (ref 0–0.2)
ABSOLUTE EOSINOPHILS: 0.3 THOU/UL (ref 0–0.7)
ABSOLUTE MONOCYTES: 1.3 THOU/UL (ref 0–1.2)
ALBUMIN SERPL-MCNC: 2 G/DL (ref 3.4–5)
ALP SERPL-CCNC: 64 U/L (ref 46–116)
ALT SERPL-CCNC: 14 U/L (ref 30–65)
ANION GAP SERPL CALC-SCNC: 1 MMOL/L (ref 7–16)
AST SERPL-CCNC: 35 U/L (ref 15–37)
BASOPHILS NFR BLD AUTO: 0.6 %
BILIRUB SERPL-MCNC: 0.6 MG/DL
BUN SERPL-MCNC: 20 MG/DL (ref 7–18)
CALCIUM SERPL-MCNC: 8 MG/DL (ref 8.5–10.1)
CHLORIDE SERPL-SCNC: 100 MMOL/L (ref 98–107)
CO2 SERPL-SCNC: 35 MMOL/L (ref 21–32)
CREAT SERPL-MCNC: 2.8 MG/DL (ref 0.6–1.3)
EOSINOPHIL NFR BLD: 3.2 %
GLUCOSE SERPL-MCNC: 110 MG/DL (ref 70–99)
GRANULOCYTES NFR BLD MANUAL: 70.6 %
HCT VFR BLD CALC: 22.2 % (ref 42–52)
HGB BLD-MCNC: 7.5 GM/DL (ref 14–18)
LYMPHOCYTES # BLD: 1.1 THOU/UL (ref 0.8–5.3)
LYMPHOCYTES NFR BLD AUTO: 11.8 %
MCH RBC QN AUTO: 29.2 PG (ref 26–34)
MCHC RBC AUTO-ENTMCNC: 33.6 G/DL (ref 28–37)
MCV RBC: 86.9 FL (ref 80–100)
MONOCYTES NFR BLD: 13.8 %
MPV: 7.1 FL. (ref 7.2–11.1)
NEUTROPHILS # BLD: 6.6 THOU/UL (ref 1.6–8.1)
NUCLEATED RBCS: 0 /100WBC
PLATELET COUNT*: 153 THOU/UL (ref 150–400)
POTASSIUM SERPL-SCNC: 4 MMOL/L (ref 3.5–5.1)
PROT SERPL-MCNC: 5.2 G/DL (ref 6.4–8.2)
RBC # BLD AUTO: 2.56 MIL/UL (ref 4.5–6)
RDW-CV: 18.5 % (ref 10.5–14.5)
SODIUM SERPL-SCNC: 136 MMOL/L (ref 136–145)
WBC # BLD AUTO: 9.3 THOU/UL (ref 4–11)

## 2018-08-31 NOTE — NUR
Oriented x 3, hard of hearing, possibly forgetful. Patient has been hostile to
this nurse since I first went in his room. He called me an idiot for no
apparent reason. His dressing to his rt leg is dry and intact and has a type
of plastic over the acewrap dressing from post op. His lungs are clear and
slightly diminished. Vitals have been stable. He has been turned every 2
hours. He had dialysis yesterday and hasn't voided this shift. He is wearing
his prafo boot LLE. He has a dialysis cath to his rt chest which has 2 ports.
He has been very uncooperative with this nurse so i was unable to investigate
it further. He was cooperative enough to take his meds. He has slept.

## 2018-08-31 NOTE — NUR
PT. STABLE THROUGH OUT THE DAY.  REFUSED TO WORK WITH THERAPY OR TALK TO
THERAPIST.  MANY FAMILY MEMEBERS HERE MOST OF THE DAY.  PT. VERY IRRITABLE
WITH STAFF DURING CARE.   APPETITE REMAINED BETTER TODAY, PT. ATE [employer] FOR
DINNER THAT FAMILY BROUGHT IN AND SOME BISCUITS AND GRAVEY AT LUNCH.  PT.
BECOMING CONFUSED AT SHIFT CHANGE, ASKED TO GO DOWNSTAIRS TO MEET HIS WIFE FOR
A .  ATTEMPTED TO REORIENTATE AND PT. BECOMING MAD.  CALLED PT. WIFE
FOR HIM TO TALK WITH AND THIS SEEMED TO CALM HIM DOWN.  HOURLY ROUNDING
COMPLETED THROUGH OUT THE DAY FOR PT. SAFETY.  PT. REPOSITIONED Q2 FOR
PRESSURE RELIEF.  WOUND CARE COMPLETED TO LEFT LE.  RIGHT LE DRESSING REMAINS
C/D/I.  PT. PAIN WELL TOLERATED TODAY WITH TREATEMENT OF TYLENOL.

## 2018-08-31 NOTE — NUR
DISCUSSED PT.WITH . REHAB CONSULT PUT IN. WOULD MOST LIKELY NEED
LOW ENDURANCE PROGRAM.  PT.WOULDN'T TOTALLY AGREE TO REHAB BUT WAS NOT AS
NEGATIVE AS ON 8/30. CM WILL FOLLOW.

## 2018-08-31 NOTE — NUR
ASSUMED PT. CARE AND RECEIVED REPORT AT 0730.  PT A/OX4 WITH SOME NOTED
CONFUSION, VSS.  PT. DENIES CURRENT PAIN/SOB.  ON RA @ 97%.  FULL ASSESSMENT
COMPLETED, REFER TO CHARTING.  PT. APPETITE IMPROVED THIS MORNING, ATE APPROX.
50% OF BREAKFAST, TOLERATED WELL WITH NO NAUSEA NOTED.  RIGHT AMPUTATION
SURGICAL DRESSING REMAINS IN PLACE C//D/I.  LEFT FOOT BOOT IN PLACE WITH
BANDAGE TO LEFT SHIN C/D/I.  PT.  WIFE AND SON AT BEDSIDE.  CALL LIGHT IN
REACH, WILL CONTINUE WITH PLAN OF CARE.

## 2018-08-31 NOTE — OP
[address]  [address]                    OPERATIVE REPORT              
_______________________________________________________________________________
 
Name:       YAKOV RODRIGUEZ             Room:           [address] [place]  
..#:  [id number]      Account #:      [id number]  
Admission:  08/22/18     Attend Phys:    Fernando Abel MD 
Discharge:               Date of Birth:  [birth year]  
         Report #: [id number]
                                                                     [id number]  
_______________________________________________________________________________
THIS REPORT FOR:  //name//                      
 
CC: Fernando Landin
 
DATE OF SERVICE:  08/29/2018
 
 
PREOPERATIVE DIAGNOSIS:  Critical right lower extremity ischemia with no further
revascularization options.
 
POSTOPERATIVE DIAGNOSIS:  Critical right lower extremity ischemia with no
further revascularization options.
 
OPERATION:  Right above knee amputation.
 
SURGEON:  Shaggy Sandra DO.
 
ASSISTANT:  Toby Larsen, PGY3.
 
ANESTHESIA:  General.
 
ESTIMATED BLOOD LOSS:  250 mL.
 
FLUIDS:  See anesthesia report.
 
URINE OUTPUT:  None.
 
SPECIMENS:  Right leg.
 
COMPLICATIONS:  None.
 
IMPLANTS:  None.
 
FINDINGS:  The patient had a standard above-knee amputation performed.  He had
good pulsatile bleeding at that level.  This was all controlled with cautery and
suture ligatures.  His skin is reapproximated well without any undue tension.
 
CLINICAL HISTORY:  The patient is an 85-year-old man with a known critical right
lower extremity ischemia.  He has undergone multiple revascularizations
including cryopreserved tibial artery bypass.  His wounds have not healed.  He
has no further revascularization options.  TCOM testing suggested that above
knee amputation was the best option for wound healing potential.
 
DETAILS OF PROCEDURE:  After informed consent was obtained, the patient was
 
 
 
[facility] 
[address] [place]  [address]                    OPERATIVE REPORT              
_______________________________________________________________________________
 
Name:       YAKOV RODRIGUEZ             Room:           [address] [place]  
.R.#:  [id number]      Account #:      [id number]  
Admission:  08/22/18     Attend Phys:    Fernando Abel MD 
Discharge:               Date of Birth:  [birth year]  
         Report #: [id number]
                                                                     [id number]  
_______________________________________________________________________________
taken to the operating room and placed on the OR bed in supine position,
administered general anesthesia by Anesthesia team.  Right lower extremity was
prepped and draped in usual sterile fashion.  Full timeout was performed
identifying correct patient and procedure.  Next, standard fishmouth incision
was made about 4 fingerbreadths above the patella.  Dissection was carried down
through skin and subcutaneous tissue, both sharp and electrocautery.  The femur
was circumferentially mobilized.  Periosteal elevator was used to elevate
periosteal edges circumferentially around the femur.  I then transected the
femur with the oscillating bone saw, used a rongeur to remove any spurs.  Once
the femur was felt to be adequate, the femoral vessels were ligated with 0 silk
suture ligatures.  The nerve was transected far proximally into the wound this
could reach.  Once hemostasis was ensured, the wound was irrigated with some
antibiotic solution.  It was then closed in layers with 0 and 2-0 Vicryl and
staples in the skin.  A sterile dressing was applied.  All sponge, sharp and
instrument counts reported as correct x 2.  He tolerated the procedure well and
was transferred to recovery area in stable condition.
 
 
 
 
 
 
 
 
 
 
 
 
 
 
 
 
 
 
 
 
 
 
 
 
 
 
 
 
<ELECTRONICALLY SIGNED>
                                        By:  Shaggy Sandra DO          
08/31/18     1459
D: 08/29/18 1424_______________________________________
T: 08/29/18 1442Anahum Sandra DO             /nt

## 2018-09-01 VITALS — SYSTOLIC BLOOD PRESSURE: 116 MMHG | DIASTOLIC BLOOD PRESSURE: 47 MMHG

## 2018-09-01 VITALS — DIASTOLIC BLOOD PRESSURE: 60 MMHG | SYSTOLIC BLOOD PRESSURE: 153 MMHG

## 2018-09-01 VITALS — DIASTOLIC BLOOD PRESSURE: 50 MMHG | SYSTOLIC BLOOD PRESSURE: 102 MMHG

## 2018-09-01 VITALS — DIASTOLIC BLOOD PRESSURE: 61 MMHG | SYSTOLIC BLOOD PRESSURE: 131 MMHG

## 2018-09-01 LAB
ALBUMIN SERPL-MCNC: 2.2 G/DL (ref 3.4–5)
ANION GAP SERPL CALC-SCNC: 6 MMOL/L (ref 7–16)
BUN SERPL-MCNC: 31 MG/DL (ref 7–18)
CALCIUM SERPL-MCNC: 8.2 MG/DL (ref 8.5–10.1)
CHLORIDE SERPL-SCNC: 99 MMOL/L (ref 98–107)
CO2 SERPL-SCNC: 31 MMOL/L (ref 21–32)
CREAT SERPL-MCNC: 4.4 MG/DL (ref 0.6–1.3)
GLUCOSE SERPL-MCNC: 91 MG/DL (ref 70–99)
HCT VFR BLD CALC: 23.6 % (ref 42–52)
HGB BLD-MCNC: 8 GM/DL (ref 14–18)
INR PPP: 5.1
INR PPP: 5.4
PHOSPHATE SERPL-MCNC: 4 MG/DL (ref 2.5–4.9)
POTASSIUM SERPL-SCNC: 3.9 MMOL/L (ref 3.5–5.1)
PROTHROMBIN TIME: 51.3 SECONDS (ref 9.2–11.5)
PROTHROMBIN TIME: 54.3 SECONDS (ref 9.2–11.5)
SODIUM SERPL-SCNC: 136 MMOL/L (ref 136–145)

## 2018-09-01 PROCEDURE — 5A1D70Z PERFORMANCE OF URINARY FILTRATION, INTERMITTENT, LESS THAN 6 HOURS PER DAY: ICD-10-PCS | Performed by: INTERNAL MEDICINE

## 2018-09-01 NOTE — NUR
PT. COMPLETED DIALYSIS TODAY, TOLERATED OKAY.  FAIRLY QUIET MOST OF THE SHIFT,
DID NOT ALWAYS ANSWER STAFF'S DIRECT QUESTIONS, BUT WAS COMMUNICATING WITH
FAMILY.  APPETITE VERY POOR TODAY, LITTLE INTAKE EXCEPT [employer] THAT FAMILY
BROUGHT IN.  DISCUSSED NO BM SINCE SURGERY WITH PT/FAMILY.  PT. BECAME VERY
AGITATED, STATES HE HAS HAD 3 BM'S.  BOTH FAMILY AND CHARTING OF PT. DO NOT
SHOW ANY.  ATTEMPTED TO GIVE MIRALAX, PT. RESISTANT TO DRINK.  LEFT WITH WIFE
TO ENCOURAGE INTAKE.  HOURLY ROUNDING COMPLETED THROUGH OUT THE DAY FOR PT.
SAFETY.

## 2018-09-01 NOTE — NUR
RECEIVED REPORT AND ASSUMED CARE AT 1900. VSS. CARDIAC  MONITORING IN PLACE.
PT DENIES ANY COMPLAINTS OF PAIN. ASSESSMENT COMPLETED AS CHARTED. DISCUSSED
PLAN OF CARE WITH PT, VERBALIZED UNDERSTANDING. MEDICATION ADMIN PER ORDERS.
PT ON RA, BEDREST. POSITION CHANGED EVERY TWO HOURS. BED LOCKED IN LOWEST
POSITION, CALL LIGHT WITHIN REACH, HOURLY ROUNDING COMPLETED AND ALL NEEDS
MET. WILL CONTINUE TO MONITOR
LABS = INR 5.1 REDRAW ORDERED BY PHYSICIAN INR 5.4. PHYSICIAN MADE AWARE.

## 2018-09-01 NOTE — NUR
ASSUMED PT. CARE AND RECEIVED REPORT AT 0730.  PT A/OX4, WITH SOME BASELINE
CONFUSION NOTED.  VSS, PT. MED/SURG STATUS.  DENIES CURRENT PAIN/SOB.  ON RA @
99%.  FULL ASSESSMENT COMPLETED, REFER TO CHARTING.  PT. WIFE AT BEDSIDE.
DIALYSIS RN HERE, READY FOR DIALYSIS THIS MORNING.  PT. TRANSFERED UPSTAIRS.
WILL CONTINUE TO MONITOR.

## 2018-09-02 VITALS — SYSTOLIC BLOOD PRESSURE: 126 MMHG | DIASTOLIC BLOOD PRESSURE: 75 MMHG

## 2018-09-02 VITALS — DIASTOLIC BLOOD PRESSURE: 46 MMHG | SYSTOLIC BLOOD PRESSURE: 124 MMHG

## 2018-09-02 VITALS — SYSTOLIC BLOOD PRESSURE: 134 MMHG | DIASTOLIC BLOOD PRESSURE: 54 MMHG

## 2018-09-02 VITALS — SYSTOLIC BLOOD PRESSURE: 125 MMHG | DIASTOLIC BLOOD PRESSURE: 51 MMHG

## 2018-09-02 LAB
ANION GAP SERPL CALC-SCNC: 6 MMOL/L (ref 7–16)
BUN SERPL-MCNC: 24 MG/DL (ref 7–18)
CALCIUM SERPL-MCNC: 8.1 MG/DL (ref 8.5–10.1)
CHLORIDE SERPL-SCNC: 99 MMOL/L (ref 98–107)
CO2 SERPL-SCNC: 31 MMOL/L (ref 21–32)
CREAT SERPL-MCNC: 3 MG/DL (ref 0.6–1.3)
GLUCOSE SERPL-MCNC: 82 MG/DL (ref 70–99)
HCT VFR BLD CALC: 25 % (ref 42–52)
HGB BLD-MCNC: 8.3 GM/DL (ref 14–18)
INR PPP: 4.8
MAGNESIUM SERPL-MCNC: 1.9 MG/DL (ref 1.8–2.4)
MCH RBC QN AUTO: 29.4 PG (ref 26–34)
MCHC RBC AUTO-ENTMCNC: 33.2 G/DL (ref 28–37)
MCV RBC: 88.6 FL (ref 80–100)
MPV: 7.3 FL. (ref 7.2–11.1)
PLATELET COUNT*: 210 THOU/UL (ref 150–400)
POTASSIUM SERPL-SCNC: 4.1 MMOL/L (ref 3.5–5.1)
PROTHROMBIN TIME: 48.8 SECONDS (ref 9.2–11.5)
RBC # BLD AUTO: 2.82 MIL/UL (ref 4.5–6)
RDW-CV: 19.6 % (ref 10.5–14.5)
SODIUM SERPL-SCNC: 136 MMOL/L (ref 136–145)
TROPONIN-I LEVEL: 0.14 NG/ML (ref ?–0.06)
WBC # BLD AUTO: 8.8 THOU/UL (ref 4–11)

## 2018-09-02 NOTE — NUR
ASSUMED PT. CARE AND RECEIVED REPORT AT 0730.  PT A/OX4 WITH SOME BASELINE
CONFUSION AT TIMES, VSS.  FULL ASSESSMENT COMPLETED, REFER TO CHARTING.  PT.
WITH NO REAL COMPLAINTS THIS MORNING.  REQUESTING BISCUITS AND GRAVEY FOR
BREAKFAST, TOLERATED WELL WITH DECENT APPETITE.  PT. GIVEN FULL BED BATH AND
LINEN CHANGED.  DISCUSSED PLAN OF CARE TO SIT ON EDGE OF BED TODAY, PT. AGREED
TO TRY LATER.  PT. WIFE/SON AT BEDSIDE.  WILL CONTINUE WITH PLAN OF CARE.

## 2018-09-02 NOTE — NUR
RECEIVED REPORT AND ASSUMED CARE AT 1900. VSWS. CARDIAC MONITORING IN PLACE.
PT REPORTS PAIN IN R LEG. PRN MEDICATION ADMIN PER ORDERS. ASSESSMENT
COMPELTED AS CHARTED. DISCUSSED PLAN OF CARE WITH PT, VERBALIZED
UNDERSTANDING. MEDICATION ADMIN PER EMAR. HOURLY ROUDNING COMPLETED AND ALL
NEEDS MET. POSITION CHANGE ENCOURAGED EVERY TWO HOURS, PT COMPLIANT WITH SOME
POSITION CHANGES, ON REFUSALS. PT EDUCATED ON RISK OF NOT OFF LOADING. PT
VERBALIZED UNDERSTANDING. BED LOCKED IN LOWEST POSITION CALL LIGHT WITHIN
REACH. WILL CONTINUE TO MONITOR

## 2018-09-03 VITALS — SYSTOLIC BLOOD PRESSURE: 128 MMHG | DIASTOLIC BLOOD PRESSURE: 68 MMHG

## 2018-09-03 VITALS — SYSTOLIC BLOOD PRESSURE: 100 MMHG | DIASTOLIC BLOOD PRESSURE: 48 MMHG

## 2018-09-03 VITALS — DIASTOLIC BLOOD PRESSURE: 62 MMHG | SYSTOLIC BLOOD PRESSURE: 124 MMHG

## 2018-09-03 VITALS — DIASTOLIC BLOOD PRESSURE: 50 MMHG | SYSTOLIC BLOOD PRESSURE: 111 MMHG

## 2018-09-03 LAB
ANION GAP SERPL CALC-SCNC: 6 MMOL/L (ref 7–16)
BUN SERPL-MCNC: 39 MG/DL (ref 7–18)
CALCIUM SERPL-MCNC: 7.9 MG/DL (ref 8.5–10.1)
CHLORIDE SERPL-SCNC: 99 MMOL/L (ref 98–107)
CO2 SERPL-SCNC: 31 MMOL/L (ref 21–32)
CREAT SERPL-MCNC: 4.3 MG/DL (ref 0.6–1.3)
GLUCOSE SERPL-MCNC: 113 MG/DL (ref 70–99)
HCT VFR BLD CALC: 24.3 % (ref 42–52)
HGB BLD-MCNC: 8.2 GM/DL (ref 14–18)
INR PPP: 5.3
IRON SERPL-MCNC: 43 UG/DL (ref 50–175)
MCH RBC QN AUTO: 29.7 PG (ref 26–34)
MCHC RBC AUTO-ENTMCNC: 33.5 G/DL (ref 28–37)
MCV RBC: 88.4 FL (ref 80–100)
MPV: 7 FL. (ref 7.2–11.1)
PLATELET COUNT*: 245 THOU/UL (ref 150–400)
POTASSIUM SERPL-SCNC: 4.2 MMOL/L (ref 3.5–5.1)
PROTHROMBIN TIME: 53.9 SECONDS (ref 9.2–11.5)
RBC # BLD AUTO: 2.75 MIL/UL (ref 4.5–6)
RDW-CV: 19.3 % (ref 10.5–14.5)
SAO2 % BLD FROM PO2: 21 % (ref 20–39)
SODIUM SERPL-SCNC: 136 MMOL/L (ref 136–145)
TROPONIN-I LEVEL: 0.12 NG/ML (ref ?–0.06)
WBC # BLD AUTO: 8.4 THOU/UL (ref 4–11)

## 2018-09-03 NOTE — NUR
A&O X4 CONFUSED AT TIMES NEED REORINTATION. PT BED BOUND/CHAIR FAST. COMPLETLE
DEPENDENT TRANSFER. RA. SR ON THE MONITOR. VITALS WNL. SEE MAR. SEE CHARTING.
HOURLY ROUNDING FOR SAFETY. FALL PRECAUTIONS IN PLACE.

## 2018-09-04 VITALS — DIASTOLIC BLOOD PRESSURE: 44 MMHG | SYSTOLIC BLOOD PRESSURE: 98 MMHG

## 2018-09-04 VITALS — SYSTOLIC BLOOD PRESSURE: 123 MMHG | DIASTOLIC BLOOD PRESSURE: 48 MMHG

## 2018-09-04 VITALS — SYSTOLIC BLOOD PRESSURE: 129 MMHG | DIASTOLIC BLOOD PRESSURE: 56 MMHG

## 2018-09-04 VITALS — DIASTOLIC BLOOD PRESSURE: 45 MMHG | SYSTOLIC BLOOD PRESSURE: 111 MMHG

## 2018-09-04 LAB
ALBUMIN SERPL-MCNC: 2.1 G/DL (ref 3.4–5)
ALP SERPL-CCNC: 70 U/L (ref 46–116)
ALT SERPL-CCNC: 15 U/L (ref 30–65)
ANION GAP SERPL CALC-SCNC: 8 MMOL/L (ref 7–16)
AST SERPL-CCNC: 28 U/L (ref 15–37)
BILIRUB SERPL-MCNC: 0.6 MG/DL
BUN SERPL-MCNC: 50 MG/DL (ref 7–18)
CALCIUM SERPL-MCNC: 7.9 MG/DL (ref 8.5–10.1)
CHLORIDE SERPL-SCNC: 98 MMOL/L (ref 98–107)
CO2 SERPL-SCNC: 30 MMOL/L (ref 21–32)
CREAT SERPL-MCNC: 5.4 MG/DL (ref 0.6–1.3)
GLUCOSE SERPL-MCNC: 115 MG/DL (ref 70–99)
INR PPP: 5.5
POTASSIUM SERPL-SCNC: 4.4 MMOL/L (ref 3.5–5.1)
PROT SERPL-MCNC: 5.1 G/DL (ref 6.4–8.2)
PROTHROMBIN TIME: 55.8 SECONDS (ref 9.2–11.5)
SODIUM SERPL-SCNC: 136 MMOL/L (ref 136–145)

## 2018-09-04 NOTE — NUR
PATIENT HAS BEEN ALERT AND ORIENTED TODAY. UP WITH THERAPY TO COMMODE AND
CHAIR TODAY, TOLERATED WELL. APPETITE GOOD THIS MORNING FOR BREAKFAST. SOME
PAIN THAT IS CONTROLLED WITH ORAL PAIN MEDICATIONS. PATIENT WENT TO DIALYSIS
TODAY AT 1400. CALL LIGHT IS IN REACH, WILL CONTINUE TO MONITOR.

## 2018-09-04 NOTE — NUR
RECEIVED CONSULT FOR POSSIBLE REHAB ADMISSION. CONSULT HAS BEEN ACKNOWLEDGED
BY REHAB LIAISON AND DR. SANCHEZ. PT IS CHELSEY KEARNEY. OT/ST EVALUATIONS ARE PENDING. PT
EVALUATION PATIENT ONLY ABLE TO SIT EOB FOR SHORT TIME. NEED MUCH
ENCOURAGEMENT AND REFUSED SOME ACTIVITIES. WILL AWAIT OT/ST EVALUATIONS AND
SEE HOW PATIENT PROGRESSES WITH ALL THERAPIES. UNSURE PATIENT WILL BE ABLE OR
WILLING TO PARTICIPATE IN 3 HOURS OF THERAPY PER DAY. PATIENT MAY BENEFIT FROM
SKILLED VS REHAB ONCE MEDICALLY STABLE. THANK YOU FOR THIS CONSULT.

## 2018-09-04 NOTE — NUR
A&O X4 CALM COOPERITIVE, CONFUSED AT TIME CAN BE REORIENTATED. [id number]. PT
REMOVED BOOT FOR WOUND, WAS REPLACED IN AM. MED SURGE STATUS. VITALS WNL. SEE
MAR. SEE CHARTING. VITALS WNL. FALL PRECAUTIONS IN PLACE. HOURLY ROUNDING FOR
SAFETY.

## 2018-09-05 ENCOUNTER — HOSPITAL ENCOUNTER (INPATIENT)
Dept: HOSPITAL 96 - M.REH | Age: 83
LOS: 19 days | Discharge: NURSING, REHAB OR LONG-TERM CARE | DRG: 299 | End: 2018-09-24
Attending: PHYSICAL MEDICINE & REHABILITATION | Admitting: PHYSICAL MEDICINE & REHABILITATION
Payer: MEDICARE

## 2018-09-05 VITALS — DIASTOLIC BLOOD PRESSURE: 46 MMHG | SYSTOLIC BLOOD PRESSURE: 101 MMHG

## 2018-09-05 VITALS — BODY MASS INDEX: 17.07 KG/M2 | HEIGHT: 72.01 IN | WEIGHT: 126 LBS

## 2018-09-05 VITALS — SYSTOLIC BLOOD PRESSURE: 108 MMHG | DIASTOLIC BLOOD PRESSURE: 56 MMHG

## 2018-09-05 VITALS — SYSTOLIC BLOOD PRESSURE: 98 MMHG | DIASTOLIC BLOOD PRESSURE: 40 MMHG

## 2018-09-05 VITALS — DIASTOLIC BLOOD PRESSURE: 54 MMHG | SYSTOLIC BLOOD PRESSURE: 115 MMHG

## 2018-09-05 DIAGNOSIS — I95.9: ICD-10-CM

## 2018-09-05 DIAGNOSIS — Z87.891: ICD-10-CM

## 2018-09-05 DIAGNOSIS — Z95.2: ICD-10-CM

## 2018-09-05 DIAGNOSIS — Z99.2: ICD-10-CM

## 2018-09-05 DIAGNOSIS — E78.5: ICD-10-CM

## 2018-09-05 DIAGNOSIS — D63.1: ICD-10-CM

## 2018-09-05 DIAGNOSIS — Z95.0: ICD-10-CM

## 2018-09-05 DIAGNOSIS — I50.22: ICD-10-CM

## 2018-09-05 DIAGNOSIS — Z79.4: ICD-10-CM

## 2018-09-05 DIAGNOSIS — Z79.01: ICD-10-CM

## 2018-09-05 DIAGNOSIS — Z89.411: ICD-10-CM

## 2018-09-05 DIAGNOSIS — E11.22: ICD-10-CM

## 2018-09-05 DIAGNOSIS — Z88.0: ICD-10-CM

## 2018-09-05 DIAGNOSIS — Z91.040: ICD-10-CM

## 2018-09-05 DIAGNOSIS — Z95.5: ICD-10-CM

## 2018-09-05 DIAGNOSIS — Z79.899: ICD-10-CM

## 2018-09-05 DIAGNOSIS — I25.2: ICD-10-CM

## 2018-09-05 DIAGNOSIS — L97.519: ICD-10-CM

## 2018-09-05 DIAGNOSIS — Z89.611: ICD-10-CM

## 2018-09-05 DIAGNOSIS — I25.10: ICD-10-CM

## 2018-09-05 DIAGNOSIS — E11.52: Primary | ICD-10-CM

## 2018-09-05 DIAGNOSIS — E83.39: ICD-10-CM

## 2018-09-05 DIAGNOSIS — R53.81: ICD-10-CM

## 2018-09-05 DIAGNOSIS — Z79.82: ICD-10-CM

## 2018-09-05 DIAGNOSIS — Z89.422: ICD-10-CM

## 2018-09-05 DIAGNOSIS — N18.6: ICD-10-CM

## 2018-09-05 DIAGNOSIS — Z88.8: ICD-10-CM

## 2018-09-05 DIAGNOSIS — N17.9: ICD-10-CM

## 2018-09-05 DIAGNOSIS — Z89.421: ICD-10-CM

## 2018-09-05 DIAGNOSIS — D50.9: ICD-10-CM

## 2018-09-05 DIAGNOSIS — L03.115: ICD-10-CM

## 2018-09-05 DIAGNOSIS — E11.621: ICD-10-CM

## 2018-09-05 LAB
INR PPP: 3.4
PROTHROMBIN TIME: 34.6 SECONDS (ref 9.2–11.5)

## 2018-09-05 PROCEDURE — 5A1D70Z PERFORMANCE OF URINARY FILTRATION, INTERMITTENT, LESS THAN 6 HOURS PER DAY: ICD-10-PCS | Performed by: INTERNAL MEDICINE

## 2018-09-05 NOTE — NUR
Spoke with rehab liaison yesterday, rehab is considering Pt for acute rehab.
Anticipate dc soon. Following.

## 2018-09-05 NOTE — OP
[address]  [address]                    OPERATIVE REPORT              
_______________________________________________________________________________
 
Name:       JENNIFERYAKOV F             Room:           [address] [place]  
.R.#:  [id number]      Account #:      [id number]  
Admission:  08/22/18     Attend Phys:    Fernando Abel MD 
Discharge:               Date of Birth:  [birth year]  
         Report #: [id number]
                                                                     [id number]  
_______________________________________________________________________________
THIS REPORT FOR:  //name//                      
 
CC: Fernando Morelande Urvashi
 
DATE OF SERVICE:  08/28/2018
 
 
TRANSCUTANEOUS OXYGEN MEASUREMENT REPORT
 
INDICATION:  For non-healing right lower extremity wounds, severe PAD.
 
FINDINGS:  TCOM measurements at baseline site one is 58, site two is 42, site
three is 48, site four is 40, site five is 33 and site six is 64.  He did have
some slight augmentation with oxygen supplementation.
 
IMPRESSION:  Transcutaneous oxygen measurements suggest poor oxygenation below
the knee and likely inadequate for wound healing.
 
 
 
 
 
 
 
 
 
 
 
 
 
 
 
 
 
 
 
 
 
 
 
 
 
<ELECTRONICALLY SIGNED>
                                        By:  Shaggy Sandra DO          
09/05/18     1244
D: 09/05/18 1143_______________________________________
T: 09/05/18 1232Anahum Sandra DO             /nt

## 2018-09-05 NOTE — NUR
ASSUMED CARE OF PT THIS AM AROUND 0715- MS STATUS IN PLACE AND MAINTAINED-
UPON ASSESSMENT PT NOTED TO BE RESTING IN BED, FAMILY AT SIDE VISITTING-
BEDREST IN PLACE, Q 2 HOUR TURNS- CONTINENT OF BOWEL AND BLADDER- LCTA, RESP
EVEN AND [id number], O2 SAT 97% ON RA- ABDOMEN SOFT/ROUND/NON-TENDER, BS
X4 QUADS- LAST BM REPORTED X3 DAYS AGO- IV NOTED TO RIGHT FA, PT REFUSES TO
HAVE CHANGED- FAIR PO INTAKE NOTED THIS AM WITH BREAKFAST, BS MONITORED AS
PRESCIBED- RIGHT AKA NOTED WITH ACE WRAP IN PLACE, LLE WITH BOOT IN PLACE,
DRESSING IN PLACE TO LLE WITH FOAL DRESSING NOTED TO LATERAL LEFT KNEE- PT
DENIES ANY C/O PAIN/DISCOMFORT- CALL LIGHT AND PERSONAL BELONGINGS WITH IN
REACH- HOURLY ROUNDS IN PLACE R/T SAFETY/NEEDS- ALL NEEDS MET AT THIS
TIME-WCTM

## 2018-09-05 NOTE — PATH
[address]  [address]                    PATHOLOGY RPT PROCEDURE       
_______________________________________________________________________________
 
Name:       YAKOV RODRIGUEZ             Room:           [address] [place]  
M.R.#:  [id number]      Account #:      [id number]  
Admission:  08/22/18     Date of Birth:  [birth year]  
Discharge:                             Report #:    [id number]
                                                         Path Case #: [id number]
_______________________________________________________________________________
 
L[facility] Accession Number: [id number]
.                                                                01
Material submitted:                                        .
RIGHT ABOVE THE KNEE AMPUTATION
.                                                                01
Clinical history:                                          .
None provided
.                                                                02
**********************************************************************
Diagnosis:
Leg, right above the knee amputation:
- Gangrenous necrosis associated with surface ulceration.
- Acute inflammation extending into underlying subcutaneous tissue and
bone.
- Skin and bone margin viable and unremarkable.
- Vascular margin unremarkable.
- Vessels showing calcific sclerosis.
(IUV:niyah; 09/04/2018)
QMS/09/05/2018
**********************************************************************
.                                                                02
Electronically signed:                                     .
Keisha Haynes MD, Pathologist
NPI- [id number]
.                                                                01
Gross description:                                         .
Received in a red biohazard bag, labeled "Yakov Rodriguez and right
above the knee amputation", is a above the knee amputation of right leg
measuring 16 cm from proximal skin resection margin to heel and 22 cm from
heel to distal tip of third toe.  Extending above the soft tissue
resection margin is a segment of femur measuring 7.0 cm in length by 2.8 x
2.8 cm.  The skin, soft tissue and bone at the margin appear viable.  The
femoral artery at the margin is patent.  The first and second toes are
previously amputated.  The fourth digit is black-brown and mummified.
There is a 3.2 x 2.0 cm ulcer with gray-white and necrotic ulcer bed
adjacent to the fourth digit on the plantar aspect. The heel has 3.5 x 3.0
cm hyperemic and edematous area. Sectioning through the fourth digit and
plantar ulcer show a necrotic skin and underlying soft tissue with
possible extension to the bone.  The anterior and posterior vessels shows
intimal thickening.
Representative sections are submitted as follows:
A1.  Margin, skin, underlying soft tissue and muscle
A2.  Margin Femur after decalcification
A3.  Margin, Vascular
A4.  Third toe, cross-section after decalcification
A5.  Plantar Ulcer, after decalcification
A6.  Heel
A7.  Anterior and posterior vessels and dorsalis [place] [address]                    PATHOLOGY RPT PROCEDURE       
_______________________________________________________________________________
 
Name:       YAKOV RODRIGUEZ             Room:           [address] [place]  
[facility].#:  [id number]      Account #:      [id number]  
Admission:  08/22/18     Date of Birth:  [birth year]  
Discharge:                             Report #:    [id number]
                                                         Path Case #: [id number]
_______________________________________________________________________________
([facility]; 08/31/2018)
SHS/SHS
.                                                                02
Pathologist provided ICD-10:
I70.261
.                                                                02
CPT                                                        .
591693, [id number]
***Performed at:  01
   [facility] [id number] [facility] [address], [place], [place]  [id number]
   MD John Yates MD Phone:  [phone]
***Performed at:  02
   [address]  [id number]
   MD Keisha Haynes MD Phone:  [phone]

## 2018-09-05 NOTE — NUR
PATIENT RESTED IN BED, NO ACUTE CHANGES. PATIENT DID NOT SHOW SIGNS OF
DISTRESS. FALL PRECAUTIONS IN PLACE, BED ALARM ON, CALL LIGHT WITH IN REACH,
HOURLY ROUNDING OBSERVED. PATIENT OFFER TURNS Q TWO HOURS.

## 2018-09-06 VITALS — DIASTOLIC BLOOD PRESSURE: 52 MMHG | SYSTOLIC BLOOD PRESSURE: 142 MMHG

## 2018-09-06 LAB
ANION GAP SERPL CALC-SCNC: 7 MMOL/L (ref 7–16)
BUN SERPL-MCNC: 33 MG/DL (ref 7–18)
CALCIUM SERPL-MCNC: 8.2 MG/DL (ref 8.5–10.1)
CHLORIDE SERPL-SCNC: 100 MMOL/L (ref 98–107)
CO2 SERPL-SCNC: 30 MMOL/L (ref 21–32)
CREAT SERPL-MCNC: 4 MG/DL (ref 0.6–1.3)
GLUCOSE SERPL-MCNC: 102 MG/DL (ref 70–99)
HCT VFR BLD CALC: 24 % (ref 42–52)
HGB BLD-MCNC: 8 GM/DL (ref 14–18)
MCH RBC QN AUTO: 29.7 PG (ref 26–34)
MCHC RBC AUTO-ENTMCNC: 33.3 G/DL (ref 28–37)
MCV RBC: 89.4 FL (ref 80–100)
MPV: 6.8 FL. (ref 7.2–11.1)
PLATELET COUNT*: 249 THOU/UL (ref 150–400)
POTASSIUM SERPL-SCNC: 4.1 MMOL/L (ref 3.5–5.1)
RBC # BLD AUTO: 2.68 MIL/UL (ref 4.5–6)
RDW-CV: 20.8 % (ref 10.5–14.5)
SODIUM SERPL-SCNC: 137 MMOL/L (ref 136–145)
WBC # BLD AUTO: 9.7 THOU/UL (ref 4–11)

## 2018-09-07 VITALS — DIASTOLIC BLOOD PRESSURE: 53 MMHG | SYSTOLIC BLOOD PRESSURE: 104 MMHG

## 2018-09-07 VITALS — DIASTOLIC BLOOD PRESSURE: 110 MMHG | SYSTOLIC BLOOD PRESSURE: 142 MMHG

## 2018-09-08 VITALS — SYSTOLIC BLOOD PRESSURE: 118 MMHG | DIASTOLIC BLOOD PRESSURE: 48 MMHG

## 2018-09-08 VITALS — DIASTOLIC BLOOD PRESSURE: 58 MMHG | SYSTOLIC BLOOD PRESSURE: 118 MMHG

## 2018-09-08 LAB
ALBUMIN SERPL-MCNC: 2.1 G/DL (ref 3.4–5)
ANION GAP SERPL CALC-SCNC: 6 MMOL/L (ref 7–16)
BUN SERPL-MCNC: 25 MG/DL (ref 7–18)
CALCIUM SERPL-MCNC: 8.2 MG/DL (ref 8.5–10.1)
CHLORIDE SERPL-SCNC: 101 MMOL/L (ref 98–107)
CO2 SERPL-SCNC: 33 MMOL/L (ref 21–32)
CREAT SERPL-MCNC: 3.7 MG/DL (ref 0.6–1.3)
GLUCOSE SERPL-MCNC: 77 MG/DL (ref 70–99)
PHOSPHATE SERPL-MCNC: 3.4 MG/DL (ref 2.5–4.9)
POTASSIUM SERPL-SCNC: 4.1 MMOL/L (ref 3.5–5.1)
SODIUM SERPL-SCNC: 140 MMOL/L (ref 136–145)

## 2018-09-08 PROCEDURE — 5A1D70Z PERFORMANCE OF URINARY FILTRATION, INTERMITTENT, LESS THAN 6 HOURS PER DAY: ICD-10-PCS | Performed by: PHYSICAL MEDICINE & REHABILITATION

## 2018-09-09 VITALS — DIASTOLIC BLOOD PRESSURE: 53 MMHG | SYSTOLIC BLOOD PRESSURE: 114 MMHG

## 2018-09-09 VITALS — DIASTOLIC BLOOD PRESSURE: 56 MMHG | SYSTOLIC BLOOD PRESSURE: 121 MMHG

## 2018-09-10 VITALS — SYSTOLIC BLOOD PRESSURE: 127 MMHG | DIASTOLIC BLOOD PRESSURE: 61 MMHG

## 2018-09-10 VITALS — SYSTOLIC BLOOD PRESSURE: 115 MMHG | DIASTOLIC BLOOD PRESSURE: 54 MMHG

## 2018-09-10 LAB
ALBUMIN SERPL-MCNC: 2.1 G/DL (ref 3.4–5)
ALP SERPL-CCNC: 63 U/L (ref 46–116)
ALT SERPL-CCNC: 16 U/L (ref 30–65)
ANION GAP SERPL CALC-SCNC: 6 MMOL/L (ref 7–16)
AST SERPL-CCNC: 31 U/L (ref 15–37)
BILIRUB SERPL-MCNC: 0.7 MG/DL
BUN SERPL-MCNC: 25 MG/DL (ref 7–18)
CALCIUM SERPL-MCNC: 7.9 MG/DL (ref 8.5–10.1)
CHLORIDE SERPL-SCNC: 101 MMOL/L (ref 98–107)
CO2 SERPL-SCNC: 31 MMOL/L (ref 21–32)
CREAT SERPL-MCNC: 3.5 MG/DL (ref 0.6–1.3)
GLUCOSE SERPL-MCNC: 89 MG/DL (ref 70–99)
HCT VFR BLD CALC: 25.6 % (ref 42–52)
HGB BLD-MCNC: 8.3 GM/DL (ref 14–18)
INR PPP: 2
MAGNESIUM SERPL-MCNC: 1.9 MG/DL (ref 1.8–2.4)
MCH RBC QN AUTO: 29.9 PG (ref 26–34)
MCHC RBC AUTO-ENTMCNC: 32.4 G/DL (ref 28–37)
MCV RBC: 92.3 FL (ref 80–100)
MPV: 6.7 FL. (ref 7.2–11.1)
PLATELET COUNT*: 232 THOU/UL (ref 150–400)
POTASSIUM SERPL-SCNC: 3.4 MMOL/L (ref 3.5–5.1)
PROT SERPL-MCNC: 5.1 G/DL (ref 6.4–8.2)
PROTHROMBIN TIME: 20.1 SECONDS (ref 9.2–11.5)
RBC # BLD AUTO: 2.77 MIL/UL (ref 4.5–6)
RDW-CV: 23.6 % (ref 10.5–14.5)
SODIUM SERPL-SCNC: 138 MMOL/L (ref 136–145)
WBC # BLD AUTO: 8.4 THOU/UL (ref 4–11)

## 2018-09-11 VITALS — SYSTOLIC BLOOD PRESSURE: 143 MMHG | DIASTOLIC BLOOD PRESSURE: 60 MMHG

## 2018-09-11 VITALS — SYSTOLIC BLOOD PRESSURE: 103 MMHG | DIASTOLIC BLOOD PRESSURE: 57 MMHG

## 2018-09-11 PROCEDURE — 5A1D70Z PERFORMANCE OF URINARY FILTRATION, INTERMITTENT, LESS THAN 6 HOURS PER DAY: ICD-10-PCS | Performed by: PHYSICAL MEDICINE & REHABILITATION

## 2018-09-12 VITALS — DIASTOLIC BLOOD PRESSURE: 48 MMHG | SYSTOLIC BLOOD PRESSURE: 99 MMHG

## 2018-09-12 VITALS — SYSTOLIC BLOOD PRESSURE: 111 MMHG | DIASTOLIC BLOOD PRESSURE: 52 MMHG

## 2018-09-12 LAB
INR PPP: 1.9
PROTHROMBIN TIME: 19.7 SECONDS (ref 9.2–11.5)

## 2018-09-12 NOTE — PLAN
[facility] 
[address] [place]  [address]                    REHAB UNIT PLAN OF CARE       
_______________________________________________________________________________
 
Name:       YAKOV RODRIGUEZ             Room:           [address] [place]  
[facility].#:  [id number]      Account #:      [id number]  
Admission:  09/05/18     Attend Phys:    Silvana Chen DO   
Discharge:               Date of Birth:  [birth year]  
         Report #: [id number]
                                                                     [id number]  
_______________________________________________________________________________
THIS REPORT FOR:  //name//                      
 
CC: Silvana Landin
 
This is an 85-year-old male admitted to inpatient rehabilitation to facilitate
safe discharge home, status post prolonged acute hospitalization for nonhealing
right lower extremity wound, which then became gangrenous.  Ultimately, a right
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  He did go into acute
renal failure, is currently on dialysis Tuesdays, Thursdays and Saturdays. 
Previous level of function was modified independent to independent with
activities of daily living.  Current level of function is minimum to moderate
assistance of 1-2 depending on therapy, activity and time of day.  Estimated
length of stay is 14-16 days with discharge disposition to the home setting
where he has supportive family and an accessible house.  Medical prognosis is
fair.  Rehabilitation prognosis is fair.  He has needs in all 3 disciplines as
well as multiple medical comorbidities requiring acute daily medical care.  The
patient will participate in a low endurance protocol given his significant
debility and dialysis schedule.
 
Physical therapy will see the patient 60-90 minutes per day, 5 days per week,
working on upper and lower body strength, balance, coordination, navigation,
transfers and wheelchair mobility.
 
Occupational therapy will work with the patient 60-90 minutes per day, 5 days
per week, working on upper and lower body strength, balance, coordination,
navigation, bathing, dressing, toileting.
 
Speech and language pathology will work with the patient 30-90 minutes per day,
5 days per week, working on memory, cognition, social interaction.
 
This is an overall plan of care, may change from time to time, we will team
weekly and make changes to plan of care as needed.
 
 
 
 
 
 
 
 
 
 
 
<ELECTRONICALLY SIGNED>
                                        By:  Silvana Chen DO            
09/12/18     1347
D: 09/06/18 0941_______________________________________
T: 09/06/18 2143Kvu Chen DO               /nt

## 2018-09-13 VITALS — SYSTOLIC BLOOD PRESSURE: 109 MMHG | DIASTOLIC BLOOD PRESSURE: 52 MMHG

## 2018-09-13 VITALS — DIASTOLIC BLOOD PRESSURE: 51 MMHG | SYSTOLIC BLOOD PRESSURE: 114 MMHG

## 2018-09-13 LAB
INR PPP: 2.7
PROTHROMBIN TIME: 27.4 SECONDS (ref 9.2–11.5)

## 2018-09-13 PROCEDURE — 5A1D70Z PERFORMANCE OF URINARY FILTRATION, INTERMITTENT, LESS THAN 6 HOURS PER DAY: ICD-10-PCS | Performed by: PHYSICAL MEDICINE & REHABILITATION

## 2018-09-14 VITALS — DIASTOLIC BLOOD PRESSURE: 58 MMHG | SYSTOLIC BLOOD PRESSURE: 118 MMHG

## 2018-09-14 VITALS — SYSTOLIC BLOOD PRESSURE: 73 MMHG | DIASTOLIC BLOOD PRESSURE: 28 MMHG

## 2018-09-14 LAB
INR PPP: 3.5
PROTHROMBIN TIME: 35.1 SECONDS (ref 9.2–11.5)

## 2018-09-15 VITALS — SYSTOLIC BLOOD PRESSURE: 112 MMHG | DIASTOLIC BLOOD PRESSURE: 55 MMHG

## 2018-09-15 VITALS — SYSTOLIC BLOOD PRESSURE: 113 MMHG | DIASTOLIC BLOOD PRESSURE: 58 MMHG

## 2018-09-15 LAB
ABSOLUTE BASOPHILS: 0.2 THOU/UL (ref 0–0.2)
ABSOLUTE EOSINOPHILS: 0.1 THOU/UL (ref 0–0.7)
ABSOLUTE MONOCYTES: 0.4 THOU/UL (ref 0–1.2)
ANION GAP SERPL CALC-SCNC: 6 MMOL/L (ref 7–16)
ANISOCYTOSIS BLD QL SMEAR: (no result)
BASOPHILS NFR BLD AUTO: 2 %
BUN SERPL-MCNC: 36 MG/DL (ref 7–18)
CALCIUM SERPL-MCNC: 8.6 MG/DL (ref 8.5–10.1)
CHLORIDE SERPL-SCNC: 97 MMOL/L (ref 98–107)
CO2 SERPL-SCNC: 33 MMOL/L (ref 21–32)
CREAT SERPL-MCNC: 3.7 MG/DL (ref 0.6–1.3)
EOSINOPHIL NFR BLD: 1 %
GLUCOSE SERPL-MCNC: 138 MG/DL (ref 70–99)
GRANULOCYTES NFR BLD MANUAL: 84 %
HCT VFR BLD CALC: 30.4 % (ref 42–52)
HGB BLD-MCNC: 9.7 GM/DL (ref 14–18)
INR PPP: 4.3
LYMPHOCYTES # BLD: 0.6 THOU/UL (ref 0.8–5.3)
LYMPHOCYTES NFR BLD AUTO: 8 %
MCH RBC QN AUTO: 29.9 PG (ref 26–34)
MCHC RBC AUTO-ENTMCNC: 31.8 G/DL (ref 28–37)
MCV RBC: 94 FL (ref 80–100)
MONOCYTES NFR BLD: 5 %
MPV: 6.9 FL. (ref 7.2–11.1)
NEUTROPHILS # BLD: 6.8 THOU/UL (ref 1.6–8.1)
NUCLEATED RBCS: 0 /100WBC
PLATELET # BLD EST: ADEQUATE 10*3/UL
PLATELET COUNT*: 233 THOU/UL (ref 150–400)
POTASSIUM SERPL-SCNC: 4.2 MMOL/L (ref 3.5–5.1)
PROTHROMBIN TIME: 43.2 SECONDS (ref 9.2–11.5)
RBC # BLD AUTO: 3.23 MIL/UL (ref 4.5–6)
RDW-CV: 24.1 % (ref 10.5–14.5)
SODIUM SERPL-SCNC: 136 MMOL/L (ref 136–145)
WBC # BLD AUTO: 8.1 THOU/UL (ref 4–11)

## 2018-09-15 PROCEDURE — 5A1D70Z PERFORMANCE OF URINARY FILTRATION, INTERMITTENT, LESS THAN 6 HOURS PER DAY: ICD-10-PCS | Performed by: PHYSICAL MEDICINE & REHABILITATION

## 2018-09-16 VITALS — DIASTOLIC BLOOD PRESSURE: 58 MMHG | SYSTOLIC BLOOD PRESSURE: 114 MMHG

## 2018-09-16 VITALS — DIASTOLIC BLOOD PRESSURE: 49 MMHG | SYSTOLIC BLOOD PRESSURE: 109 MMHG

## 2018-09-16 LAB
INR PPP: 3.1
IRON SERPL-MCNC: 77 UG/DL (ref 50–175)
PROTHROMBIN TIME: 31.1 SECONDS (ref 9.2–11.5)
SAO2 % BLD FROM PO2: 29 % (ref 20–39)

## 2018-09-17 VITALS — SYSTOLIC BLOOD PRESSURE: 118 MMHG | DIASTOLIC BLOOD PRESSURE: 66 MMHG

## 2018-09-17 VITALS — DIASTOLIC BLOOD PRESSURE: 57 MMHG | SYSTOLIC BLOOD PRESSURE: 140 MMHG

## 2018-09-17 LAB
INR PPP: 2.9
PROTHROMBIN TIME: 29.3 SECONDS (ref 9.2–11.5)

## 2018-09-18 VITALS — DIASTOLIC BLOOD PRESSURE: 52 MMHG | SYSTOLIC BLOOD PRESSURE: 113 MMHG

## 2018-09-18 VITALS — DIASTOLIC BLOOD PRESSURE: 72 MMHG | SYSTOLIC BLOOD PRESSURE: 123 MMHG

## 2018-09-18 VITALS — SYSTOLIC BLOOD PRESSURE: 123 MMHG | DIASTOLIC BLOOD PRESSURE: 72 MMHG

## 2018-09-18 LAB
INR PPP: 2.1
PROTHROMBIN TIME: 21.1 SECONDS (ref 9.2–11.5)

## 2018-09-18 PROCEDURE — 5A1D70Z PERFORMANCE OF URINARY FILTRATION, INTERMITTENT, LESS THAN 6 HOURS PER DAY: ICD-10-PCS | Performed by: PHYSICAL MEDICINE & REHABILITATION

## 2018-09-19 VITALS — DIASTOLIC BLOOD PRESSURE: 57 MMHG | SYSTOLIC BLOOD PRESSURE: 105 MMHG

## 2018-09-19 VITALS — DIASTOLIC BLOOD PRESSURE: 63 MMHG | SYSTOLIC BLOOD PRESSURE: 126 MMHG

## 2018-09-20 VITALS — SYSTOLIC BLOOD PRESSURE: 99 MMHG | DIASTOLIC BLOOD PRESSURE: 54 MMHG

## 2018-09-20 VITALS — SYSTOLIC BLOOD PRESSURE: 112 MMHG | DIASTOLIC BLOOD PRESSURE: 52 MMHG

## 2018-09-20 VITALS — SYSTOLIC BLOOD PRESSURE: 105 MMHG | DIASTOLIC BLOOD PRESSURE: 57 MMHG

## 2018-09-20 LAB
INR PPP: 2.3
PROTHROMBIN TIME: 23.4 SECONDS (ref 9.2–11.5)

## 2018-09-20 PROCEDURE — 5A1D70Z PERFORMANCE OF URINARY FILTRATION, INTERMITTENT, LESS THAN 6 HOURS PER DAY: ICD-10-PCS | Performed by: PHYSICAL MEDICINE & REHABILITATION

## 2018-09-20 NOTE — H
[place] [address]                    HISTORY AND PHYSICAL          
_______________________________________________________________________________
 
Name:       JENNIFERYAKOV F             Room:           [address] [place]  
..#:  [id number]      Account #:      [id number]  
Admission:  09/05/18     Attend Phys:    Silvana Chen DO   
Discharge:               Date of Birth:  [birth year]  
         Report #: [id number]
                                                                     [id number]  
_______________________________________________________________________________
THIS REPORT FOR:  //name//                      
 
CC: Silvana Landin
 
DATE OF SERVICE:  09/05/2018
 
 
HISTORY OF PRESENT ILLNESS:  This is an 85-year-old male admitted to inpatient
rehabilitation to facilitate safe discharge home, status post acute and complex
hospitalization from a nonhealing ulcer over the right lower extremity.  He was
positive for Staph, progressed to gangrene, was started on vancomycin, developed
acute renal failure, is now currently on dialysis Tuesdays, Thursdays and
Saturdays.  Eventually, an above knee amputation was completed on 08/29/2018 due
to ongoing infections, severe peripheral vascular disease.  He has been working
with physical and occupational therapy, is appropriate for acute inpatient
rehabilitation and this was discussed with both him and his family.  He does
have some needs in speech and language pathology as well.  There have been no
significant changes since the preadmission screening.  Previous level of
function prior to this acute hospitalization was modified independent to
independent with activities of daily living.  Current level of function is
minimum to moderate assistance of 1-2 depending on therapy, activity and time of
day.  Estimated length of stay is 14-16 days with discharge disposition to the
home setting where he has supportive family and an accessible house.  He does
have multiple medical comorbidities requiring acute daily medical care including
end-stage renal disease on dialysis, diabetes type 2, atrial fibrillation and
anemia.
 
ALLERGIES:  ACE INHIBITORS, LATEX, PENICILLINS.
 
MEDICATIONS:  Reviewed and reconciled by myself and are available in the MAR.
 
SOCIAL HISTORY:  Unchanged from previous.
 
FAMILY HISTORY:  Unchanged from previous.
 
REVIEW OF SYSTEMS:  A 14-point review of systems is done and is negative except
as mentioned in HPI, specifically no fever, chest pain, shortness of breath,
abdominal pain or distention.
 
PHYSICAL EXAMINATION:
GENERAL:  Alert and oriented, no apparent distress, up in the chair, no family
but a friend is at the bedside.
EARS, EYES, NOSE, THROAT:  PERRLA and EOMI.
EXTREMITIES:  Nontender.  Incision is not visualized at this time as this has
freshly been dressed.
 
 
 
[place] [address]                    HISTORY AND PHYSICAL          
_______________________________________________________________________________
 
Name:       AYKOV RODRIGUEZ             Room:           [address]#:  [id number]      Account #:      [id number]  
Admission:  09/05/18     Attend Phys:    Silvana Chen DO   
Discharge:               Date of Birth:  [birth year]  
         Report #: [id number]
                                                                     [id number]  
_______________________________________________________________________________
NEUROLOGIC:  Cranial nerves 2-12 are grossly intact.
PSYCHIATRIC:  He has normal mood and affect.
SKIN:  Warm and dry.  No rashes or lesions noted.
 
ASSESSMENT:
1.  Recent right above knee amputation due to nonhealing gangrenous lower
extremity.
2.  Acute renal failure, on dialysis Tuesday, Thursday and Saturday.
3.  Debility with alterations in activities of daily living from previously
moderate independent to modified independent.
 
PLAN:
1.  PT, OT, speech, language, case management, nursing and HIMS to make
evaluations and recommendations.
2.  Low endurance protocol given dialysis and prolonged hospitalization.
3.  Nonweightbearing on the right lower extremity.
4.  We will consult the wound nurse Nephrology for dialysis and vascular if
needed.
 
Plan of care is pending and we will team him weekly.
 
 
 
 
 
 
 
 
 
 
 
 
 
 
 
 
 
 
 
 
 
 
 
 
<ELECTRONICALLY SIGNED>
                                        By:  Silvana Chen DO            
09/20/18     1319
D: 09/06/18 0938_______________________________________
T: 09/06/18 1115Silvana Chen DO               /nt

## 2018-09-21 VITALS — DIASTOLIC BLOOD PRESSURE: 56 MMHG | SYSTOLIC BLOOD PRESSURE: 129 MMHG

## 2018-09-21 VITALS — DIASTOLIC BLOOD PRESSURE: 55 MMHG | SYSTOLIC BLOOD PRESSURE: 102 MMHG

## 2018-09-22 VITALS — DIASTOLIC BLOOD PRESSURE: 47 MMHG | SYSTOLIC BLOOD PRESSURE: 107 MMHG

## 2018-09-22 VITALS — SYSTOLIC BLOOD PRESSURE: 113 MMHG | DIASTOLIC BLOOD PRESSURE: 53 MMHG

## 2018-09-22 LAB
%HYPO/RBC NFR BLD AUTO: (no result) %
ABSOLUTE BASOPHILS: 0.1 THOU/UL (ref 0–0.2)
ABSOLUTE EOSINOPHILS: 0.4 THOU/UL (ref 0–0.7)
ABSOLUTE MONOCYTES: 0.6 THOU/UL (ref 0–1.2)
ANION GAP SERPL CALC-SCNC: 4 MMOL/L (ref 7–16)
ANISOCYTOSIS BLD QL SMEAR: (no result)
BASOPHILS NFR BLD AUTO: 0.9 %
BUN SERPL-MCNC: 35 MG/DL (ref 7–18)
CALCIUM SERPL-MCNC: 7.9 MG/DL (ref 8.5–10.1)
CHLORIDE SERPL-SCNC: 102 MMOL/L (ref 98–107)
CO2 SERPL-SCNC: 32 MMOL/L (ref 21–32)
CREAT SERPL-MCNC: 3.4 MG/DL (ref 0.6–1.3)
EOSINOPHIL NFR BLD: 7.1 %
GLUCOSE SERPL-MCNC: 74 MG/DL (ref 70–99)
GRANULOCYTES NFR BLD MANUAL: 70.1 %
HCT VFR BLD CALC: 28.5 % (ref 42–52)
HGB BLD-MCNC: 9.2 GM/DL (ref 14–18)
INR PPP: 3.4
LYMPHOCYTES # BLD: 0.7 THOU/UL (ref 0.8–5.3)
LYMPHOCYTES NFR BLD AUTO: 11.5 %
MACROCYTES: (no result)
MCH RBC QN AUTO: 30.7 PG (ref 26–34)
MCHC RBC AUTO-ENTMCNC: 32.3 G/DL (ref 28–37)
MCV RBC: 95 FL (ref 80–100)
MONOCYTES NFR BLD: 10.4 %
MPV: 7.4 FL. (ref 7.2–11.1)
NEUTROPHILS # BLD: 4.2 THOU/UL (ref 1.6–8.1)
NUCLEATED RBCS: 0 /100WBC
PLATELET # BLD EST: ADEQUATE 10*3/UL
PLATELET COUNT*: 157 THOU/UL (ref 150–400)
POLYCHROMASIA BLD QL SMEAR: (no result)
POTASSIUM SERPL-SCNC: 3.9 MMOL/L (ref 3.5–5.1)
PROTHROMBIN TIME: 34.7 SECONDS (ref 9.2–11.5)
RBC # BLD AUTO: 3 MIL/UL (ref 4.5–6)
RDW-CV: 22.2 % (ref 10.5–14.5)
SODIUM SERPL-SCNC: 138 MMOL/L (ref 136–145)
WBC # BLD AUTO: 6 THOU/UL (ref 4–11)

## 2018-09-22 PROCEDURE — 5A1D70Z PERFORMANCE OF URINARY FILTRATION, INTERMITTENT, LESS THAN 6 HOURS PER DAY: ICD-10-PCS | Performed by: PHYSICAL MEDICINE & REHABILITATION

## 2018-09-23 VITALS — SYSTOLIC BLOOD PRESSURE: 92 MMHG | DIASTOLIC BLOOD PRESSURE: 45 MMHG

## 2018-09-23 VITALS — DIASTOLIC BLOOD PRESSURE: 47 MMHG | SYSTOLIC BLOOD PRESSURE: 122 MMHG

## 2018-09-24 VITALS — DIASTOLIC BLOOD PRESSURE: 52 MMHG | SYSTOLIC BLOOD PRESSURE: 118 MMHG

## 2018-09-24 VITALS — DIASTOLIC BLOOD PRESSURE: 47 MMHG | SYSTOLIC BLOOD PRESSURE: 122 MMHG

## 2018-09-24 LAB
INR PPP: 2.3
PROTHROMBIN TIME: 23.5 SECONDS (ref 9.2–11.5)

## 2018-10-05 NOTE — PATH
[address]  [address]                    PATHOLOGY RPT PROCEDURE       
_______________________________________________________________________________
 
Name:       YAKOV RODRIGUEZ             Room:           [address] [place]  
.R.#:  [id number]      Account #:      [id number]  
Admission:  07/23/18     Date of Birth:  [birth year]  
Discharge:  08/13/18                   Report #:    [id number]
                                                         Path Case #: [id number]
_______________________________________________________________________________
 
[facility] Accession Number: [id number]
.                                                                01
Material submitted:                                        .
LEFT RENAL BIOPSY
.                                                                01
Clinical history:                                          .
Chronic renal failure
.                                                                02
**********************************************************************
Diagnosis:
Special studies report received from [facility], [address] [place] [address], [place] [address], on case
[id number], labeled with their number [id number], dated 08/04/2018.
.
Specimen submitted:
By Caroline Brown MD
For Kidney, transplant biopsy
.
DIAGNOSIS:
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
(Pauci-Immune Type).  See Comment.
.
Global Glomerulosclerosis (1/4).
.
Interstitial Fibrosis and Tubular Atrophy, Mild.
.
Comment:  The small cortical sample contains four non-obsolescent
glomeruli for light microscopic evaluation, all of which show cellular
crescent formation.  The biopsy findings are concerning for an
infection-associated glomerulonephritis, which includes deep seated occult
infections (e.g. infective endocarditis, retroperitoneal abscess,
osteomyelitis).  If an infection is excluded, the differential diagnosis
would also include ANCA-relative disease (up to 20% of ANCA-related renal
disease may have negative ANCA testing) and autoimmune disease.  Clinical
correlation is required.
.
Clinical History:
The patient is an 85-year-old man with renal failure.  He has sepsis and
endocehalopathy.  He has an artificial heart valve.
.
Gross Description:
Received from [facility] ([place], [place]) are two specimen bottles, one
bottle contains formalin and the other contains Cruzito's fixative.  The
bottles are labeled with the patient's name (Yakov Rodriguez).
.
Received in formalin are four pieces of tan tissue measuring 0.9 x 0.1 x
0.1 cm (fatty end), 0.9 x 0.1 0.1 cm (fatty end, took one end), 1.6 x 0.1
x 0.1 cm (took one end), and 0.5 x 0.1 x 0.1 cm.  Two ends are submitted
 
[place] [address]                    PATHOLOGY RPT PROCEDURE       
_______________________________________________________________________________
 
Name:       JENNIFER,YAKOV F             Room:           [address] [place]  
M.R.#:  [id number]      Account #:      [id number]  
Admission:  07/23/18     Date of Birth:  [birth year]  
Discharge:  08/13/18                   Report #:    [id number]
                                                         Path Case #: [id number]
_______________________________________________________________________________
for electron microscopy and the remainder of the tissue is submitted in
its entirety for light microscopy.
.
Received in Cruzito's fixative are three pieces of tan tissue measuring 0.3
x 0.1 x 0.1 cm and two at 0.4 x 0.1 x 0.1 cm.  The specimen is submitted
in its entirety for immunofluorescence microscopy.
.
Microscopic Description:
LIGHT MICROSCOPY:
.
Four cores of renal tissue are present for evaluation (35% cortex and 65%
medulla).  Up to five glomeruli are identified, one of which is globally
sclerotic.  The remaining four glomeruli show cellular crescent formation
with segmental foci of necrosis.  Shelton staining confirms the presence of
focal capillary loop rupture.  Approximately two glomeruli show global
mesangial and endocapillary hypercellularity, including prominent
endocapillary neutrophils.  Tubules show mild atrophy.  Focal red blood
cell casts are noted.  No atypical protein casts are identified.  The
interstitium contains no significant inflammation.  Trichrome staining
shows mild intestinal fibrosis (approximately 10-15%).  Vessels show no
arteriosclerosis.  Toluidine blue-stained sections contain no glomeruli
(deeper levels examined).
.
Standard of care requirements for proper analysis of renal biopsies
mandates serial sections, and PAS, Shelton silver, trichrome and SMMT stains
at multiple levels.  PAS stains are used to evaluate various aspects of
the glomerular, tubular, and vascular basement membranes.  Shelton silver
stains are used to evaluate thickening, reduplication, "spiking" or
"bubbling" of the glomerular basement membrane.  Toluidine blue stained
sections highlight glomerular basement membranes and demonstrates unusual
types of deposits.  It also reveals details of tubular epithelial cells
and aids in the analysis of vascular lesions.  Kiesha trichrome stains are
used to evaluate interstitial fibrosis and basement membrane deposits.
The SMMT stain helps evaluate basement membrane changes, immune deposits
and tubulointerstitial scarring.  Controls are routinely run on all
special stains and are verified for acceptability.  A review of the
technical quality of routine slides is made before results are reported.
.
IMMUNOFLUORESCENCE:
Three cores of renal tissue are present for evaluation (100% medulla).  No
glomeruli are identified.  The sections are stained for IgG, IgA, IgM, C3,
C1q, albumin, fibrinogen, kappa and lambda light chains.  Staining of the
tubulointerstitium by kappa and lambda appears equal.  All other stains
show no significant extraglomerular staining.  Given the absence of
glomeruli, the paraffin embedded tissue is processed for
immunofluorescence and is stained for IgA, IgG, IgM, C3, kappa, and
lambda.  There is granular mesangial and capillary wall staining for IgA
(1+), IgG (traces, and lambda (trace).   All other stains are negative in
glomeruli.  Again, staining of the tubulointerstitium by kappa and lambda
 
[place] [address]                    PATHOLOGY RPT PROCEDURE       
_______________________________________________________________________________
 
Name:       YAKOV RODRIGUEZ             Room:           [address] [place]  
.R.#:  [id number]      Account #:      [id number]  
Admission:  07/23/18     Date of Birth:  [birth year]  
Discharge:  08/13/18                   Report #:    [id number]
                                                         Path Case #: [id number]
_______________________________________________________________________________
appears equal.
.
Positive and negative controls are run on all immunofluorescent stains and
are verified for acceptability before results are reported.  Internal
antigens serve as positive controls.
.
ELECTRON MICROSCOPY:
Two blocks are prepared.  Ultrastructural examination is not performed
given the lack of glomeruli.
.
Special procedures including immunofluorescence and electron microscopy
correlate with the light microscopy findings.
.
Note:  Some of the tests reported here may have been developed and
performance characteristics determined by [facility].  They have
not been cleared or approved by the U.S. Food and Drug Administration
(FDA).  The FDA does not require this test to go through premarket FDA
review.  This test is used for clinical purposes.  It should not be
regarded as investigational or for research. [facility] is
certified under the Clinical Laboratory Improvement Amendments of 1988
(CLIA) as qualified to perform high complexity clinical laboratory
testing.
.
Physician/Physician's office called on 08/04/2018 at 1:25 PM [facility].
.
*I have reviewed the clinical history, the pertinent gross findings, all
microscopic materials, discussed the case with the clinician when
appropriate, and have rendered the final diagnosis.
.
Final Diagnosis performed by
ERNESTINA Quesada M.D.
Electronically signed 08/06/2018 4:43:23 PM
.
A complete copy of the report is on file.
.
Professional and technical services performed by [facility] at
[address] [place] [address], [address], [facility].
.
([facility] 08/07/2018)
[facility]/08/09/2018
**********************************************************************
.                                                                02
Electronically signed:                                     .
Juice Soria MD, Pathologist
NPI- [id number]
.                                                                01
Gross description:                                         .
The specimen is received in formalin, labeled "Yakov Rodriguez, renal
biopsy left".  Received are multiple needle cores of pale tan soft tissue
 
[place] [address]                    PATHOLOGY RPT PROCEDURE       
_______________________________________________________________________________
 
Name:       YAKOV RODRIGUEZ             Room:           [address] [place]  
M.R.#:  [id number]      Account #:      [id number]  
Admission:  07/23/18     Date of Birth:  [birth year]  
Discharge:  08/13/18                   Report #:    [id number]
                                                         Path Case #: [id number]
_______________________________________________________________________________
ranging in length from 0.5 to 1.3 cm, with each measuring 0.1 cm in
diameter.  The specimen is forwarded to an outside laboratory for further
processing.
.
Also received is a container of Cruzito's fixative, labeled "Yakov Rodriguez, renal biopsy left".  Received are multiple needle cores of pale
tan soft tissue ranging in length from 0.3 to 0.6 cm, with each measuring
0.1 cm in diameter.  The specimen is forwarded to an outside laboratory
for further processing.
([facility]; 8/3/2018)
QAC/QAC
.                                                                02
CPT                                                        .
358894
Specimen Comment: A courtesy copy of this report has been sent to
Specimen Comment: [phone], [phone].
***Performed at:  01
   [facility] [address], [place], [place]  [id number]
   MD John Yates MD Phone:  [phone]
***Performed at:  02
   [facility]
   [facility] [place], [place], [place]  [id number]
   MD Juice Soria MD Phone:  [phone]

## 2018-10-15 NOTE — D
[facility] 
[address] [place]  [address]                    DISCHARGE SUMMARY             
_______________________________________________________________________________
 
Name:       YAKOV RODRIGUEZ             Room:           [address] [place]  
[facility].#:  [id number]      Account #:      [id number]  
Admission:  09/05/18     Attend Phys:    Silvana Chen DO   
Discharge:  09/24/18     Date of Birth:  [birth year]  
         Report #: [id number]
                                                                     [id number]  
_______________________________________________________________________________
THIS REPORT FOR:  //name//                      
 
CC: Silvana Landin
 
DATE OF SERVICE:  09/24/2018
 
 
DISCHARGE DIAGNOSES:  Status post above-knee amputation with recent diagnosis of
acute renal failure, on hemodialysis.
 
DISCHARGE DISPOSITION:  To skilled level of care for continued subacute
rehabilitation.
 
The patient did progress in therapies; however, did plateau, but needed
continued therapies, but unable to discharge to the home setting due to his
ongoing debility as well as higher level of care, not appropriate for family
members to care for.  He was maintained on low endurance protocol given dialysis
and prolonged hospitalization and debility.  He was also nonweightbearing on the
right lower extremity.
 
PLAN:
1.  He will follow with vascular surgery within 1-2 weeks.
2.  He will follow with primary care physician 1 week after discharge from
subacute level of care.
3.  Medications were reviewed and reconciled by myself and are available in the
MAR.
 
We will maintain the same diet.
 
PHYSICAL EXAMINATION
GENERAL:  Alert, oriented, in no apparent distress.
VITAL SIGNS:  Reviewed and are stable.
HEENT:  Atraumatic, normocephalic.  Pupils equal, round, reactive.
ABDOMEN:  Soft, nontender, nondistended.
NEUROLOGIC:  Cranial nerves 2-12 are grossly intact with no focal neuro
deficits, 5/5 strength in bilateral upper and lower extremities.
SKIN:  Warm and dry.  No rashes or lesions noted.  Right above-knee amputation
is noted.  Wound is healing well.
 
 
 
 
 
<ELECTRONICALLY SIGNED>
                                        By:  Silvana Chen DO            
10/15/18     1019
D: 10/02/18 1622_______________________________________
T: 10/02/18 1947Silvana Chen DO               /nt

## 2018-11-18 ENCOUNTER — HOSPITAL ENCOUNTER (INPATIENT)
Dept: HOSPITAL 96 - M.ERS | Age: 83
LOS: 4 days | Discharge: NURSING, REHAB OR LONG-TERM CARE | DRG: 602 | End: 2018-11-22
Attending: INTERNAL MEDICINE | Admitting: INTERNAL MEDICINE
Payer: MEDICARE

## 2018-11-18 VITALS — DIASTOLIC BLOOD PRESSURE: 68 MMHG | SYSTOLIC BLOOD PRESSURE: 125 MMHG

## 2018-11-18 VITALS — HEIGHT: 72.01 IN | BODY MASS INDEX: 18.56 KG/M2 | WEIGHT: 137 LBS

## 2018-11-18 VITALS — SYSTOLIC BLOOD PRESSURE: 124 MMHG | DIASTOLIC BLOOD PRESSURE: 56 MMHG

## 2018-11-18 DIAGNOSIS — N18.6: ICD-10-CM

## 2018-11-18 DIAGNOSIS — M86.8X6: ICD-10-CM

## 2018-11-18 DIAGNOSIS — I25.2: ICD-10-CM

## 2018-11-18 DIAGNOSIS — D68.59: ICD-10-CM

## 2018-11-18 DIAGNOSIS — K56.41: ICD-10-CM

## 2018-11-18 DIAGNOSIS — E11.69: ICD-10-CM

## 2018-11-18 DIAGNOSIS — I25.10: ICD-10-CM

## 2018-11-18 DIAGNOSIS — Z87.891: ICD-10-CM

## 2018-11-18 DIAGNOSIS — I13.2: ICD-10-CM

## 2018-11-18 DIAGNOSIS — D63.8: ICD-10-CM

## 2018-11-18 DIAGNOSIS — I50.20: ICD-10-CM

## 2018-11-18 DIAGNOSIS — Z89.422: ICD-10-CM

## 2018-11-18 DIAGNOSIS — E11.51: ICD-10-CM

## 2018-11-18 DIAGNOSIS — Z88.0: ICD-10-CM

## 2018-11-18 DIAGNOSIS — Z99.2: ICD-10-CM

## 2018-11-18 DIAGNOSIS — I42.9: ICD-10-CM

## 2018-11-18 DIAGNOSIS — L03.116: Primary | ICD-10-CM

## 2018-11-18 DIAGNOSIS — E11.22: ICD-10-CM

## 2018-11-18 DIAGNOSIS — Z91.040: ICD-10-CM

## 2018-11-18 DIAGNOSIS — E78.5: ICD-10-CM

## 2018-11-18 DIAGNOSIS — E87.6: ICD-10-CM

## 2018-11-18 DIAGNOSIS — E44.0: ICD-10-CM

## 2018-11-18 LAB
ABSOLUTE BASOPHILS: 0.1 THOU/UL (ref 0–0.2)
ABSOLUTE EOSINOPHILS: 0.1 THOU/UL (ref 0–0.7)
ABSOLUTE MONOCYTES: 0.5 THOU/UL (ref 0–1.2)
ALBUMIN SERPL-MCNC: 2.2 G/DL (ref 3.4–5)
ALP SERPL-CCNC: 72 U/L (ref 46–116)
ALT SERPL-CCNC: 26 U/L (ref 30–65)
ANION GAP SERPL CALC-SCNC: 6 MMOL/L (ref 7–16)
ANISOCYTOSIS BLD QL SMEAR: (no result)
AST SERPL-CCNC: 40 U/L (ref 15–37)
BASOPHILS NFR BLD AUTO: 1.2 %
BILIRUB SERPL-MCNC: 0.5 MG/DL
BILIRUB UR-MCNC: NEGATIVE MG/DL
BUN SERPL-MCNC: 21 MG/DL (ref 7–18)
CALCIUM SERPL-MCNC: 8.2 MG/DL (ref 8.5–10.1)
CHLORIDE SERPL-SCNC: 101 MMOL/L (ref 98–107)
CO2 SERPL-SCNC: 35 MMOL/L (ref 21–32)
COLOR UR: YELLOW
CREAT SERPL-MCNC: 2.2 MG/DL (ref 0.6–1.3)
EOSINOPHIL NFR BLD: 1.2 %
GLUCOSE SERPL-MCNC: 129 MG/DL (ref 70–99)
GRANULOCYTES NFR BLD MANUAL: 70.5 %
HCT VFR BLD CALC: 34 % (ref 42–52)
HGB BLD-MCNC: 11 GM/DL (ref 14–18)
KETONES UR STRIP-MCNC: NEGATIVE MG/DL
LYMPHOCYTES # BLD: 0.9 THOU/UL (ref 0.8–5.3)
LYMPHOCYTES NFR BLD AUTO: 17.1 %
MAGNESIUM SERPL-MCNC: 1.8 MG/DL (ref 1.8–2.4)
MCH RBC QN AUTO: 30.5 PG (ref 26–34)
MCHC RBC AUTO-ENTMCNC: 32.3 G/DL (ref 28–37)
MCV RBC: 94.5 FL (ref 80–100)
MONOCYTES NFR BLD: 10 %
MPV: 6.9 FL. (ref 7.2–11.1)
MUCUS: (no result) STRN/LPF
NEUTROPHILS # BLD: 3.8 THOU/UL (ref 1.6–8.1)
NUCLEATED RBCS: 0 /100WBC
PLATELET COUNT*: 140 THOU/UL (ref 150–400)
POTASSIUM SERPL-SCNC: 3.5 MMOL/L (ref 3.5–5.1)
PROT SERPL-MCNC: 5.6 G/DL (ref 6.4–8.2)
PROT UR QL STRIP: (no result)
RBC # BLD AUTO: 3.6 MIL/UL (ref 4.5–6)
RBC # UR STRIP: (no result) /UL
RBC #/AREA URNS HPF: (no result) /HPF (ref 0–2)
RDW-CV: 21.7 % (ref 10.5–14.5)
SODIUM SERPL-SCNC: 142 MMOL/L (ref 136–145)
SP GR UR STRIP: 1.01 (ref 1–1.03)
SQUAMOUS: (no result) /LPF (ref 0–3)
URINE CLARITY: CLEAR
URINE GLUCOSE-RANDOM: NEGATIVE
URINE LEUKOCYTES-REFLEX: (no result)
URINE NITRITE-REFLEX: NEGATIVE
URINE WBC-REFLEX: (no result) /HPF (ref 0–5)
UROBILINOGEN UR STRIP-ACNC: 0.2 E.U./DL (ref 0.2–1)
WBC # BLD AUTO: 5.5 THOU/UL (ref 4–11)

## 2018-11-18 NOTE — NUR
REPORT GIVEN TO FLOOR NURSE AT THIS TIME. CEFTRIAXONE TAKEN UP WITH PATIENT FOR
FLOOR NURSE TO GIVEN

## 2018-11-19 VITALS — SYSTOLIC BLOOD PRESSURE: 119 MMHG | DIASTOLIC BLOOD PRESSURE: 70 MMHG

## 2018-11-19 VITALS — DIASTOLIC BLOOD PRESSURE: 52 MMHG | SYSTOLIC BLOOD PRESSURE: 96 MMHG

## 2018-11-19 VITALS — DIASTOLIC BLOOD PRESSURE: 59 MMHG | SYSTOLIC BLOOD PRESSURE: 103 MMHG

## 2018-11-19 LAB
ABSOLUTE BASOPHILS: 0 THOU/UL (ref 0–0.2)
ABSOLUTE EOSINOPHILS: 0.2 THOU/UL (ref 0–0.7)
ABSOLUTE MONOCYTES: 0.5 THOU/UL (ref 0–1.2)
ANION GAP SERPL CALC-SCNC: 5 MMOL/L (ref 7–16)
ANISOCYTOSIS BLD QL SMEAR: (no result)
BASOPHILS NFR BLD AUTO: 0.9 %
BUN SERPL-MCNC: 24 MG/DL (ref 7–18)
CALCIUM SERPL-MCNC: 7.9 MG/DL (ref 8.5–10.1)
CHLORIDE SERPL-SCNC: 105 MMOL/L (ref 98–107)
CO2 SERPL-SCNC: 31 MMOL/L (ref 21–32)
CREAT SERPL-MCNC: 2.5 MG/DL (ref 0.6–1.3)
EOSINOPHIL NFR BLD: 3.7 %
ESR (SEDRATE): 22 MM/HR (ref 0–20)
GLUCOSE SERPL-MCNC: 83 MG/DL (ref 70–99)
GRANULOCYTES NFR BLD MANUAL: 67.2 %
HCT VFR BLD CALC: 31.9 % (ref 42–52)
HGB BLD-MCNC: 10.2 GM/DL (ref 14–18)
INR PPP: 1.7
LYMPHOCYTES # BLD: 0.8 THOU/UL (ref 0.8–5.3)
LYMPHOCYTES NFR BLD AUTO: 17 %
MAGNESIUM SERPL-MCNC: 1.8 MG/DL (ref 1.8–2.4)
MCH RBC QN AUTO: 30.6 PG (ref 26–34)
MCHC RBC AUTO-ENTMCNC: 31.9 G/DL (ref 28–37)
MCV RBC: 95.9 FL (ref 80–100)
MONOCYTES NFR BLD: 11.2 %
MPV: 7.2 FL. (ref 7.2–11.1)
NEUTROPHILS # BLD: 3.1 THOU/UL (ref 1.6–8.1)
NUCLEATED RBCS: 0 /100WBC
PLATELET # BLD EST: ADEQUATE 10*3/UL
PLATELET COUNT*: 116 THOU/UL (ref 150–400)
POTASSIUM SERPL-SCNC: 3.2 MMOL/L (ref 3.5–5.1)
PROTHROMBIN TIME: 17.4 SECONDS (ref 9.2–11.5)
RBC # BLD AUTO: 3.32 MIL/UL (ref 4.5–6)
RDW-CV: 21.6 % (ref 10.5–14.5)
SODIUM SERPL-SCNC: 141 MMOL/L (ref 136–145)
WBC # BLD AUTO: 4.6 THOU/UL (ref 4–11)

## 2018-11-19 NOTE — NUR
PATIENT HAS BEEN A/O X 4, SLIGHTLY FORGETFUL AT TIMES. PATIENT HAS DENIED
PAIN. SEEN BY VASCULAR, NEPHROLOGY, AND WOUND CARE THIS SHIFT. DRESSING
APPLIED TO LEFT LOWER LEG. PATIENT HAD ULTRASOUND OF LEFT LOWER LEG. PATIENT
UP WITH [facility]IST THIS SHIFT TO [facility]. PATIENT TO HAVE DIALYSIS ON TUESDAY,
WIFE AND PATIENT AWARE OF PLANS. PATIENT REPOSITIONED WHEN PATIENT ALLOWED.
PATIENT EDUCATED ON IMPORTANCE OF ELEVATING HEEL OFF BED, REFUSED HEELMEDIX
BOOT BUT DID ALLOW FOR NURSE TO ELEVATE HEEL ON PILLOW. MRSA SWAB PENDING.
HOURLY ROUNDING COMPLETED. FALL PRECAUTIONS IN PLACE. CALL LIGHT WITHIN REACH.
WILL CONTINUE WITH PLAN OF CARE.

## 2018-11-19 NOTE — NUR
WOUND CARE NOTE:  CONSULT RECEIVED FOR LEG AND TOE WOUNDS.
 
PATIENT WELL KNOWN TO ME FROM PREVIOUS HOSPITAL STAY.  PATIENT HAS A FULL
THICKNESS TRAUMATIC WOUND TO LATERAL LEFT LEG.  WOUND MEASURES 5X2X0.3.
MOIST, YELLOW WOUND BED DRAINING SEROSANGUINEOUS DRAINAGE.  ANNETTE-WOUND INTACT.
 CLEANSED WITH WOUND CLEANSER, PATTED DRY.  APPLIED AQUACEL AG AND COVERED
WITH ABD.
 
MULTIPLE SCABS JUST DISTAL TO LEFT KNEE.  SOME ARE OPENED. AFTER CLEANSING,
APPLIED VASELINE GAUZE.
 
LEFT HALLUX WITH DRIED YELLOW ESCHAR MEASURING 1X2.5.  NO S/S OF INFECTION.
PAINTED WITH BETADINE.
 
LEFT HEEL IS BOGGY AND RED WITH INTACT SCABS.  EDUCATED PATIENT AND SPOUSE ON
IMPORTANCE OF KEEPING HEEL OFF BED.  HIGHLY RECOMMENDED OFFLOADING BOOTS BUT
PATIENT ADEMENTLY REFUSES.  FURTHER EDUCATED THAT IF HIS HEEL IS NOT OFFLOADED
THAT HE HAS THE POTENTIAL OF DEVELOPING AN ULCERATION.  PATIENT CONTINUES TO
REFUSE OFFLOADING BOOTS.  HOPE PATIENT WILL ALLOW FOR OFFLOADING WITH PILLOWS,
BUT PATIENT WAS TAKEN TO ULTRASOUND.  SPOKE WITH RN AND UPDATED ON PLAN OF
CARE.
 
RECOMMEND
OFFLOAD HEEL WITH PILLOW
TIGHT BLOOD GLUCOSE CONTROL
DAILY DRESSING CHANGES
FOLLOW UP IN WOUND CENTER UPON DISCHARGE

## 2018-11-19 NOTE — NUR
pt arrived 11/18/18 at 2038 admitted to room 310, pt placed in bed
and introduced to surroundings by pct, vital signs obtained, this nurse speaks
to pts wife and daughter in law in the hallway as they are leaving for the
night, information to complete admission received from pts wife, this nurse to
see pt, pt is alert, hard of hearing, pt is reluctant and hesitant to answer
questions, he appears to hear this nurse speaking but will not answer some
questions, pt is orineted x3  he says he is here "because they made me come,
they thought I needed to be here" he appears agitated, pt denies pain,
lung sounds clear, wounds to LRE covered with xeroform, wounds documented and
redressed, pt receives iv abx with no adverse effects noted, pt remains
afebrile, resting quietly, bed in lowest position with call light within
reach, bed alarm on

## 2018-11-19 NOTE — NUR
SW met with pt to complete initial assessment, introduce self, and SW role.
Pt family had just left from visiting pt.  Pt explained that he would really
want to be able to dc home with wife but aware that he may still need more
care than feasible at home for his wife.  Plan for pt to be able to dc to [facility];
SW to continue to follow to assist with safe dc planning.  Pt dialysis with
[facility].

## 2018-11-20 VITALS — SYSTOLIC BLOOD PRESSURE: 107 MMHG | DIASTOLIC BLOOD PRESSURE: 54 MMHG

## 2018-11-20 VITALS — SYSTOLIC BLOOD PRESSURE: 103 MMHG | DIASTOLIC BLOOD PRESSURE: 48 MMHG

## 2018-11-20 VITALS — SYSTOLIC BLOOD PRESSURE: 121 MMHG | DIASTOLIC BLOOD PRESSURE: 71 MMHG

## 2018-11-20 LAB
ANION GAP SERPL CALC-SCNC: 8 MMOL/L (ref 7–16)
BUN SERPL-MCNC: 28 MG/DL (ref 7–18)
CALCIUM SERPL-MCNC: 7.7 MG/DL (ref 8.5–10.1)
CHLORIDE SERPL-SCNC: 104 MMOL/L (ref 98–107)
CO2 SERPL-SCNC: 29 MMOL/L (ref 21–32)
CREAT SERPL-MCNC: 2.8 MG/DL (ref 0.6–1.3)
GLUCOSE SERPL-MCNC: 71 MG/DL (ref 70–99)
HCT VFR BLD CALC: 34.4 % (ref 42–52)
HGB BLD-MCNC: 11.1 GM/DL (ref 14–18)
INR PPP: 1.5
MAGNESIUM SERPL-MCNC: 1.9 MG/DL (ref 1.8–2.4)
MCH RBC QN AUTO: 30.9 PG (ref 26–34)
MCHC RBC AUTO-ENTMCNC: 32.3 G/DL (ref 28–37)
MCV RBC: 95.5 FL (ref 80–100)
MPV: 7 FL. (ref 7.2–11.1)
PLATELET COUNT*: 128 THOU/UL (ref 150–400)
POTASSIUM SERPL-SCNC: 3.4 MMOL/L (ref 3.5–5.1)
PROTHROMBIN TIME: 15.1 SECONDS (ref 9.2–11.5)
RBC # BLD AUTO: 3.61 MIL/UL (ref 4.5–6)
RDW-CV: 22.4 % (ref 10.5–14.5)
SODIUM SERPL-SCNC: 141 MMOL/L (ref 136–145)
WBC # BLD AUTO: 5.1 THOU/UL (ref 4–11)

## 2018-11-20 PROCEDURE — 5A1D70Z PERFORMANCE OF URINARY FILTRATION, INTERMITTENT, LESS THAN 6 HOURS PER DAY: ICD-10-PCS | Performed by: INTERNAL MEDICINE

## 2018-11-20 NOTE — NUR
PT SLEPT MOST OF SHIFT. ASSESSMENT AS DOCUMENTED. MEDS GIVEN PER E-MAR. IV
PATENT. PT REFUSED SEVERAL Q2T. NO REPORTS OF PAIN OR NAUSEA THIS SHIFT.
DRESSING REMAIN C/D/I. WILL CONTINUE WITH PLAN OF CARE.

## 2018-11-20 NOTE — NUR
PATIENT HAS BEEN A/O, FORGETFUL AT TIMES. HAS DENIED PAIN. DRESSINGS TO LEFT
LEG CHANGED AND TOES PAINTED WITH BETADINE. PATIENT HAD DIALYSIS THIS SHIFT,
PLAN IS TO HAVE DIALYSIS ON WEDNESDAY DUE TO HOLIDAY ON THURSDAY. PATIENT HAD
CTA RUNOFF OF LEFT LEG TDOAY. PATIENT CONTINUES ON IV ANTIBIOTICS. PATIENT
TURNED EVERY 2 HOURS WHEN ALLOWED BY PATIENT. LEFT HEEL ELEVATED ON PILLOW
WHILE IN BED. BLOOD SUGARS MONITORED. PATIENT'S WIFE AT BEDSIDE THIS SHIFT. IN
CONTACT ISOLATION FOR MRSA. HOURLY ROUNDING COMPLETED. CALL LIGHT WITHIN
REACH. WILL CONTINUE WITH PLAN OF CARE.

## 2018-11-21 VITALS — SYSTOLIC BLOOD PRESSURE: 140 MMHG | DIASTOLIC BLOOD PRESSURE: 71 MMHG

## 2018-11-21 VITALS — DIASTOLIC BLOOD PRESSURE: 58 MMHG | SYSTOLIC BLOOD PRESSURE: 123 MMHG

## 2018-11-21 LAB
ANION GAP SERPL CALC-SCNC: 2 MMOL/L (ref 7–16)
BUN SERPL-MCNC: 16 MG/DL (ref 7–18)
CALCIUM SERPL-MCNC: 7.6 MG/DL (ref 8.5–10.1)
CHLORIDE SERPL-SCNC: 103 MMOL/L (ref 98–107)
CO2 SERPL-SCNC: 32 MMOL/L (ref 21–32)
CREAT SERPL-MCNC: 1.9 MG/DL (ref 0.6–1.3)
GLUCOSE SERPL-MCNC: 74 MG/DL (ref 70–99)
HCT VFR BLD CALC: 29.8 % (ref 42–52)
HGB BLD-MCNC: 9.9 GM/DL (ref 14–18)
INR PPP: 1.6
MAGNESIUM SERPL-MCNC: 1.7 MG/DL (ref 1.8–2.4)
MCH RBC QN AUTO: 31.5 PG (ref 26–34)
MCHC RBC AUTO-ENTMCNC: 33.2 G/DL (ref 28–37)
MCV RBC: 94.8 FL (ref 80–100)
MPV: 7.1 FL. (ref 7.2–11.1)
PLATELET COUNT*: 119 THOU/UL (ref 150–400)
POTASSIUM SERPL-SCNC: 3.4 MMOL/L (ref 3.5–5.1)
PROTHROMBIN TIME: 16.5 SECONDS (ref 9.2–11.5)
RBC # BLD AUTO: 3.14 MIL/UL (ref 4.5–6)
RDW-CV: 22.1 % (ref 10.5–14.5)
SODIUM SERPL-SCNC: 137 MMOL/L (ref 136–145)
WBC # BLD AUTO: 5.5 THOU/UL (ref 4–11)

## 2018-11-21 PROCEDURE — 5A1D70Z PERFORMANCE OF URINARY FILTRATION, INTERMITTENT, LESS THAN 6 HOURS PER DAY: ICD-10-PCS | Performed by: INTERNAL MEDICINE

## 2018-11-21 NOTE — NUR
PT SLEPT MOST OF SHIFT. ASSESSMENT AS DOCUMENTED. MEDS GIVEN PER E-MAR. IV
PATENT. NO REPORTS OF PAIN OR NAUSEA. DRESSING ON LEG C/D/I. WILL CONTINUE
WITH PLAN OF CARE.

## 2018-11-21 NOTE — NUR
Nutrition: Consult for wt loss. Per [employer], wt has been stable since Sept
2018, 136#. H/o DM, Rt AKA, HD. Alb 2.2, prealb 14.2. Will get dialysis today.
Pt was not in room at time of visit; 10:30. RX: B12, SSI, vanc. CHO controlled
diet. RD ordered Ensure MAX for added protein. Mild risk.

## 2018-11-21 NOTE — NUR
PATIENT CONTINUES TO REFUSE THE TAP WATER ENEMA. DID ALLOW NURSE TO ATTEMPT
TO DIGITALLY DISIMPACT PATIENT. PATIENT HAD SMALL BM ON BED PAD PRIOR TO
ATTEMPT OF DISIMPACTION. PATIENT ALLOWED RN TO ATTEMPT TO DISIMPACT, THEN
ASKED RN TO STOP. ANNETTE-CARE PROVIDED AND PATIENT REFUSED TO TURN AT THIS TIME.
LEFT LEG ELEVATED. REPORT GIVEN TO DONALDO NOLAN RN WHO WILL BE ASSUMING CARE OF
PATIENT.

## 2018-11-21 NOTE — NUR
MARIO was informed of pt to dc today; pt now is LTC at [facility].  MARIO called and spoke
with Candace at [facility] who explained that they are unable to accept back today but
would be able to accept pt tomorrow and scheduled transportation between 10:00
to 10:30 am. Transport is with [employer] so this could be on time or it
could be a little later.  MARIO faxed referral info as well as dc orders/med list
to [facility] ph [phone] fax [phone].  MARIO called pt son and informed of dc plan by
message; Murphy returned call and left MARIO message that he was in agreement
with plan.

## 2018-11-21 NOTE — NUR
PATIENT RETURNED FROM DIALYSIS. PATIENT'S BLOOD SUGAR NOTED TO BE 54, JUICE
GIVEN AND RECHECKED BLOOD SUGAR NOW 80. PATIENT IN BAD MOOD AFTER RETURNING
FROM DIALYSIS. PATIENT UPSET HE IS STILL AT HOSPITAL, REASSURANCE PROVIDED.
PATIENT EXPLAINED THAT ORDERS NOTED FOR ENEMA, PATIENT REFUSED AT THIS TIME.
PATIENT'S LEFT LOWER LEG WOUND CHANGED, LEFT HEEL ELEVATED. WIFE AT BEDSIDE.

## 2018-11-21 NOTE — CON
[address]  [address]                    CONSULTATION                  
_______________________________________________________________________________
 
Name:       JENNIFERYAKOV F             Room:           [address] [place]  
.R.#:  [id number]      Account #:      [id number]  
Admission:  11/18/18     Attend Phys:    Doyle Bird MD 
Discharge:               Date of Birth:  [birth year]  
         Report #: [id number]
                                                                     [id number]  
_______________________________________________________________________________
THIS REPORT FOR:  //name//                      
 
CC: Doyle Landin
 
DATE OF SERVICE:  11/20/2018
 
 
ATTENDING PHYSICIAN:  Dr. Doyle Bird,
 
REASON FOR EVALUATION:  Left lower extremity skin and soft tissue infection with
leg wounds, likely secondarily infected as well.  Positive surveillance for
MRSA.
 
HISTORY OF PRESENT ILLNESS:  Chart reviewed, patient examined.  This is an
85-year-old well known to myself, has diabetes mellitus, complicated by severe
vasculopathy, has had previous ongoing issues of peripheral disease, has had a
right lower extremity amputation, who is at a facility, is undergoing a thrice
weekly hemodialysis as well.  He developed ulceration in the lateral aspect of
the mid portion of his left leg roughly 3-4 weeks ago, had been undergoing wound
care, however, noted increasing inflammatory changes noted distally.  There was
a component of rubor, there is question of perhaps ischemic component as well. 
He was admitted and underwent imaging studies, which did show perfusion distally
in the setting of previous occlusion with bypass.  He was treated empirically
with antimicrobials including ceftriaxone, vancomycin.  The  appearance of the
leg has improved in terms of the redness as well as the swelling.  Denies
significant pain.
 
ALLERGIES:  INCLUDE LATEX, ACE INHIBITORS, PENICILLINS.
 
MEDICATIONS:  Include cyanocobalamin, warfarin, vancomycin, mirtazapine,
atorvastatin, tamsulosin, ceftriaxone, insulin, promethazine as needed,
losartan, ascorbic acid, ferrous sulfate, fish oil, metoprolol, sevelamer.
 
PAST MEDICAL HISTORY:  Includes diabetes mellitus type 2, complicated by diffuse
vasculopathy.  Known coronary artery disease, peripheral vascular disease. 
Chronic renal failure, on dialysis.  History of atrial fibrillation, pacemaker,
aortic valve replacement, history of cardiomyopathy, congestive heart failure,
previous right lower extremity amputation.
 
SOCIAL HISTORY:  Former smoker.  No ethanol.
 
FAMILY HISTORY:  Noncontributory.
 
REVIEW OF SYSTEMS:  Otherwise unremarkable 10-point review of systems with the
exception of the above.  Denies any recent fevers or chills.  Appetite has been
 
 
 
[place] [address]                    CONSULTATION                  
_______________________________________________________________________________
 
Name:       YAKOV RODRIGUEZ             Room:           [address]#:  [id number]      Account #:      [id number]  
Admission:  11/18/18     Attend Phys:    Doyle Bird MD 
Discharge:               Date of Birth:  [birth year]  
         Report #: [id number]
                                                                     [id number]  
_______________________________________________________________________________
somewhat marginal.  No pulmonary or gastrointestinal related complaints.
 
PHYSICAL EXAMINATION:
GENERAL:  He appears chronically ill, undernourished.  He is pleasant,
cooperative.  I think that he does have a degree of dementia and slow to respond
often to questions, mild distress.
VITAL SIGNS:  Temperature 97.9, pulse 63, respirations 18, blood pressure is
96/52.
HEENT:  NECK:  Supple.  No oral lesions appreciated.  No conjunctivitis.
LUNGS:  Diminished breath sounds.  Few scattered crackles.
HEART:  Irregular.  I do not appreciate murmur.
ABDOMEN:  Soft, nondistended, amputation on the right.
EXTREMITIES:  Left lower extremity has dry skin.  It is somewhat pale.  There
are superficial ulcers laterally. A mild degree of inflammatory eruption noted.
GENITOURINARY:  Deferred.
RECTAL:  Deferred.
 
LABORATORY DATA:  Blood cultures sterile thus far.  CBC:  White count of 5.1, H
and H 11.1 and 34.4, platelets of 128.  Electrolytes:  Sodium 141, potassium
3.4, chloride 104, bicarbonate is 29, anion gap of 8.  BUN and creatinine 28 and
2.8.  Free T4 of 2.0.  MRSA surveillance was positive.  Arterial ultrasound,
occlusion of the left SFA and popliteal artery collateral branches, monophasic
flow to the left ankle, both anterior and posterior tibial arteries.
 
ASSESSMENT:  Left lower extremity inflammatory eruption.  I think it is likely
multifactorial.  There is a component of infection.  He seems to have improved
with initiation of the approach including antibiotics.  We will continue with
the vancomycin thrice weekly with dialysis for the next couple of weeks and can
discontinue the ceftriaxone.  Vascular evaluation in progress.  He was
encouraged to optimize his nutritional status, will go a long way into assisting
his recovery.
 
 
 
 
 
 
 
 
 
 
 
 
 
<ELECTRONICALLY SIGNED>
                                        By:  Inder Garcia MD           
11/21/18     0549
D: 11/20/18 1110_______________________________________
T: 11/20/18 1204Jotriston Garcia MD              /nt

## 2018-11-21 NOTE — NUR
ASSUMED CARE OF PATIENT AT 1630. PATIENT RESTING IN BED. PATIENT DENIES ANY
PAIN. PATIENT HAD DIALYSIS THIS AM WITHOUT INCIDENT. PATIENT REFUSED DINNER
THIS EVENING, ALTERNATIVE FOODS AND ENSURE OFFERED BUT REFUSED. DISCHAREG HELD
DUE TO NO BED AVAILABILITY. PATIENT DENIES ANY NEEDS AT THIS TIME. CALL LIGHT
WITHIN REACH. WILL CONTINUE TO MONITOR.

## 2018-11-22 VITALS — DIASTOLIC BLOOD PRESSURE: 58 MMHG | SYSTOLIC BLOOD PRESSURE: 123 MMHG

## 2018-11-22 VITALS — SYSTOLIC BLOOD PRESSURE: 131 MMHG | DIASTOLIC BLOOD PRESSURE: 69 MMHG

## 2018-11-22 VITALS — SYSTOLIC BLOOD PRESSURE: 123 MMHG | DIASTOLIC BLOOD PRESSURE: 58 MMHG

## 2018-11-22 NOTE — NUR
PT SLEPT ON AND OFF THIS SHIFT. ASSESSMENT AS DOCUMENTED. MEDS GIVEN PER
E-MAR. IV PATENT. PT STATED DISCOMFORT IN HIS HANDS BUT REFUSED PAIN
MEDICATION. PT REFUSED MOST REPOSTIONS. PT INCONTINENT OF STOOL AND URINE THIS
SHIFT. PT STATES HE WANTS TO LEAVE. AROUND 0200 PT STARTED STATING THAT HE
WANTS TO EAT BREAKFAST, PT REMINDED WHAT TIME BREAKFAST IS AND IS REFUSING ANY
SNACKS THAT ARE OFFERED. WILL CONTINUE WITH PLAN OF CARE.

## 2018-11-22 NOTE — NUR
PATIENT DISCHARGED TO [facility]. TRANSPORTATION ARRIVED 1 HOUR EARLY.
COPY OF CHART AND DISCHARGE PAPERS GIVEN TO TRANSPORTER. PATIENT ASSITED WITH
GETTING DRESSED, WIFE PACKED BELONGINGS. DRESSING CHANGED TO LEFT LEG AND
PICTURES TAKEN. IV REMOVED.  DIALYSIS LINE IN PLACE. REPORT CALLED BUT
RECEIVING NURSE UNAVAILABLE, MESSAGE LEFT FOR RETURN CALL. PATIENT TAKEN BY
WHEELCHAIR VAN AT THIS TIME.

## 2018-11-23 NOTE — CON
[address]  [address]                    CONSULTATION                  
_______________________________________________________________________________
 
Name:       YAKOV RODRIGUEZ             Room:           M.310-P    DIS IN  
M.R.#:  [id number]      Account #:      [id number]  
Admission:  11/18/18     Attend Phys:    Doyle Bird MD 
Discharge:  11/22/18     Date of Birth:  [birth year]  
         Report #: [id number]
                                                                     [id number]  
_______________________________________________________________________________
THIS REPORT FOR:  //name//                      
 
CC: Doyle Landin
 
DATE OF SERVICE:  11/19/2018
 
 
CONSULTING PHYSICIAN:  Dr. Bird.
 
REASON FOR NEPHROLOGY CONSULTATION:  End-stage renal disease for maintenance
hemodialysis needs.
 
REASON FOR ADMISSION:  Left leg wound.
 
HISTORY OF PRESENT ILLNESS:  This is an 85-year-old male who has past medical
history of end-stage renal disease who is on hemodialysis every Tuesday,
Thursday and Saturday.  Lives in a long-term care facility after his right leg
amputation back in August with history of peripheral vascular disease, came in
because his left leg wound was not looking good.  He had his dialysis on
Saturday.  He does not make much urine.  He has a right IJ dialysis catheter,
which is a tunneled line.  He does not have any urinary complaints.  His wife is
at his bedside.
 
REVIEW OF SYSTEMS:  As mentioned above, otherwise negative.  He has no shortness
of breath.
 
ALLERGIES:  ACE INHIBITORS, LATEX ALLERGY AND PENICILLINS.
 
PAST MEDICAL HISTORY:  Includes history of diabetes type 2, end-stage renal
disease, on hemodialysis, started on dialysis end of July of this year, history
of atrial fibrillation, pacemaker, peripheral vascular disease, aortic valve
replacement, sepsis, chronic systolic congestive heart failure with ejection
fraction of 45%, coronary artery disease, has 3 coronary artery stents; fem-pop
bypass on the left side, hyperlipidemia, osteomyelitis of ankle and foot, left
toe amputation in 2015, right great toe and second toe amputation, right lower
extremity amputation.
 
PAST SURGICAL HISTORY:  As above.
 
FAMILY HISTORY:  Not significant.
 
SOCIAL HISTORY:  He is in a long-term facility, does not smoke or drink alcohol
or use illicit drugs.  He has a supportive wife.
 
HOME MEDICATIONS:  Include erythropoietin, fenofibrate, sevelamer, tamsulosin,
 
 
 
[place] [address]                    CONSULTATION                  
_______________________________________________________________________________
 
Name:       YAKOV RODRIGUEZ             Room:           M.310-P    DIS IN  
M.R.#:  [id number]      Account #:      [id number]  
Admission:  11/18/18     Attend Phys:    Doyle Bird MD 
Discharge:  11/22/18     Date of Birth:  [birth year]  
         Report #: [id number]
                                                                     [id number]  
_______________________________________________________________________________
ferrous sulfate, atorvastatin, B complex, nitroglycerin, ascorbic acid,
metoprolol, acetaminophen, losartan, aspirin, finasteride, insulin lispro,
mirtazapine, vitamin B12, warfarin, docosahexaenoic acid.
 
PHYSICAL EXAMINATION:
VITAL SIGNS:  Blood pressure is 103/59, respiratory rate 16, pulse rate 64,
temperature 36.6, pulse ox 94% on room air.
GENERAL:  He is awake and alert and oriented x 3.
HEAD, EYES, EARS, NOSE AND THROAT:  Mucous membranes are moist.
NECK:  There is no JVD.
CHEST:  Clear to auscultation anteriorly.  No wheezing.  Has a right IJ tunneled
dialysis catheter, which is intact.  No erythema or discharge around the site.
CARDIOVASCULAR:  S1, S2 normal.  No murmurs heard.
ABDOMEN:  Soft, nondistended, nontender, bowel sounds present.
EXTREMITIES:  There is no lower extremity edema.  Right above-knee amputation.
NEUROLOGICAL FUNCTION:  Gross neurological function intact.
PSYCHOLOGICAL:  Mood and affect seem to be normal.
 
LABORATORY DATA:  His hemoglobin is 10.2, potassium 3.2, sodium is 141 and CO2
is 31.  White count was 4.6.  Other labs are reviewed.
 
IMAGING:  Chest x-ray was reviewed.
 
ASSESSMENT:
1.  End-stage renal disease, on hemodialysis every Tuesday, Thursday, Saturday.
2.  Anemia of chronic kidney disease, hemoglobin 10.2, currently at goal.
3.  Hypertension.  Blood pressure is controlled.
4.  Diabetes type 2.
5.  Left leg wound, possible osteomyelitis, primary team is managing, the
patient is on antibiotics in the form of vancomycin.
 
PLAN:
1.  We will dialyze him tomorrow and then again on Wednesday because of
Thanksgiving schedule.
2.  Mild hypokalemia, potassium is 3.2.  We will replace with 10 mEq of
potassium just once.
3.  We will stop fenofibrate because of renal insufficiency.
 
Thank you for the consultation and I will discuss the plan with the patient, the
patient's family as well as the patient's nurse and will continue to follow
along with you.
 
 
 
<ELECTRONICALLY SIGNED>
                                        By:  Latonya Hernandez MD            
11/23/18     1012
D: 11/19/18 1013_______________________________________
T: 11/19/18 1042Adeepak Hernandez MD               /nt

## 2018-11-23 NOTE — NUR
CALL TO [facility] TO NOTIFY OF DC 11/22, SPOKE WITH JEFFRY.  FAXED DC INFO AND
FLOW SHEETS TO HER ALSO

## 2018-11-28 ENCOUNTER — HOSPITAL ENCOUNTER (OUTPATIENT)
Dept: HOSPITAL 96 - M.WC | Age: 83
End: 2018-11-28
Attending: SURGERY
Payer: MEDICARE

## 2018-11-28 DIAGNOSIS — L97.521: ICD-10-CM

## 2018-11-28 DIAGNOSIS — E11.51: ICD-10-CM

## 2018-11-28 DIAGNOSIS — Z95.0: ICD-10-CM

## 2018-11-28 DIAGNOSIS — E11.622: Primary | ICD-10-CM

## 2018-11-28 DIAGNOSIS — E11.621: ICD-10-CM

## 2018-11-28 DIAGNOSIS — Z95.828: ICD-10-CM

## 2018-11-28 DIAGNOSIS — L97.321: ICD-10-CM

## 2018-11-28 DIAGNOSIS — I70.243: ICD-10-CM

## 2018-11-28 DIAGNOSIS — I25.10: ICD-10-CM

## 2018-11-28 DIAGNOSIS — I50.9: ICD-10-CM

## 2018-11-28 DIAGNOSIS — L97.821: ICD-10-CM

## 2018-11-28 DIAGNOSIS — Z89.422: ICD-10-CM

## 2018-11-28 DIAGNOSIS — Z95.5: ICD-10-CM

## 2018-11-28 DIAGNOSIS — Z79.4: ICD-10-CM

## 2018-11-28 DIAGNOSIS — Z95.4: ICD-10-CM

## 2018-11-28 DIAGNOSIS — Z79.82: ICD-10-CM

## 2018-11-28 DIAGNOSIS — Z89.611: ICD-10-CM

## 2018-11-28 DIAGNOSIS — I11.0: ICD-10-CM

## 2018-11-28 DIAGNOSIS — E78.5: ICD-10-CM

## 2018-12-26 ENCOUNTER — HOSPITAL ENCOUNTER (OUTPATIENT)
Dept: HOSPITAL 96 - M.WC | Age: 83
End: 2018-12-26
Attending: SURGERY
Payer: MEDICARE

## 2018-12-26 DIAGNOSIS — Z89.421: ICD-10-CM

## 2018-12-26 DIAGNOSIS — L98.491: ICD-10-CM

## 2018-12-26 DIAGNOSIS — I50.9: ICD-10-CM

## 2018-12-26 DIAGNOSIS — Z89.411: ICD-10-CM

## 2018-12-26 DIAGNOSIS — I25.10: ICD-10-CM

## 2018-12-26 DIAGNOSIS — Z95.4: ICD-10-CM

## 2018-12-26 DIAGNOSIS — E11.621: ICD-10-CM

## 2018-12-26 DIAGNOSIS — L97.321: ICD-10-CM

## 2018-12-26 DIAGNOSIS — L89.152: ICD-10-CM

## 2018-12-26 DIAGNOSIS — I70.243: ICD-10-CM

## 2018-12-26 DIAGNOSIS — I70.244: ICD-10-CM

## 2018-12-26 DIAGNOSIS — I11.0: ICD-10-CM

## 2018-12-26 DIAGNOSIS — L97.521: ICD-10-CM

## 2018-12-26 DIAGNOSIS — E11.51: ICD-10-CM

## 2018-12-26 DIAGNOSIS — E78.5: ICD-10-CM

## 2018-12-26 DIAGNOSIS — Z95.0: ICD-10-CM

## 2018-12-26 DIAGNOSIS — Z95.5: ICD-10-CM

## 2018-12-26 DIAGNOSIS — L97.421: ICD-10-CM

## 2018-12-26 DIAGNOSIS — Z89.422: ICD-10-CM

## 2018-12-26 DIAGNOSIS — E11.622: Primary | ICD-10-CM

## 2018-12-26 DIAGNOSIS — Z95.828: ICD-10-CM

## 2019-01-09 ENCOUNTER — HOSPITAL ENCOUNTER (OUTPATIENT)
Dept: HOSPITAL 96 - M.WC | Age: 84
End: 2019-01-09
Attending: SURGERY
Payer: MEDICARE

## 2019-01-09 DIAGNOSIS — I70.244: ICD-10-CM

## 2019-01-09 DIAGNOSIS — L97.521: ICD-10-CM

## 2019-01-09 DIAGNOSIS — I11.0: ICD-10-CM

## 2019-01-09 DIAGNOSIS — I50.9: ICD-10-CM

## 2019-01-09 DIAGNOSIS — E78.5: ICD-10-CM

## 2019-01-09 DIAGNOSIS — I25.10: ICD-10-CM

## 2019-01-09 DIAGNOSIS — E11.621: Primary | ICD-10-CM

## 2019-01-09 DIAGNOSIS — L97.421: ICD-10-CM

## 2019-01-09 DIAGNOSIS — L97.821: ICD-10-CM

## 2019-01-09 DIAGNOSIS — E11.622: ICD-10-CM

## 2019-02-06 ENCOUNTER — HOSPITAL ENCOUNTER (OUTPATIENT)
Dept: HOSPITAL 96 - M.WC | Age: 84
End: 2019-02-06
Attending: SURGERY
Payer: MEDICARE

## 2019-02-06 DIAGNOSIS — E11.621: Primary | ICD-10-CM

## 2019-02-06 DIAGNOSIS — Z95.5: ICD-10-CM

## 2019-02-06 DIAGNOSIS — I25.10: ICD-10-CM

## 2019-02-06 DIAGNOSIS — I50.9: ICD-10-CM

## 2019-02-06 DIAGNOSIS — Z89.611: ICD-10-CM

## 2019-02-06 DIAGNOSIS — E78.5: ICD-10-CM

## 2019-02-06 DIAGNOSIS — I11.0: ICD-10-CM

## 2019-02-06 DIAGNOSIS — L97.421: ICD-10-CM

## 2019-02-06 DIAGNOSIS — E11.51: ICD-10-CM

## 2019-02-06 DIAGNOSIS — Z95.4: ICD-10-CM

## 2019-02-06 DIAGNOSIS — I70.244: ICD-10-CM

## 2019-02-06 DIAGNOSIS — Z95.828: ICD-10-CM

## 2019-02-06 DIAGNOSIS — Z89.422: ICD-10-CM

## 2019-02-06 DIAGNOSIS — Z95.0: ICD-10-CM

## 2019-02-06 DIAGNOSIS — L97.521: ICD-10-CM

## 2019-03-06 ENCOUNTER — HOSPITAL ENCOUNTER (OUTPATIENT)
Dept: HOSPITAL 96 - M.WC | Age: 84
End: 2019-03-06
Attending: SURGERY
Payer: MEDICARE

## 2019-03-06 DIAGNOSIS — L97.421: ICD-10-CM

## 2019-03-06 DIAGNOSIS — L97.521: ICD-10-CM

## 2019-03-06 DIAGNOSIS — E11.51: ICD-10-CM

## 2019-03-06 DIAGNOSIS — Z89.422: ICD-10-CM

## 2019-03-06 DIAGNOSIS — L97.321: ICD-10-CM

## 2019-03-06 DIAGNOSIS — Z95.0: ICD-10-CM

## 2019-03-06 DIAGNOSIS — Z95.828: ICD-10-CM

## 2019-03-06 DIAGNOSIS — I50.9: ICD-10-CM

## 2019-03-06 DIAGNOSIS — I70.244: ICD-10-CM

## 2019-03-06 DIAGNOSIS — Z95.5: ICD-10-CM

## 2019-03-06 DIAGNOSIS — I70.243: ICD-10-CM

## 2019-03-06 DIAGNOSIS — E11.621: Primary | ICD-10-CM

## 2019-03-06 DIAGNOSIS — I11.0: ICD-10-CM

## 2019-03-06 DIAGNOSIS — Z95.4: ICD-10-CM

## 2019-03-06 DIAGNOSIS — Z89.611: ICD-10-CM

## 2019-03-06 DIAGNOSIS — E78.5: ICD-10-CM

## 2019-03-06 DIAGNOSIS — I25.10: ICD-10-CM

## 2019-04-03 ENCOUNTER — HOSPITAL ENCOUNTER (OUTPATIENT)
Dept: HOSPITAL 96 - M.WC | Age: 84
End: 2019-04-03
Attending: SURGERY
Payer: MEDICARE

## 2019-04-03 DIAGNOSIS — L97.421: ICD-10-CM

## 2019-04-03 DIAGNOSIS — Z95.5: ICD-10-CM

## 2019-04-03 DIAGNOSIS — L97.321: ICD-10-CM

## 2019-04-03 DIAGNOSIS — Z89.422: ICD-10-CM

## 2019-04-03 DIAGNOSIS — E11.51: ICD-10-CM

## 2019-04-03 DIAGNOSIS — I70.244: ICD-10-CM

## 2019-04-03 DIAGNOSIS — Z89.611: ICD-10-CM

## 2019-04-03 DIAGNOSIS — Z95.0: ICD-10-CM

## 2019-04-03 DIAGNOSIS — E11.622: ICD-10-CM

## 2019-04-03 DIAGNOSIS — E11.621: Primary | ICD-10-CM

## 2019-04-03 DIAGNOSIS — I50.9: ICD-10-CM

## 2019-04-03 DIAGNOSIS — I11.0: ICD-10-CM

## 2019-04-03 DIAGNOSIS — I25.10: ICD-10-CM

## 2019-04-03 DIAGNOSIS — I70.243: ICD-10-CM

## 2019-04-03 DIAGNOSIS — Z95.4: ICD-10-CM

## 2019-04-03 DIAGNOSIS — E78.5: ICD-10-CM

## 2019-04-03 DIAGNOSIS — Z95.828: ICD-10-CM

## 2019-04-03 DIAGNOSIS — L97.521: ICD-10-CM

## 2020-07-01 NOTE — NUR
ASSUMED CARE OF PATIENT THIS AM. PATIENT'S ASSESSMENT COMPLETED PRIOR TO GOING
TO DIALYSIS. PATIENT DENIES PAIN TO LEFT LEG. PATIENT TAKEN TO DIALYSIS BY BED
WITH DIALYSIS RN. WILL CONTINUE WITH PLAN OF CARE. Pt has appointment this week.  Please see if they are comfortable for in office visit, if not then video visit